# Patient Record
Sex: FEMALE | Race: WHITE | NOT HISPANIC OR LATINO | Employment: FULL TIME | URBAN - METROPOLITAN AREA
[De-identification: names, ages, dates, MRNs, and addresses within clinical notes are randomized per-mention and may not be internally consistent; named-entity substitution may affect disease eponyms.]

---

## 2017-09-11 ENCOUNTER — PRENATAL OFFICE VISIT (OUTPATIENT)
Dept: NURSING | Facility: CLINIC | Age: 22
End: 2017-09-11
Payer: MEDICAID

## 2017-09-11 VITALS
HEART RATE: 72 BPM | WEIGHT: 229 LBS | HEIGHT: 67 IN | DIASTOLIC BLOOD PRESSURE: 52 MMHG | BODY MASS INDEX: 35.94 KG/M2 | SYSTOLIC BLOOD PRESSURE: 110 MMHG

## 2017-09-11 DIAGNOSIS — Z34.81 ENCOUNTER FOR SUPERVISION OF OTHER NORMAL PREGNANCY IN FIRST TRIMESTER: Primary | ICD-10-CM

## 2017-09-11 PROBLEM — Z34.90 SUPERVISION OF NORMAL PREGNANCY: Status: ACTIVE | Noted: 2017-09-11

## 2017-09-11 LAB
ERYTHROCYTE [DISTWIDTH] IN BLOOD BY AUTOMATED COUNT: 14.6 % (ref 10–15)
HCT VFR BLD AUTO: 38.2 % (ref 35–47)
HGB BLD-MCNC: 12.6 G/DL (ref 11.7–15.7)
MCH RBC QN AUTO: 26.4 PG (ref 26.5–33)
MCHC RBC AUTO-ENTMCNC: 33 G/DL (ref 31.5–36.5)
MCV RBC AUTO: 80 FL (ref 78–100)
PLATELET # BLD AUTO: 307 10E9/L (ref 150–450)
RBC # BLD AUTO: 4.78 10E12/L (ref 3.8–5.2)
WBC # BLD AUTO: 9.9 10E9/L (ref 4–11)

## 2017-09-11 PROCEDURE — 86850 RBC ANTIBODY SCREEN: CPT | Performed by: OBSTETRICS & GYNECOLOGY

## 2017-09-11 PROCEDURE — 87086 URINE CULTURE/COLONY COUNT: CPT | Performed by: OBSTETRICS & GYNECOLOGY

## 2017-09-11 PROCEDURE — 99207 ZZC NO CHARGE NURSE ONLY: CPT

## 2017-09-11 PROCEDURE — 86762 RUBELLA ANTIBODY: CPT | Performed by: OBSTETRICS & GYNECOLOGY

## 2017-09-11 PROCEDURE — 36415 COLL VENOUS BLD VENIPUNCTURE: CPT | Performed by: OBSTETRICS & GYNECOLOGY

## 2017-09-11 PROCEDURE — 87340 HEPATITIS B SURFACE AG IA: CPT | Performed by: OBSTETRICS & GYNECOLOGY

## 2017-09-11 PROCEDURE — 86901 BLOOD TYPING SEROLOGIC RH(D): CPT | Performed by: OBSTETRICS & GYNECOLOGY

## 2017-09-11 PROCEDURE — 86780 TREPONEMA PALLIDUM: CPT | Performed by: OBSTETRICS & GYNECOLOGY

## 2017-09-11 PROCEDURE — 85027 COMPLETE CBC AUTOMATED: CPT | Performed by: OBSTETRICS & GYNECOLOGY

## 2017-09-11 PROCEDURE — 87389 HIV-1 AG W/HIV-1&-2 AB AG IA: CPT | Performed by: OBSTETRICS & GYNECOLOGY

## 2017-09-11 PROCEDURE — 86900 BLOOD TYPING SEROLOGIC ABO: CPT | Performed by: OBSTETRICS & GYNECOLOGY

## 2017-09-11 RX ORDER — PRENATAL VIT/IRON FUM/FOLIC AC 27MG-0.8MG
1 TABLET ORAL DAILY
Qty: 100 TABLET | Refills: 3
Start: 2017-09-11 | End: 2023-11-09

## 2017-09-11 NOTE — PROGRESS NOTES
"Chief Complaint   Patient presents with     Prenatal Care     New Prenatal Nurse Visit   11w2d    Initial /52 (BP Location: Right arm, Cuff Size: Adult Regular)  Pulse 72  Ht 5' 6.5\" (1.689 m)  Wt 229 lb (103.9 kg)  LMP 06/24/2017  BMI 36.41 kg/m2 Estimated body mass index is 36.41 kg/(m^2) as calculated from the following:    Height as of this encounter: 5' 6.5\" (1.689 m).    Weight as of this encounter: 229 lb (103.9 kg).  Medication Reconciliation: complete     Patient is accompanied by ,son and . Prenatal book and folder (containing standard educational hand-outs and brochures) given to patient. Information in folder reviewed. Questions answered.     Discussed and gave written information on options available for 1st and 2nd trimester screening, CVS, amniocentesis, AFP, and QUAD screen plus optimal time-frame for testing. Brochure given on optional screening available to determine Cystic Fibrosis carrier status. Pt instructed to check with insurance regarding coverage of these optional tests. Pt advised to call back if she desires testing or has any questions or concerns.  Declines First Trimester Screen.  First Trimester ultrasound scheduled.    Prenatal labs obtained. New prenatal visit scheduled on 9/15/17 with Dr. Monahan.  Megan Viera RN          "

## 2017-09-11 NOTE — MR AVS SNAPSHOT
After Visit Summary   9/11/2017    Marie Matos    MRN: [id number]           Patient Information     Date Of Birth          1995        Visit Information        Provider Department      9/11/2017 3:00 PM [facility]; AURY OB NURSE [facility]3]        Today's Diagnoses     Encounter for supervision of other normal pregnancy in first trimester    -  1       Follow-ups after your visit        Your next 10 appointments already scheduled     Sep 14, 2017  3:30 PM CDT   [facility] with OXUS[facility]   [facility]    [address] [id number] [phone]            Sep 15, 2017  2:30 PM CDT   New Prenatal with Celine Monahan MD   [facility]3] ([facility]3])    [address]  [address]  [facility] [place] [facility] [place] [id number] [phone]              Future tests that were ordered for you today     Open Future Orders        Priority Expected Expires Ordered    US OB < 14 weeks, single,  for dating ([id number]) Routine  12/10/2017 9/11/2017            Who to contact     If you have questions or need follow up information about today's clinic visit or your schedule please contact [facility] directly at [phone].  Normal or non-critical lab and imaging results will be communicated to you by MyChart, letter or phone within 4 business days after the clinic has received the results. If you do not hear from us within 7 days, please contact the clinic through MyChart or phone. If you have a critical or abnormal lab result, we will notify you by phone as soon as possible.  Submit refill requests through [employer] or call your pharmacy and they will forward the refill request to us. Please allow 3 business days for your refill to be completed.          Additional Information About Your Visit        [employer] Information     [employer] gives you secure access to your  "electronic health record. If you see a primary care provider, you can also send messages to your care team and make appointments. If you have questions, please call your primary care clinic.  If you do not have a primary care provider, please call [phone] and they will assist you.        Care EveryWhere ID     This is your Care EveryWhere ID. This could be used by other organizations to access your [facility] medical records  [id number]        Your Vitals Were     Pulse Height Last Period BMI (Body Mass Index)          72 5' 6.5\" (1.689 m) 06/24/2017 36.41 kg/m2         Blood Pressure from Last 3 Encounters:   09/11/17 110/52   11/16/15 106/64   11/02/15 108/70    Weight from Last 3 Encounters:   09/11/17 229 lb (103.9 kg)   11/16/15 208 lb (94.3 kg)   11/02/15 203 lb 14.4 oz (92.5 kg)              We Performed the Following     ABO/RH Type and Screen     Anti Treponema     CBC with Platelets     Hepatitis B surface antigen     HIV Antigen Antibody Combo     Rubella Antibody IgG Quantitative     Urine Culture Aerobic Bacterial          Today's Medication Changes          These changes are accurate as of: 9/11/17  4:25 PM.  If you have any questions, ask your nurse or doctor.               Start taking these medicines.        Dose/Directions    prenatal multivitamin plus iron 27-0.8 MG Tabs per tablet   Used for:  Encounter for supervision of other normal pregnancy in first trimester        Dose:  1 tablet   Take 1 tablet by mouth daily   Quantity:  100 tablet   Refills:  3            Where to get your medicines      Some of these will need a paper prescription and others can be bought over the counter.  Ask your nurse if you have questions.     You don't need a prescription for these medications     prenatal multivitamin plus iron 27-0.8 MG Tabs per tablet                Primary Care Provider    Physician No Ref-Primary       No address on file        Equal Access to Services     МАРИНА CANO AH: Valeria zaman " damir Ambrose, waromada lukayasad, sanjuanata karenata cason, bryce turnerchichi kat. So [facility] [phone].    ATENCIÓN: Si habla español, tiene a montes disposición servicios gratuitos de asistencia lingüística. Sharon al [phone].    We comply with applicable federal civil rights laws and [place] laws. We do not discriminate on the basis of race, color, national origin, age, disability sex, sexual orientation or gender identity.            Thank you!     Thank you for choosing [facility]  for your care. Our goal is always to provide you with excellent care. Hearing back from our patients is one way we can continue to improve our services. Please take a few minutes to complete the written survey that you may receive in the mail after your visit with us. Thank you!             Your Updated Medication List - Protect others around you: Learn how to safely use, store and throw away your medicines at www.disposemymeds.org.          This list is accurate as of: 9/11/17  4:25 PM.  Always use your most recent med list.                   Brand Name Dispense Instructions for use Diagnosis    prenatal multivitamin plus iron 27-0.8 MG Tabs per tablet     100 tablet    Take 1 tablet by mouth daily    Encounter for supervision of other normal pregnancy in first trimester

## 2017-09-12 LAB
ABO + RH BLD: NORMAL
ABO + RH BLD: NORMAL
BACTERIA SPEC CULT: NORMAL
BLD GP AB SCN SERPL QL: NORMAL
BLOOD BANK CMNT PATIENT-IMP: NORMAL
SPECIMEN EXP DATE BLD: NORMAL
SPECIMEN SOURCE: NORMAL

## 2017-09-13 LAB
HBV SURFACE AG SERPL QL IA: NONREACTIVE
HIV 1+2 AB+HIV1 P24 AG SERPL QL IA: NONREACTIVE
RUBV IGG SERPL IA-ACNC: 32 IU/ML
T PALLIDUM IGG+IGM SER QL: NEGATIVE

## 2017-09-15 ENCOUNTER — PRENATAL OFFICE VISIT (OUTPATIENT)
Dept: OBGYN | Facility: CLINIC | Age: 22
End: 2017-09-15
Payer: MEDICAID

## 2017-09-15 VITALS
HEART RATE: 96 BPM | WEIGHT: 229.9 LBS | DIASTOLIC BLOOD PRESSURE: 60 MMHG | BODY MASS INDEX: 36.55 KG/M2 | SYSTOLIC BLOOD PRESSURE: 114 MMHG

## 2017-09-15 DIAGNOSIS — Z34.81 ENCOUNTER FOR SUPERVISION OF OTHER NORMAL PREGNANCY IN FIRST TRIMESTER: Primary | ICD-10-CM

## 2017-09-15 DIAGNOSIS — Z23 NEED FOR PROPHYLACTIC VACCINATION AND INOCULATION AGAINST INFLUENZA: ICD-10-CM

## 2017-09-15 LAB
SPECIMEN SOURCE: NORMAL
WET PREP SPEC: NORMAL

## 2017-09-15 PROCEDURE — 99207 ZZC FIRST OB VISIT: CPT | Performed by: OBSTETRICS & GYNECOLOGY

## 2017-09-15 PROCEDURE — G0145 SCR C/V CYTO,THINLAYER,RESCR: HCPCS | Performed by: OBSTETRICS & GYNECOLOGY

## 2017-09-15 PROCEDURE — 87210 SMEAR WET MOUNT SALINE/INK: CPT | Performed by: OBSTETRICS & GYNECOLOGY

## 2017-09-15 PROCEDURE — 90686 IIV4 VACC NO PRSV 0.5 ML IM: CPT | Performed by: OBSTETRICS & GYNECOLOGY

## 2017-09-15 PROCEDURE — 87591 N.GONORRHOEAE DNA AMP PROB: CPT | Performed by: OBSTETRICS & GYNECOLOGY

## 2017-09-15 PROCEDURE — 90471 IMMUNIZATION ADMIN: CPT | Performed by: OBSTETRICS & GYNECOLOGY

## 2017-09-15 PROCEDURE — 87491 CHLMYD TRACH DNA AMP PROBE: CPT | Performed by: OBSTETRICS & GYNECOLOGY

## 2017-09-15 NOTE — PROGRESS NOTES
SUBJECTIVE: Marie Vidal DEVAN Matos is an 22 year old female   Obstetric History       T1      L1     SAB0   TAB0   Ectopic0   Multiple0   Live Births1     here for initial OB visit.    This patient is accompanied in the office by her  and her .  Patient reports nausea, vomiting, dizziness, fatigue, and breast tenderness.  She states that she is able to eat a full meal but is not able to keep the majority of her food down.  Patient states she is staying well hydrated.  Patient also reports discharge and vaginal itching.  Denies vaginal bleeding, dysuria.    Past Medical History of Father of Baby: No significant medical history    Employment:  Making soaps/bath bombs for a company    Dating: consistent with dates on first trimester scan     Past Medical History:   Diagnosis Date     Abnormal Pap smear      Miscarriage           Past Surgical History:   Procedure Laterality Date     DILATION AND CURETTAGE  2012    In [place] - Missed AB          Family History   Problem Relation Age of Onset     Family History Negative Mother      Family History Negative Father      DIABETES Paternal Grandfather 50     CANCER Paternal Grandmother          Social History     Social History     Marital status:      Spouse name: N/A     Number of children: 1     Years of education: N/A     Occupational History     assembly work      Social History Main Topics     Smoking status: Never Smoker     Smokeless tobacco: Never Used     Alcohol use No     Drug use: No     Sexual activity: Yes     Partners: Male     Other Topics Concern     Not on file     Social History Narrative       Review of Systems:   CONSTITUTIONAL: NEGATIVE for fever, chills, change in weight, +fatigue  INTEGUMENTARY/SKIN: NEGATIVE for worrisome rashes, moles or lesions  RESP: NEGATIVE for significant cough or SOB  BREAST: NEGATIVE for masses, tenderness or discharge, +breast tenderness   CV: NEGATIVE for chest pain, palpitations  or peripheral edema  GI: NEGATIVE for abdominal pain, heartburn, or change in bowel habits: +nausea, vomiting  : negative for, dysuria, vaginal discharge and bleeding  MUSCULOSKELETAL: NEGATIVE for significant arthralgias or myalgia  NEURO: NEGATIVE for weakness, dizziness or paresthesias, +dizziness   PSYCHIATRIC: NEGATIVE for changes in mood or affect      This document serves as a record of the services and decisions personally performed and made by Celine Monahan MD. It was created on her behalf by Alicia Hayden, a trained medical scribe. The creation of this document is based the provider's statements to the medical scribe.  Alicia Hayden September 15, 2017 3:04 PM         EXAM: /60 (BP Location: Right arm, Patient Position: Sitting, Cuff Size: Adult Large)  Pulse 96  Wt 104.3 kg (229 lb 14.4 oz)  LMP 06/24/2017  BMI 36.55 kg/m2   FHR obtained with TAUS  GENERAL APPEARANCE: healthy, alert and no distress  NECK: no adenopathy, no asymmetry, masses, or scars and thyroid normal to palpation  RESP: lungs clear to auscultation - no rales, rhonchi or wheezes  BREAST: normal without masses, tenderness or nipple discharge and no palpable axillary masses or adenopathy  CV: regular rates and rhythm, normal S1 S2, no S3 or S4 and no murmur, click or rub -  ABDOMEN:  soft, nontender, no HSM or masses and bowel sounds normal    Pelvix exam:  Perineum: Intact;   Vulva: Normal genitalia;  Vagina: Normal mucosa, no discharge,   Cervix: Parous, closed, mobile, no discharge;  Uterus: 11 weeks,  Retroflexed, normal shape and consistency;   Adnexa: No masses, nodularity, tenderness;  Rectum: Normal without lesion or mass;   Bony Pelvis: Adequate.       ASSESSMENT/ PLAN:  Follow up in 4 weeks.  Normal exercise.  Normal sexual activity.  Prenatal vitamins.  Anticipated weight gain: 11-20 lbs (0.5lbs/wk)  Patient counselled on prenatal testing options to include 1st trimester screening, quad screen, cystic fibrosis  screening, and age related testing as appropriate for aneuploiding including possibly chorionic villus sampling, amniocentesis, level II ultrasound.   Patient will notify us with desired testing or follow-up at next ob visit.   Early gestational diabetes screen.       The information in this document, created by the medical scribe for me, accurately reflects the services I personally performed and the decisions made by me. I have reviewed and approved this document for accuracy prior to leaving the patient care area.  9/15/2017 3:04 PM     Celine Monahan M.D.

## 2017-09-15 NOTE — NURSING NOTE
"Chief Complaint   Patient presents with     Prenatal Care     NPN MD visit     Flu Shot   11w6d  Here today with spouse / child and the   Pt c/o of vaginal discharge/itching on and off since birth of previous child : advised in past that this is normal    Initial /60 (BP Location: Right arm, Patient Position: Sitting, Cuff Size: Adult Large)  Pulse 96  Wt 229 lb 14.4 oz (104.3 kg)  LMP 06/24/2017  BMI 36.55 kg/m2 Estimated body mass index is 36.55 kg/(m^2) as calculated from the following:    Height as of 9/11/17: 5' 6.5\" (1.689 m).    Weight as of this encounter: 229 lb 14.4 oz (104.3 kg).  Medication Reconciliation: complete    "

## 2017-09-15 NOTE — PATIENT INSTRUCTIONS
Call the clinic ([place] [phone], [place] [phone]) right away with any of the following concerns:    1.   Severe abdominal pain or cramping, that does not go away with rest and hydration    2.   Vaginal bleeding.      Continue your prenatal vitamins daily.    Please call with any additional concerns that your have, and continue your prenatal visits every four weeks!

## 2017-09-15 NOTE — PROGRESS NOTES
Injectable Influenza Immunization Documentation    1.  Is the person to be vaccinated sick today? No    2. Does the person to be vaccinated have an allergy to a component   of the vaccine? No    3. Has the person to be vaccinated ever had a serious reaction   to influenza vaccine in the past? No    4. Has the person to be vaccinated ever had Guillain-Barré syndrome? No    Form completed by Radha Ribeiro CMA

## 2017-09-17 LAB
C TRACH DNA SPEC QL NAA+PROBE: NEGATIVE
N GONORRHOEA DNA SPEC QL NAA+PROBE: NEGATIVE
SPECIMEN SOURCE: NORMAL
SPECIMEN SOURCE: NORMAL

## 2017-09-19 LAB
COPATH REPORT: NORMAL
PAP: NORMAL

## 2017-10-13 ENCOUNTER — TELEPHONE (OUTPATIENT)
Dept: OBGYN | Facility: CLINIC | Age: 22
End: 2017-10-13

## 2017-10-13 ENCOUNTER — PRENATAL OFFICE VISIT (OUTPATIENT)
Dept: OBGYN | Facility: CLINIC | Age: 22
End: 2017-10-13
Payer: MEDICAID

## 2017-10-13 VITALS
DIASTOLIC BLOOD PRESSURE: 60 MMHG | SYSTOLIC BLOOD PRESSURE: 116 MMHG | BODY MASS INDEX: 36.25 KG/M2 | HEART RATE: 104 BPM | WEIGHT: 228 LBS

## 2017-10-13 DIAGNOSIS — Z34.82 ENCOUNTER FOR SUPERVISION OF OTHER NORMAL PREGNANCY IN SECOND TRIMESTER: Primary | ICD-10-CM

## 2017-10-13 PROCEDURE — 99207 ZZC PRENATAL VISIT: CPT | Performed by: OBSTETRICS & GYNECOLOGY

## 2017-10-13 PROCEDURE — T1013 SIGN LANG/ORAL INTERPRETER: HCPCS | Mod: U3 | Performed by: OBSTETRICS & GYNECOLOGY

## 2017-10-13 NOTE — NURSING NOTE
"Chief Complaint   Patient presents with     Prenatal Care     nausea and vomiting - alt to prenatal vitamin?     15w6d    Initial /60 (BP Location: Right arm, Patient Position: Chair, Cuff Size: Adult Large)  Pulse 104  Wt 228 lb (103.4 kg)  LMP 06/24/2017  BMI 36.25 kg/m2 Estimated body mass index is 36.25 kg/(m^2) as calculated from the following:    Height as of 9/11/17: 5' 6.5\" (1.689 m).    Weight as of this encounter: 228 lb (103.4 kg).  Medication Reconciliation: complete     Nurse assisted visit.  Elsa Milton MA.      "

## 2017-10-13 NOTE — PATIENT INSTRUCTIONS
Call the clinic ([facility] [phone], [place] [phone]) right away with any of the following concerns:    1.   Severe abdominal pain or cramping, that does not go away with rest and hydration    2.   Vaginal bleeding.      Continue your prenatal vitamins daily.    Please call with any additional concerns that your have, and continue your prenatal visits every four weeks!      For nausea and vomiting in pregnancy:     You may use pyridoxine (vitamin B6), 25 mg by mouth every 6-8 hours as needed with 1/2 of unisom 25 mg tablet taken once or twice daily.     These items may be purchased over the counter.

## 2017-10-13 NOTE — MR AVS SNAPSHOT
After Visit Summary   10/13/2017    Marie Matos    MRN: [id number]           Patient Information     Date Of Birth          1995        Visit Information        Provider Department      10/13/2017 4:00 PM Celine Monahan MD; [facility] [facility]        Today's Diagnoses     Encounter for supervision of other normal pregnancy in second trimester    -  1      Care Instructions    Call the clinic ([place] [phone], [place] [phone]) right away with any of the following concerns:    1.   Severe abdominal pain or cramping, that does not go away with rest and hydration    2.   Vaginal bleeding.      Continue your prenatal vitamins daily.    Please call with any additional concerns that your have, and continue your prenatal visits every four weeks!      For nausea and vomiting in pregnancy:     You may use pyridoxine (vitamin B6), 25 mg by mouth every 6-8 hours as needed with 1/2 of unisom 25 mg tablet taken once or twice daily.     These items may be purchased over the counter.                 Follow-ups after your visit        Your next 10 appointments already scheduled     Nov 17, 2017  4:00 PM CST   ESTABLISHED PRENATAL with Celine Monahan MD   [facility] ([facility]    [address]  [address]  [place] [address] [phone]              Future tests that were ordered for you today     Open Future Orders        Priority Expected Expires Ordered    Maternal quad screen Routine  10/13/2018 10/13/2017    US OB > 14 Weeks Complete Single Routine  10/13/2018 10/13/2017            Who to contact     If you have questions or need follow up information about today's clinic visit or your schedule please contact [facility] directly at [phone].  Normal or non-critical lab and imaging results will be communicated to you by MyChart, letter or phone within 4 business days after the clinic  has received the results. If you do not hear from us within 7 days, please contact the clinic through [employer] or phone. If you have a critical or abnormal lab result, we will notify you by phone as soon as possible.  Submit refill requests through [employer] or call your pharmacy and they will forward the refill request to us. Please allow 3 business days for your refill to be completed.          Additional Information About Your Visit        [employer] Information     [employer] gives you secure access to your electronic health record. If you see a primary care provider, you can also send messages to your care team and make appointments. If you have questions, please call your primary care clinic.  If you do not have a primary care provider, please call [phone] and they will assist you.        Care EveryWhere ID     This is your Care EveryWhere ID. This could be used by other organizations to access your [place] medical records  [id number]        Your Vitals Were     Pulse Last Period BMI (Body Mass Index)             104 06/24/2017 36.25 kg/m2          Blood Pressure from Last 3 Encounters:   10/13/17 116/60   09/15/17 114/60   09/11/17 110/52    Weight from Last 3 Encounters:   10/13/17 228 lb (103.4 kg)   09/15/17 229 lb 14.4 oz (104.3 kg)   09/11/17 229 lb (103.9 kg)               Primary Care Provider    Physician No Ref-Primary       NO REF-PRIMARY PHYSICIAN        Equal Access to Services     МАРИНА CANO AH: Hadii rafi ku hadasho Soomaali, waaxda luqadaha, qaybta kaalmada bryce cason. So [facility] [phone].    ATENCIÓN: Si habla español, tiene a montes disposición servicios gratuitos de asistencia lingüística. Llame al [phone].    We comply with applicable federal civil rights laws and [place] laws. We do not discriminate on the basis of race, color, national origin, age, disability, sex, sexual orientation, or gender identity.            Thank you!     Thank you for  choosing [facility]  for your care. Our goal is always to provide you with excellent care. Hearing back from our patients is one way we can continue to improve our services. Please take a few minutes to complete the written survey that you may receive in the mail after your visit with us. Thank you!             Your Updated Medication List - Protect others around you: Learn how to safely use, store and throw away your medicines at www.disposemymeds.org.          This list is accurate as of: 10/13/17 11:59 PM.  Always use your most recent med list.                   Brand Name Dispense Instructions for use Diagnosis    prenatal multivitamin plus iron 27-0.8 MG Tabs per tablet     100 tablet    Take 1 tablet by mouth daily    Encounter for supervision of other normal pregnancy in first trimester

## 2017-10-13 NOTE — PROGRESS NOTES
CC: Marie Matos is a 22 year old who presents for routine prenatal visit @ 15w6d       HPI:  This patient is accompanied in the office by her  and .  Doing well, denies bleeding, cramping.  Patient states her nausea and vomiting have not improved.     This document serves as a record of the services and decisions personally performed and made by Celine Monahan MD. It was created on her behalf by Alicia Hayden, a trained medical scribe. The creation of this document is based the provider's statements to the medical scribe.  Alicia Hayden 2017 4:26 PM       Exam:   See OB flowsheet    ASSESSMENT/PLAN  Marie Matos is a 22 year old   @ 15w6d     1)  Return to clinic in 4 weeks  2)  Recommended Vitamin B6 and Unisom for nausea.   3)  Blood pressure and weight gain reviewed with patient   4)  Patient desires NIPT testing     The information in this document, created by the medical scribe for me, accurately reflects the services I personally performed and the decisions made by me. I have reviewed and approved this document for accuracy prior to leaving the patient care area.  10/13/2017 4:26 PM       Celine Monahan M.D.

## 2017-10-16 NOTE — TELEPHONE ENCOUNTER
Through [facility]  Services [phone] # [id number]  :  I LMOVM for pt to CB for nurse only genetic testing in the [facility] or [facility] office  Radha Ribeiro CMA

## 2017-11-17 ENCOUNTER — PRENATAL OFFICE VISIT (OUTPATIENT)
Dept: OBGYN | Facility: CLINIC | Age: 22
End: 2017-11-17
Payer: MEDICAID

## 2017-11-17 VITALS
HEIGHT: 67 IN | HEART RATE: 66 BPM | DIASTOLIC BLOOD PRESSURE: 58 MMHG | BODY MASS INDEX: 35.83 KG/M2 | WEIGHT: 228.3 LBS | SYSTOLIC BLOOD PRESSURE: 108 MMHG

## 2017-11-17 DIAGNOSIS — Z34.82 ENCOUNTER FOR SUPERVISION OF OTHER NORMAL PREGNANCY IN SECOND TRIMESTER: Primary | ICD-10-CM

## 2017-11-17 PROCEDURE — T1013 SIGN LANG/ORAL INTERPRETER: HCPCS | Mod: U3 | Performed by: OBSTETRICS & GYNECOLOGY

## 2017-11-17 PROCEDURE — 99207 ZZC PRENATAL VISIT: CPT | Performed by: OBSTETRICS & GYNECOLOGY

## 2017-11-17 NOTE — NURSING NOTE
"Chief Complaint   Patient presents with     Prenatal Care     20+6       Initial /58  Pulse 66  Ht 5' 6.5\" (1.689 m)  Wt 228 lb 4.8 oz (103.6 kg)  LMP 2017  BMI 36.3 kg/m2 Estimated body mass index is 36.3 kg/(m^2) as calculated from the following:    Height as of this encounter: 5' 6.5\" (1.689 m).    Weight as of this encounter: 228 lb 4.8 oz (103.6 kg).  BP completed using cuff size: regular        The following HM Due: NONE      The following patient reported/Care Every where data was sent to:  P ABSTRACT QUALITY INITIATIVES [33044]  NA     patient has appointment for today    Tran VARMA               "

## 2017-11-17 NOTE — PROGRESS NOTES
CC: Here for routine prenatal visit @ 20w6d       HPI:  Doing well, denies bleeding, cramping.      Exam:   See OB flowsheet    ASSESSMENT/PLAN  Marie Vidal DEVAN Matos is a 22 year old   @ 20w6d    1)  Return to clinic 4 wks  2) ultrasound scheduled.

## 2017-11-20 ENCOUNTER — TELEPHONE (OUTPATIENT)
Dept: OBGYN | Facility: CLINIC | Age: 22
End: 2017-11-20

## 2017-11-20 ENCOUNTER — ALLIED HEALTH/NURSE VISIT (OUTPATIENT)
Dept: NURSING | Facility: CLINIC | Age: 22
End: 2017-11-20
Payer: MEDICAID

## 2017-11-20 ENCOUNTER — TRANSFERRED RECORDS (OUTPATIENT)
Dept: HEALTH INFORMATION MANAGEMENT | Facility: CLINIC | Age: 22
End: 2017-11-20

## 2017-11-20 VITALS — HEIGHT: 67 IN | BODY MASS INDEX: 36.52 KG/M2 | WEIGHT: 232.7 LBS

## 2017-11-20 DIAGNOSIS — Z34.02 ENCOUNTER FOR SUPERVISION OF NORMAL FIRST PREGNANCY IN SECOND TRIMESTER: Primary | ICD-10-CM

## 2017-11-20 PROCEDURE — 40000791 ZZHCL STATISTIC VERIFI PRENATAL TRISOMY 21,18,13: Mod: 90 | Performed by: OBSTETRICS & GYNECOLOGY

## 2017-11-20 PROCEDURE — 99207 ZZC NO CHARGE NURSE ONLY: CPT

## 2017-11-20 PROCEDURE — 99000 SPECIMEN HANDLING OFFICE-LAB: CPT | Performed by: OBSTETRICS & GYNECOLOGY

## 2017-11-20 PROCEDURE — T1013 SIGN LANG/ORAL INTERPRETER: HCPCS | Mod: U3

## 2017-11-20 PROCEDURE — 36415 COLL VENOUS BLD VENIPUNCTURE: CPT | Performed by: OBSTETRICS & GYNECOLOGY

## 2017-11-20 NOTE — MR AVS SNAPSHOT
After Visit Summary   11/20/2017    Marie Matos    MRN: [id number]           Patient Information     Date Of Birth          1995        Visit Information        Provider Department      11/20/2017 3:15 PM [employer]; [place] OB NURSE [facility]        Today's Diagnoses     Encounter for supervision of normal first pregnancy in second trimester    -  1       Follow-ups after your visit        Your next 10 appointments already scheduled     Nov 28, 2017 10:30 AM CST    OB > 14 WEEKS COMPLETE SINGLE with OXUS1   [facility] ([facility])    [address] [id number] [phone]           Please bring a list of your medicines (including vitamins, minerals and over-the-counter drugs). Also, tell your doctor about any allergies you may have. Wear comfortable clothes and leave your valuables at home.  If you re less than 20 weeks drink four 8-ounce glasses of fluid an hour before your exam. If you need to empty your bladder before your exam, try to release only a little urine. Then, drink another glass of fluid.  You may have up to two family members in the exam room. If you bring a small child, an adult must be there to care for him or her.  Please call the Imaging Department at your exam site with any questions.            Dec 08, 2017  3:30 PM CST   ESTABLISHED PRENATAL with Celine Monahan MD   [facility] ([facility])    [address] [place] [address]  [address]  [facility] [address] [phone]            Jan 05, 2018  1:30 PM CST   ESTABLISHED PRENATAL with Celine Monahan MD   [facility] ([facility]    [address] [place] [address]  [address]  [facility] [address]   [phone]              Who to contact     If you have questions or need follow up information about today's clinic visit or your schedule please contact  "[facility] directly at [phone].  Normal or non-critical lab and imaging results will be communicated to you by MyChart, letter or phone within 4 business days after the clinic has received the results. If you do not hear from us within 7 days, please contact the clinic through Digitwhizhart or phone. If you have a critical or abnormal lab result, we will notify you by phone as soon as possible.  Submit refill requests through Previstar or call your pharmacy and they will forward the refill request to us. Please allow 3 business days for your refill to be completed.          Additional Information About Your Visit        [employer] Information     [employer] gives you secure access to your electronic health record. If you see a primary care provider, you can also send messages to your care team and make appointments. If you have questions, please call your primary care clinic.  If you do not have a primary care provider, please call [phone] and they will assist you.        Care EveryWhere ID     This is your Care EveryWhere ID. This could be used by other organizations to access your [place] medical records  [id number]        Your Vitals Were     Height Last Period BMI (Body Mass Index)             5' 6.5\" (1.689 m) 06/24/2017 37 kg/m2          Blood Pressure from Last 3 Encounters:   11/17/17 108/58   10/13/17 116/60   09/15/17 114/60    Weight from Last 3 Encounters:   11/20/17 232 lb 11.2 oz (105.6 kg)   11/17/17 228 lb 4.8 oz (103.6 kg)   10/13/17 228 lb (103.4 kg)              We Performed the Following     Non Invasive Prenatal Test Cell Free DNA        Primary Care Provider    Physician No Ref-Primary       NO REF-PRIMARY PHYSICIAN        Equal Access to Services     МАРИНА CANO : Hadii rafi Ambrose, josef chávez, bryce oliva. So [facility] [phone].    ATENCIÓN: Si habla español, tiene a montes disposición servicios gratuitos de " asistencia lingüística. Sharon al [phone].    We comply with applicable federal civil rights laws and [place] laws. We do not discriminate on the basis of race, color, national origin, age, disability, sex, sexual orientation, or gender identity.            Thank you!     Thank you for choosing [facility]  for your care. Our goal is always to provide you with excellent care. Hearing back from our patients is one way we can continue to improve our services. Please take a few minutes to complete the written survey that you may receive in the mail after your visit with us. Thank you!             Your Updated Medication List - Protect others around you: Learn how to safely use, store and throw away your medicines at www.disposemymeds.org.          This list is accurate as of: 11/20/17  3:59 PM.  Always use your most recent med list.                   Brand Name Dispense Instructions for use Diagnosis    prenatal multivitamin plus iron 27-0.8 MG Tabs per tablet     100 tablet    Take 1 tablet by mouth daily    Encounter for supervision of other normal pregnancy in first trimester

## 2017-11-20 NOTE — NURSING NOTE
"Chief Complaint   Patient presents with     Allied Health Visit     Progenity Innatal testing       Initial Ht 5' 6.5\" (1.689 m)  Wt 232 lb 11.2 oz (105.6 kg)  LMP 06/24/2017  BMI 37 kg/m2 Estimated body mass index is 37 kg/(m^2) as calculated from the following:    Height as of this encounter: 5' 6.5\" (1.689 m).    Weight as of this encounter: 232 lb 11.2 oz (105.6 kg).  Medication Reconciliation: unable or not appropriate to perform    "

## 2017-11-28 ENCOUNTER — RADIANT APPOINTMENT (OUTPATIENT)
Dept: ULTRASOUND IMAGING | Facility: CLINIC | Age: 22
End: 2017-11-28
Attending: OBSTETRICS & GYNECOLOGY
Payer: MEDICAID

## 2017-11-28 DIAGNOSIS — Q27.0 TWO VESSEL CORD: Primary | ICD-10-CM

## 2017-11-28 DIAGNOSIS — Z34.82 ENCOUNTER FOR SUPERVISION OF OTHER NORMAL PREGNANCY IN SECOND TRIMESTER: ICD-10-CM

## 2017-11-28 PROCEDURE — T1013 SIGN LANG/ORAL INTERPRETER: HCPCS | Mod: U3

## 2017-11-28 PROCEDURE — 76805 OB US >/= 14 WKS SNGL FETUS: CPT | Performed by: OBSTETRICS & GYNECOLOGY

## 2017-11-29 ENCOUNTER — TELEPHONE (OUTPATIENT)
Dept: OBGYN | Facility: CLINIC | Age: 22
End: 2017-11-29

## 2017-11-29 NOTE — TELEPHONE ENCOUNTER
Results received from Progenity testing in [facility]..    Results were: sent to scanning and originals were placed on Dr. Monahan's Stanley Desk.    Message routed to provider to advise.

## 2017-12-12 ENCOUNTER — PRE VISIT (OUTPATIENT)
Dept: MATERNAL FETAL MEDICINE | Facility: CLINIC | Age: 22
End: 2017-12-12

## 2017-12-13 ENCOUNTER — OFFICE VISIT (OUTPATIENT)
Dept: MATERNAL FETAL MEDICINE | Facility: CLINIC | Age: 22
End: 2017-12-13
Attending: OBSTETRICS & GYNECOLOGY
Payer: MEDICAID

## 2017-12-13 ENCOUNTER — HOSPITAL ENCOUNTER (OUTPATIENT)
Dept: ULTRASOUND IMAGING | Facility: CLINIC | Age: 22
Discharge: HOME | End: 2017-12-13
Attending: OBSTETRICS & GYNECOLOGY | Admitting: OBSTETRICS & GYNECOLOGY
Payer: MEDICAID

## 2017-12-13 DIAGNOSIS — O09.899 SINGLE UMBILICAL ARTERY AFFECTING MANAGEMENT OF MOTHER, ANTEPARTUM, SINGLE GESTATION: Primary | ICD-10-CM

## 2017-12-13 DIAGNOSIS — O26.90 PREGNANCY RELATED CONDITION, UNSPECIFIED TRIMESTER: ICD-10-CM

## 2017-12-13 PROCEDURE — 76811 OB US DETAILED SNGL FETUS: CPT

## 2017-12-13 NOTE — MR AVS SNAPSHOT
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, antepartum, single gestation    -  1       Follow-ups after your visit        Your next 10 appointments already scheduled     Dec 29, 2017  2:00 PM CST   Ech Fetal Complete* with [id number]   [facility] Echo[facility]EKG ([facility])    [address]  [facility] [place] [address]               Jan 05, 2018  1:30 PM CST   ESTABLISHED PRENATAL with Celine Monahan MD   [facility] ([facility])    [address]  [address]  [facility] [address]   [phone]            Jan 16, 2018  1:30 PM CST   (Arrive by 1:15 PM)   [facility] US COMPRE SINGLE F/U with [id number]   [facility] Maternal Fetal Medicine Ultrasound - [facility] [place])    [address]  [facility] [address] [phone]           Wear comfortable clothes and leave your valuables at home.            Jan 16, 2018  2:00 PM CST   Radiology MD with  NONA WASHINGTON   [facility] Maternal Fetal Medicine - [facility] [place])    [address]  [facility] [address] [phone]           Please arrive at the time given for your first appointment. This visit is used internally to schedule the physician's time during your ultrasound.              Future tests that were ordered for you today     Open Future Orders        Priority Expected Expires Ordered    Echo Fetal Complete-Peds Cardiology Routine  12/13/2018 12/13/2017    MFM US Comprehensive Single F/U Routine  12/13/2018 12/13/2017            Who to contact     If you have questions or need follow up information about today's  clinic visit or your schedule please contact [facility]AL [facility]IC[facility] directly at [phone].  Normal or non-critical lab and imaging results will be communicated to you by MyChart, letter or phone within 4 business days after the clinic has received the results. If you do not hear from us within 7 days, please contact the clinic through Black Card Mediahart or phone. If you have a critical or abnormal lab result, we will notify you by phone as soon as possible.  Submit refill requests through [employer] or call your pharmacy and they will forward the refill request to us. Please allow 3 business days for your refill to be completed.          Additional Information About Your Visit        [employer] Information     [employer] gives you secure access to your electronic health record. If you see a primary care provider, you can also send messages to your care team and make appointments. If you have questions, please call your primary care clinic.  If you do not have a primary care provider, please call [phone] and they will assist you.        Care EveryWhere ID     This is your Care EveryWhere ID. This could be used by other organizations to access your [place] medical records  [id number]        Your Vitals Were     Last Period                   06/24/2017            Blood Pressure from Last 3 Encounters:   11/17/17 108/58   10/13/17 116/60   09/15/17 114/60    Weight from Last 3 Encounters:   11/20/17 105.6 kg (232 lb 11.2 oz)   11/17/17 103.6 kg (228 lb 4.8 oz)   10/13/17 103.4 kg (228 lb)               Primary Care Provider Fax #    Physician No Ref-Primary [phone]       No address on file        Equal Access to Services     [facility] RACHAEL : Hadii rafi reich Sochepe, waaxda luqadaha, qaybta kaalmada kamla, bryce stephens. So [facility] [phone].    ATENCIÓN: Si habla español, tiene a montes disposición servicios gratuitos de asistencia lingüística. Llame al [phone].    We  comply with applicable federal civil rights laws and [place] laws. We do not discriminate on the basis of race, color, national origin, age, disability, sex, sexual orientation, or gender identity.            Thank you!     Thank you for choosing [facility]  for your care. Our goal is always to provide you with excellent care. Hearing back from our patients is one way we can continue to improve our services. Please take a few minutes to complete the written survey that you may receive in the mail after your visit with us. Thank you!             Your Updated Medication List - Protect others around you: Learn how to safely use, store and throw away your medicines at www.disposemymeds.org.          This list is accurate as of: 12/13/17  2:02 PM.  Always use your most recent med list.                   Brand Name Dispense Instructions for use Diagnosis    prenatal multivitamin plus iron 27-0.8 MG Tabs per tablet     100 tablet    Take 1 tablet by mouth daily    Encounter for supervision of other normal pregnancy in first trimester

## 2017-12-13 NOTE — PROGRESS NOTES
"Please see \"Imaging\" tab under \"Chart Review\" for details of today's visit.    Afshan Murphy    "

## 2017-12-29 ENCOUNTER — HOSPITAL ENCOUNTER (OUTPATIENT)
Dept: CARDIOLOGY | Facility: CLINIC | Age: 22
Discharge: HOME | End: 2017-12-29
Attending: OBSTETRICS & GYNECOLOGY | Admitting: OBSTETRICS & GYNECOLOGY
Payer: MEDICAID

## 2017-12-29 DIAGNOSIS — O09.899 SINGLE UMBILICAL ARTERY AFFECTING MANAGEMENT OF MOTHER, ANTEPARTUM, SINGLE GESTATION: ICD-10-CM

## 2017-12-29 PROCEDURE — T1013 SIGN LANG/ORAL INTERPRETER: HCPCS | Mod: U3

## 2017-12-29 PROCEDURE — 76825 ECHO EXAM OF FETAL HEART: CPT

## 2018-01-05 ENCOUNTER — MYC MEDICAL ADVICE (OUTPATIENT)
Dept: OBGYN | Facility: CLINIC | Age: 23
End: 2018-01-05

## 2018-01-05 PROBLEM — Q27.0 TWO VESSEL CORD: Status: ACTIVE | Noted: 2018-01-05

## 2018-01-16 ENCOUNTER — HOSPITAL ENCOUNTER (OUTPATIENT)
Dept: ULTRASOUND IMAGING | Facility: CLINIC | Age: 23
Discharge: HOME | End: 2018-01-16
Attending: OBSTETRICS & GYNECOLOGY | Admitting: OBSTETRICS & GYNECOLOGY
Payer: MEDICAID

## 2018-01-16 ENCOUNTER — PRENATAL OFFICE VISIT (OUTPATIENT)
Dept: OBGYN | Facility: CLINIC | Age: 23
End: 2018-01-16
Payer: MEDICAID

## 2018-01-16 ENCOUNTER — OFFICE VISIT (OUTPATIENT)
Dept: MATERNAL FETAL MEDICINE | Facility: CLINIC | Age: 23
End: 2018-01-16
Attending: OBSTETRICS & GYNECOLOGY
Payer: MEDICAID

## 2018-01-16 VITALS — SYSTOLIC BLOOD PRESSURE: 94 MMHG | DIASTOLIC BLOOD PRESSURE: 62 MMHG | WEIGHT: 229.2 LBS | BODY MASS INDEX: 36.44 KG/M2

## 2018-01-16 DIAGNOSIS — O09.899 SINGLE UMBILICAL ARTERY AFFECTING MANAGEMENT OF MOTHER, ANTEPARTUM, SINGLE GESTATION: ICD-10-CM

## 2018-01-16 DIAGNOSIS — Q27.0 TWO VESSEL CORD: ICD-10-CM

## 2018-01-16 DIAGNOSIS — O09.899 SINGLE UMBILICAL ARTERY AFFECTING MANAGEMENT OF MOTHER, ANTEPARTUM, SINGLE GESTATION: Primary | ICD-10-CM

## 2018-01-16 DIAGNOSIS — O09.893 SUPERVISION OF OTHER HIGH RISK PREGNANCIES, THIRD TRIMESTER: Primary | ICD-10-CM

## 2018-01-16 PROCEDURE — 99207 ZZC COMPLICATED OB VISIT: CPT | Performed by: OBSTETRICS & GYNECOLOGY

## 2018-01-16 PROCEDURE — T1013 SIGN LANG/ORAL INTERPRETER: HCPCS | Mod: U3 | Performed by: OBSTETRICS & GYNECOLOGY

## 2018-01-16 PROCEDURE — 76816 OB US FOLLOW-UP PER FETUS: CPT

## 2018-01-16 NOTE — PROGRESS NOTES
"Please see \"Imaging\" tab under \"Chart Review\" for details of today's US.    Shaila Barnes, DO    "

## 2018-01-16 NOTE — MR AVS SNAPSHOT
After Visit Summary   1/16/2018    Marie Matos    MRN: [id number]           Patient Information     Date Of Birth          1995        Visit Information        Provider Department      1/16/2018 2:00 PM Shaila Barnes,  [facility] Maternal Fetal Medicine 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, antepartum, single gestation    -  1       Follow-ups after your visit        Your next 10 appointments already scheduled     Jan 16, 2018  3:00 PM CST   ESTABLISHED PRENATAL with Celine Monahan MD   [facility] ([facility])    [address]  [facility] [address]   [phone]            Feb 16, 2018  1:30 PM CST   (Arrive by 1:15 PM)   MFM US COMPRE SINGLE F/U with [id number]   [facility] Maternal Fetal Medicine Ultrasound - [facility] ([facility])    [address]  [facility] [address] [phone]           Wear comfortable clothes and leave your valuables at home.            Feb 16, 2018  2:00 PM CST   Radiology MD with  MFM MD   [facility] Maternal Fetal Medicine [facility] ([facility])    [address]  [facility] [address] [phone]           Please arrive at the time given for your first appointment. This visit is used internally to schedule the physician's time during your ultrasound.              Future tests that were ordered for you today     Open Future Orders        Priority Expected Expires Ordered    MFM US Comprehensive Single F/U Routine  1/16/2019 1/16/2018            Who to contact     If you have questions or need follow up information about today's clinic visit or your schedule please contact [facility]ERNAL FETAL MEDIC[facility] directly at [phone].  Normal or non-critical lab and imaging results will be communicated to you by MyChart, letter or phone within 4 business days after the  clinic has received the results. If you do not hear from us within 7 days, please contact the clinic through mGenerator or phone. If you have a critical or abnormal lab result, we will notify you by phone as soon as possible.  Submit refill requests through mGenerator or call your pharmacy and they will forward the refill request to us. Please allow 3 business days for your refill to be completed.          Additional Information About Your Visit        [employer] Information     mGenerator gives you secure access to your electronic health record. If you see a primary care provider, you can also send messages to your care team and make appointments. If you have questions, please call your primary care clinic.  If you do not have a primary care provider, please call [phone] and they will assist you.        Care EveryWhere ID     This is your Care EveryWhere ID. This could be used by other organizations to access your [place] medical records  [id number]        Your Vitals Were     Last Period                   06/24/2017            Blood Pressure from Last 3 Encounters:   11/17/17 108/58   10/13/17 116/60   09/15/17 114/60    Weight from Last 3 Encounters:   11/20/17 105.6 kg (232 lb 11.2 oz)   11/17/17 103.6 kg (228 lb 4.8 oz)   10/13/17 103.4 kg (228 lb)               Primary Care Provider Office Phone # Fax #    Celine Monahan [phone] [address] [place] [address]        Equal Access to Services     [facility] AH: Hadii rafi pratto Halie, waaxda luqadaha, qaybta kaalmada kamla, bryce stephens. So [facility] [phone].    ATENCIÓN: Si habla español, tiene a montes disposición servicios gratuitos de asistencia lingüística. Llame al [phone].    We comply with applicable federal civil rights laws and [place] laws. We do not discriminate on the basis of race, color, national origin, age, disability, sex, sexual orientation, or gender  identity.            Thank you!     Thank you for choosing [facility]  for your care. Our goal is always to provide you with excellent care. Hearing back from our patients is one way we can continue to improve our services. Please take a few minutes to complete the written survey that you may receive in the mail after your visit with us. Thank you!             Your Updated Medication List - Protect others around you: Learn how to safely use, store and throw away your medicines at www.disposemymeds.org.          This list is accurate as of: 1/16/18  2:16 PM.  Always use your most recent med list.                   Brand Name Dispense Instructions for use Diagnosis    prenatal multivitamin plus iron 27-0.8 MG Tabs per tablet     100 tablet    Take 1 tablet by mouth daily    Encounter for supervision of other normal pregnancy in first trimester

## 2018-01-16 NOTE — PROGRESS NOTES
CC: Here for routine prenatal visit @ 29w3d       HPI:  denies vaginal bleeding, regular contractions, loss of fluid; +fetal movement reported.      Exam:   See OB flowsheet    ASSESSMENT/PLAN  Marie Matos is a 22 year old   @ 29w3d.   2 vessel cord; nl fetal echo 17; growth scan done today at [facility], with EFW 70th percentile, normal NO.  nl NIPT XX    1)  Return to clinic 2 wks  2) GCT, hemoglobin to be done on Friday      This encounter was dictated using voice-recognition software; undetected errors may be present.    Celine Monahan M.D.

## 2018-01-16 NOTE — MR AVS SNAPSHOT
After Visit Summary   1/16/2018    Marie Matos    MRN: [id number]           Patient Information     Date Of Birth          1995        Visit Information        Provider Department      1/16/2018 3:00 PM Celine Monahan MD; [employer] [facility]        Today's Diagnoses     Supervision of other high risk pregnancies, third trimester    -  1    Two vessel cord           Follow-ups after your visit        Your next 10 appointments already scheduled     Feb 16, 2018  1:30 PM CST   (Arrive by 1:15 PM)   MFM US COMPRE SINGLE F/U with [id number]   [facility] Maternal Fetal Medicine Ultrasound - [facility] ([facility])    [address]  [facility] [address] [phone]           Wear comfortable clothes and leave your valuables at home.            Feb 16, 2018  2:00 PM CST   Radiology MD with  MFM MD   [facility] Maternal Fetal Medicine [facility])    [address]  [facility] [address] [phone]           Please arrive at the time given for your first appointment. This visit is used internally to schedule the physician's time during your ultrasound.              Future tests that were ordered for you today     Open Future Orders        Priority Expected Expires Ordered    MFM US Comprehensive Single F/U Routine  1/16/2019 1/16/2018            Who to contact     If you have questions or need follow up information about today's clinic visit or your schedule please contact [facility] directly at [phone].  Normal or non-critical lab and imaging results will be communicated to you by MyChart, letter or phone within 4 business days after the clinic has received the results. If you do not hear from us within 7 days, please contact the clinic through MyChart or phone. If you have a critical or abnormal lab result, we will notify you by phone as soon as  possible.  Submit refill requests through [employer] or call your pharmacy and they will forward the refill request to us. Please allow 3 business days for your refill to be completed.          Additional Information About Your Visit        Phorest[employer] Information     [employer] gives you secure access to your electronic health record. If you see a primary care provider, you can also send messages to your care team and make appointments. If you have questions, please call your primary care clinic.  If you do not have a primary care provider, please call [phone] and they will assist you.        Care EveryWhere ID     This is your Care EveryWhere ID. This could be used by other organizations to access your [place] medical records  [id number]        Your Vitals Were     Last Period BMI (Body Mass Index)                06/24/2017 36.44 kg/m2           Blood Pressure from Last 3 Encounters:   01/16/18 94/62   11/17/17 108/58   10/13/17 116/60    Weight from Last 3 Encounters:   01/16/18 229 lb 3.2 oz (104 kg)   11/20/17 232 lb 11.2 oz (105.6 kg)   11/17/17 228 lb 4.8 oz (103.6 kg)              Today, you had the following     No orders found for display         Today's Medication Changes          These changes are accurate as of: 1/16/18  3:18 PM.  If you have any questions, ask your nurse or doctor.               Start taking these medicines.        Dose/Directions    COMFORT FIT MATERNITY SUPP LG Misc   Used for:  Supervision of other high risk pregnancies, third trimester   Started by:  Celine Monahan MD        Dose:  1 Units   1 Units as needed   Quantity:  1 each   Refills:  0            Where to get your medicines      Some of these will need a paper prescription and others can be bought over the counter.  Ask your nurse if you have questions.     Bring a paper prescription for each of these medications     COMFORT FIT MATERNITY SUPP LG [facility]                Primary Care Provider Office Phone # Fax #     Celine Monahan [phone] [address] [place] [address]        Equal Access to Services     МАРИНА CANO : Hadii aad ku hadsarahrachid Lynnchepe, kassidyda emrahortenciaha, sanjuanata perlarenata saskiachris, bryce singleton laguccichichi kat. So [facility] [phone].    ATENCIÓN: Si habla español, tiene a montes disposición servicios gratuitos de asistencia lingüística. Llame al [phone].    We comply with applicable federal civil rights laws and [place] laws. We do not discriminate on the basis of race, color, national origin, age, disability, sex, sexual orientation, or gender identity.            Thank you!     Thank you for choosing [facility]  for your care. Our goal is always to provide you with excellent care. Hearing back from our patients is one way we can continue to improve our services. Please take a few minutes to complete the written survey that you may receive in the mail after your visit with us. Thank you!             Your Updated Medication List - Protect others around you: Learn how to safely use, store and throw away your medicines at www.disposemymeds.org.          This list is accurate as of: 1/16/18  3:18 PM.  Always use your most recent med list.                   Brand Name Dispense Instructions for use Diagnosis    COMFORT FIT MATERNITY SUPP LG Misc     1 each    1 Units as needed    Supervision of other high risk pregnancies, third trimester       prenatal multivitamin plus iron 27-0.8 MG Tabs per tablet     100 tablet    Take 1 tablet by mouth daily    Encounter for supervision of other normal pregnancy in first trimester

## 2018-01-19 ENCOUNTER — ALLIED HEALTH/NURSE VISIT (OUTPATIENT)
Dept: NURSING | Facility: CLINIC | Age: 23
End: 2018-01-19
Payer: MEDICAID

## 2018-01-19 VITALS — WEIGHT: 228 LBS | BODY MASS INDEX: 36.25 KG/M2

## 2018-01-19 DIAGNOSIS — O09.893 SUPERVISION OF OTHER HIGH RISK PREGNANCIES, THIRD TRIMESTER: Primary | ICD-10-CM

## 2018-01-19 LAB
ERYTHROCYTE [DISTWIDTH] IN BLOOD BY AUTOMATED COUNT: 14.6 % (ref 10–15)
HCT VFR BLD AUTO: 33.7 % (ref 35–47)
HGB BLD-MCNC: 11 G/DL (ref 11.7–15.7)
MCH RBC QN AUTO: 26.7 PG (ref 26.5–33)
MCHC RBC AUTO-ENTMCNC: 32.6 G/DL (ref 31.5–36.5)
MCV RBC AUTO: 82 FL (ref 78–100)
PLATELET # BLD AUTO: 277 10E9/L (ref 150–450)
RBC # BLD AUTO: 4.12 10E12/L (ref 3.8–5.2)
WBC # BLD AUTO: 8.7 10E9/L (ref 4–11)

## 2018-01-19 PROCEDURE — 99207 ZZC NO CHARGE NURSE ONLY: CPT

## 2018-01-19 PROCEDURE — 86780 TREPONEMA PALLIDUM: CPT | Performed by: OBSTETRICS & GYNECOLOGY

## 2018-01-19 PROCEDURE — 82950 GLUCOSE TEST: CPT | Performed by: OBSTETRICS & GYNECOLOGY

## 2018-01-19 PROCEDURE — T1013 SIGN LANG/ORAL INTERPRETER: HCPCS | Mod: U3

## 2018-01-19 PROCEDURE — 85027 COMPLETE CBC AUTOMATED: CPT | Performed by: OBSTETRICS & GYNECOLOGY

## 2018-01-19 PROCEDURE — 36415 COLL VENOUS BLD VENIPUNCTURE: CPT | Performed by: OBSTETRICS & GYNECOLOGY

## 2018-01-19 NOTE — NURSING NOTE
"Chief Complaint   Patient presents with     Allied Health Visit     Patient is here for 1 hour glucose testing        Initial LMP 06/24/2017 Estimated body mass index is 36.44 kg/(m^2) as calculated from the following:    Height as of 11/20/17: 5' 6.5\" (1.689 m).    Weight as of 1/16/18: 229 lb 3.2 oz (104 kg).      patient has a nurse only visit today for her 1 hour glucose testing.  29w6d  Gurpreet Blancas CMA                 "

## 2018-01-19 NOTE — MR AVS SNAPSHOT
After Visit Summary   1/19/2018    Marie Matos    MRN: [id number]           Patient Information     Date Of Birth          1995        Visit Information        Provider Department      1/19/2018 1:45 PM [employer]; [place] OB NURSE [facility]        Today's Diagnoses     Supervision of other high risk pregnancies, third trimester    -  1       Follow-ups after your visit        Your next 10 appointments already scheduled     Feb 02, 2018 11:30 AM CST   ESTABLISHED PRENATAL with Celine Monahan MD   [facility] ([facility])    [address] [place] [address]  [address]  [place] [address]   [phone]            Feb 14, 2018  3:45 PM CST   ESTABLISHED PRENATAL with Rosy Loomis MD   [facility] ([facility])    [address]  [facility] [address]   [phone]            Feb 16, 2018  1:30 PM CST   (Arrive by 1:15 PM)   MFM US COMPRE SINGLE F/U with [id number]   [facility] Maternal Fetal Medicine Ultrasound - [facility])    [address]  [facility] [address]   [phone]           Wear comfortable clothes and leave your valuables at home.            Feb 16, 2018  2:00 PM CST   Radiology MD with  NONA WASHINGTON   [facility] Maternal Fetal Medicine - [facility])    [address]  [facility] [address] [phone]           Please arrive at the time given for your first appointment. This visit is used internally to schedule the physician's time during your ultrasound.            Mar 02, 2018  2:45 PM CST   ESTABLISHED PRENATAL with Celine Monahan MD   [facility] ([facility])    [address] [place] [address]  [address]  [place] [address]   [phone]            Mar 09, 2018  2:45 PM CST   ESTABLISHED PRENATAL with Celine Monahan MD   [facility]  ([facility])    [address]  [address]  [place] [address]   [phone]            Mar 16, 2018  3:00 PM CDT   ESTABLISHED PRENATAL with Terry Crespo MD   [facility]    [address]  [facility] [address] [phone]            Mar 23, 2018  3:00 PM CDT   ESTABLISHED PRENATAL with Terry Crepso MD   [facility] ([facility])    [address]  [facility] [address] [phone]            Mar 30, 2018  3:00 PM CDT   ESTABLISHED PRENATAL with Terry Crespo MD   [facility] ([facility])    [address]  [facility] [address] [phone]              Who to contact     If you have questions or need follow up information about today's clinic visit or your schedule please contact [facility] directly at [phone].  Normal or non-critical lab and imaging results will be communicated to you by [employer], letter or phone within 4 business days after the clinic has received the results. If you do not hear from us within 7 days, please contact the clinic through [employer] or phone. If you have a critical or abnormal lab result, we will notify you by phone as soon as possible.  Submit refill requests through Sound[employer]ie or call your pharmacy and they will forward the refill request to us. Please allow 3 business days for your refill to be completed.          Additional Information About Your Visit        FARR TechnologiesharMODLOFT Information     [employer] gives you secure access to your electronic health record. If you see a primary care provider, you can also send messages to your care team and make appointments. If you have questions, please call your primary care clinic.  If you do not have a primary care provider, please call [phone] and they will assist you.        Care EveryWhere ID     This is your Care EveryWhere ID.  This could be used by other organizations to access your [place] medical records  [id number]        Your Vitals Were     Last Period BMI (Body Mass Index)                06/24/2017 36.25 kg/m2           Blood Pressure from Last 3 Encounters:   01/16/18 94/62   11/17/17 108/58   10/13/17 116/60    Weight from Last 3 Encounters:   01/19/18 228 lb (103.4 kg)   01/16/18 229 lb 3.2 oz (104 kg)   11/20/17 232 lb 11.2 oz (105.6 kg)              We Performed the Following     Anti Treponema     CBC with platelets     Glucose tolerance, gest screen, 1 hour        Primary Care Provider Office Phone # Fax #    Celine Monahan [phone] [address] [place] [address]        Equal Access to Services     [facility]: Hadii aad ku hadasho Soomaali, waaxda luqadaha, qaybta kaalmada adenorrisyakeyonna, bryce hathaway . So [facility] [phone].    ATENCIÓN: Si habla español, tiene a montes disposición servicios gratuitos de asistencia lingüística. Sharon al [phone].    We comply with applicable federal civil rights laws and [place] laws. We do not discriminate on the basis of race, color, national origin, age, disability, sex, sexual orientation, or gender identity.            Thank you!     Thank you for choosing [facility]  for your care. Our goal is always to provide you with excellent care. Hearing back from our patients is one way we can continue to improve our services. Please take a few minutes to complete the written survey that you may receive in the mail after your visit with us. Thank you!             Your Updated Medication List - Protect others around you: Learn how to safely use, store and throw away your medicines at www.disposemymeds.org.          This list is accurate as of: 1/19/18  2:24 PM.  Always use your most recent med list.                   Brand Name Dispense Instructions for use Diagnosis    COMFORT FIT MATERNITY SUPP LG Misc      1 each    1 Units as needed    Supervision of other high risk pregnancies, third trimester       prenatal multivitamin plus iron 27-0.8 MG Tabs per tablet     100 tablet    Take 1 tablet by mouth daily    Encounter for supervision of other normal pregnancy in first trimester

## 2018-01-20 LAB
GLUCOSE 1H P 50 G GLC PO SERPL-MCNC: 136 MG/DL (ref 60–129)
T PALLIDUM IGG+IGM SER QL: NEGATIVE

## 2018-01-29 DIAGNOSIS — O09.893 SUPERVISION OF OTHER HIGH RISK PREGNANCIES, THIRD TRIMESTER: ICD-10-CM

## 2018-01-29 PROCEDURE — 82952 GTT-ADDED SAMPLES: CPT | Performed by: OBSTETRICS & GYNECOLOGY

## 2018-01-29 PROCEDURE — T1013 SIGN LANG/ORAL INTERPRETER: HCPCS | Mod: U3 | Performed by: OBSTETRICS & GYNECOLOGY

## 2018-01-29 PROCEDURE — 82951 GLUCOSE TOLERANCE TEST (GTT): CPT | Performed by: OBSTETRICS & GYNECOLOGY

## 2018-01-29 PROCEDURE — 36415 COLL VENOUS BLD VENIPUNCTURE: CPT | Performed by: OBSTETRICS & GYNECOLOGY

## 2018-01-30 LAB
GLUCOSE 1H P 100 G GLC PO SERPL-MCNC: 149 MG/DL (ref 60–179)
GLUCOSE 2H P 100 G GLC PO SERPL-MCNC: 110 MG/DL (ref 60–154)
GLUCOSE 3H P 100 G GLC PO SERPL-MCNC: 117 MG/DL (ref 60–139)
GLUCOSE P FAST SERPL-MCNC: 89 MG/DL (ref 60–94)

## 2018-02-16 ENCOUNTER — OFFICE VISIT (OUTPATIENT)
Dept: MATERNAL FETAL MEDICINE | Facility: CLINIC | Age: 23
End: 2018-02-16
Attending: OBSTETRICS & GYNECOLOGY
Payer: MEDICAID

## 2018-02-16 ENCOUNTER — HOSPITAL ENCOUNTER (OUTPATIENT)
Dept: ULTRASOUND IMAGING | Facility: CLINIC | Age: 23
Discharge: HOME | End: 2018-02-16
Attending: OBSTETRICS & GYNECOLOGY | Admitting: OBSTETRICS & GYNECOLOGY
Payer: MEDICAID

## 2018-02-16 DIAGNOSIS — O09.899 SINGLE UMBILICAL ARTERY AFFECTING MANAGEMENT OF MOTHER, ANTEPARTUM, SINGLE GESTATION: ICD-10-CM

## 2018-02-16 DIAGNOSIS — O09.899 SINGLE UMBILICAL ARTERY AFFECTING MANAGEMENT OF MOTHER, ANTEPARTUM, SINGLE GESTATION: Primary | ICD-10-CM

## 2018-02-16 PROCEDURE — T1013 SIGN LANG/ORAL INTERPRETER: HCPCS | Mod: U3,ZF

## 2018-02-16 PROCEDURE — 76816 OB US FOLLOW-UP PER FETUS: CPT

## 2018-02-16 NOTE — MR AVS SNAPSHOT
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, antepartum, single gestation    -  1       Follow-ups after your visit        Your next 10 appointments already scheduled     Mar 02, 2018  2:45 PM CST   ESTABLISHED PRENATAL with Celine Monahan MD   [facility] ([facility])    [address] [place] [address]  [address]  [place] [address]   [phone]            Mar 09, 2018  2:45 PM CST   ESTABLISHED PRENATAL with Celine Monahan MD   [facility] ([facility])    [address]  [address]  [place] [address]   [phone]            Mar 16, 2018  3:00 PM CDT   ESTABLISHED PRENATAL with Terry Crespo MD   [facility])    [address]  [facility] [address] [phone]            Mar 23, 2018  3:00 PM CDT   ESTABLISHED PRENATAL with Terry Crespo MD   [facility] ([facility])    [address]  [facility] [address] [phone]            Mar 30, 2018  3:00 PM CDT   ESTABLISHED PRENATAL with Terry Crespo MD   [facility] ([facility])    [address]  [facility] [address] [phone]              Who to contact     If you have questions or need follow up information about today's clinic visit or your schedule please contact [facility] directly at [phone].  Normal or non-critical lab and imaging results will be communicated to you by MyChart, letter or phone within 4 business days after the clinic has  received the results. If you do not hear from us within 7 days, please contact the clinic through [employer] or phone. If you have a critical or abnormal lab result, we will notify you by phone as soon as possible.  Submit refill requests through [employer] or call your pharmacy and they will forward the refill request to us. Please allow 3 business days for your refill to be completed.          Additional Information About Your Visit        Material Wrld[employer]Care Information     [employer] gives you secure access to your electronic health record. If you see a primary care provider, you can also send messages to your care team and make appointments. If you have questions, please call your primary care clinic.  If you do not have a primary care provider, please call [phone] and they will assist you.        Care EveryWhere ID     This is your Care EveryWhere ID. This could be used by other organizations to access your [place] medical records  [id number]        Your Vitals Were     Last Period                   06/24/2017            Blood Pressure from Last 3 Encounters:   01/16/18 94/62   11/17/17 108/58   10/13/17 116/60    Weight from Last 3 Encounters:   01/19/18 103.4 kg (228 lb)   01/16/18 104 kg (229 lb 3.2 oz)   11/20/17 105.6 kg (232 lb 11.2 oz)              Today, you had the following     No orders found for display       Primary Care Provider Office Phone # Fax #    Celine Monahan [phone] [address] [place] [address]        Equal Access to Services     [facility]: Hadii aad ku hadasho Soomaali, waaxda luqadaha, qaybta kaalmada saskiaegyakeyonna, bryce hathaway . So [facility] [phone].    ATENCIÓN: Si habla español, tiene a montes disposición servicios gratuitos de asistencia lingüística. Llame al [phone].    We comply with applicable federal civil rights laws and [place] laws. We do not discriminate on the basis of race, color, national origin, age,  disability, sex, sexual orientation, or gender identity.            Thank you!     Thank you for choosing [facility]  for your care. Our goal is always to provide you with excellent care. Hearing back from our patients is one way we can continue to improve our services. Please take a few minutes to complete the written survey that you may receive in the mail after your visit with us. Thank you!             Your Updated Medication List - Protect others around you: Learn how to safely use, store and throw away your medicines at www.disposemymeds.org.          This list is accurate as of 2/16/18  2:38 PM.  Always use your most recent med list.                   Brand Name Dispense Instructions for use Diagnosis    COMFORT FIT MATERNITY SUPP LG Misc     1 each    1 Units as needed    Supervision of other high risk pregnancies, third trimester       prenatal multivitamin plus iron 27-0.8 MG Tabs per tablet     100 tablet    Take 1 tablet by mouth daily    Encounter for supervision of other normal pregnancy in first trimester

## 2018-02-16 NOTE — PROGRESS NOTES
Please see ultrasound report under imaging tab for details on ultrasound performed today.    Comfort Chaves MD  , OB/GYN  Maternal-Fetal Medicine  [email or web address]  [phone] (Academic office)  [phone] (Pager)

## 2018-02-22 ENCOUNTER — NURSE TRIAGE (OUTPATIENT)
Dept: NURSING | Facility: CLINIC | Age: 23
End: 2018-02-22

## 2018-02-22 NOTE — TELEPHONE ENCOUNTER
"  Reason for Disposition    [1] Few streaks of blood in vomit AND [2] occurred one time    Additional Information    Negative: Shock suspected (e.g., cold/pale/clammy skin, too weak to stand, low BP, rapid pulse)    Negative: Difficult to awaken or acting confused  (e.g., disoriented, slurred speech)    Negative: Unable to walk, or can only walk with assistance (e.g., requires support)    Negative: Fainted    Negative: [1] Vomited blood AND [2] large amount (example: \"a cup of blood\")    Negative: Rectal bleeding (i.e., bloody stool, blood in stool)    Negative: Sounds like a life-threatening emergency to the triager    Negative: Weak, dizzy or lightheaded    Negative: [1] Vomited blood AND [2] more than a few streaks of blood    (Exception: few streaks and only occurred once)    (Exception: swallowed blood from a nosebleed or cut in the mouth)    Negative: [1] Vomiting AND [2] contains black (\"coffee ground\") material    Negative: Tarry or jet black-colored stool    Negative: [1] Constant abdominal pain AND [2] present > 2 hours    Negative: Recent abdominal injury (within last 3 days)    Negative: Jaundice (yellow eyes) or known cirrhosis of the liver (or history of liver failure or ascites)    Negative: Taking Coumadin (warfarin) or other strong blood thinner, or known bleeding disorder (e.g., thrombocytopenia)    Negative: Coughing up blood  (i.e., from lungs)    Negative: Pale skin (pallor) of new onset or worsening    Negative: [1] Drinking very little AND [2] dehydration suspected (e.g., no urine > 12 hours, very dry mouth, very lightheaded)    Negative: Age > 60 years    Negative: High-risk adult (e.g., diabetes mellitus, brain tumor, V-P shunt, inguinal hernia, chemotherapy)    Negative: Alcohol abuse    Negative: Patient sounds very sick or weak to the triager    Negative: [1] Vomiting AND [2] abdomen looks much more swollen than usual    Negative: Taking any of the following medications: digoxin " (Lanoxin), lithium, theophylline, phenytoin (Dilantin)    Negative: Vomiting (without blood) persists > 48 hours    Negative: Vomiting a prescription medication    Protocols used: VOMITING BLOOD-ADULT-  Caller is pregnant and KAROL is 03/31/18. Caller states she is vomiting blood. Caller has had two vomiting episodes.Triager called out to answering service, left message for on call Dr. Rosales to call patient at [phone].

## 2018-03-02 ENCOUNTER — PRENATAL OFFICE VISIT (OUTPATIENT)
Dept: OBGYN | Facility: CLINIC | Age: 23
End: 2018-03-02
Payer: MEDICAID

## 2018-03-02 VITALS
WEIGHT: 228.6 LBS | HEART RATE: 88 BPM | DIASTOLIC BLOOD PRESSURE: 58 MMHG | BODY MASS INDEX: 36.34 KG/M2 | SYSTOLIC BLOOD PRESSURE: 106 MMHG

## 2018-03-02 DIAGNOSIS — Q27.0 TWO VESSEL CORD: ICD-10-CM

## 2018-03-02 DIAGNOSIS — O09.893 SUPERVISION OF OTHER HIGH RISK PREGNANCIES, THIRD TRIMESTER: Primary | ICD-10-CM

## 2018-03-02 PROCEDURE — 87653 STREP B DNA AMP PROBE: CPT | Performed by: OBSTETRICS & GYNECOLOGY

## 2018-03-02 PROCEDURE — 87186 SC STD MICRODIL/AGAR DIL: CPT | Performed by: OBSTETRICS & GYNECOLOGY

## 2018-03-02 PROCEDURE — 90715 TDAP VACCINE 7 YRS/> IM: CPT | Performed by: OBSTETRICS & GYNECOLOGY

## 2018-03-02 PROCEDURE — 99207 ZZC COMPLICATED OB VISIT: CPT | Performed by: OBSTETRICS & GYNECOLOGY

## 2018-03-02 PROCEDURE — 90471 IMMUNIZATION ADMIN: CPT | Performed by: OBSTETRICS & GYNECOLOGY

## 2018-03-02 PROCEDURE — T1013 SIGN LANG/ORAL INTERPRETER: HCPCS | Mod: U3 | Performed by: OBSTETRICS & GYNECOLOGY

## 2018-03-02 NOTE — NURSING NOTE
"Chief Complaint   Patient presents with     Prenatal Care   35w6d  Here today with spouse and the    No concerns  initial /58  Pulse 88  Wt 228 lb 9.6 oz (103.7 kg)  LMP 06/24/2017  BMI 36.34 kg/m2 Estimated body mass index is 36.34 kg/(m^2) as calculated from the following:    Height as of 11/20/17: 5' 6.5\" (1.689 m).    Weight as of this encounter: 228 lb 9.6 oz (103.7 kg).  BP completed using cuff size regular.  Radha Ribeiro CMA    "

## 2018-03-02 NOTE — PROGRESS NOTES
CC: Here for routine prenatal visit @ 35w6d.      HPI:  denies vaginal bleeding, regular contractions, loss of fluid; +fetal movement reported.      Exam:   See OB flowsheet    ASSESSMENT/PLAN  Marie Marcy Matos is a 22 year old   @ 35w6d.   2 vessel cord; nl fetal echo 17; growth scan done at [facility] at 33 wks , with EFW 80th percentile, AC>99%tile, normal NO.  nl NIPT XX.    We discussed the findings of the most recent growth scan today, concern for EFW, especially increased AC.   She is proven to 9lb 11 oz.   Discussed consideration of elective induction if appropriate at 39 wks.       1)  Return to clinic 2 wks  2) GBS today      This encounter was dictated using voice-recognition software; undetected errors may be present.    Celine Monahan M.D.

## 2018-03-02 NOTE — MR AVS SNAPSHOT
After Visit Summary   3/2/2018    Marie Matos    MRN: [id number]           Patient Information     Date Of Birth          1995        Visit Information        Provider Department      3/2/2018 2:45 PM Celine Monahan MD; [employer] [facility]        Today's Diagnoses     Supervision of other high risk pregnancies, third trimester    -  1    Two vessel cord           Follow-ups after your visit        Your next 10 appointments already scheduled     Mar 09, 2018  2:45 PM CST   ESTABLISHED PRENATAL with Celine Monahan MD   [facility] ([facility])    [address]  [address]  [facility] [address]   [phone]            Mar 16, 2018  3:00 PM CDT   ESTABLISHED PRENATAL with Terry Cresop MD   [facility] ([facility])    [address]  [facility] [address]   [phone]            Mar 23, 2018  3:00 PM CDT   ESTABLISHED PRENATAL with Terry Crespo MD   [facility] ([facility])    [address] [facility] [address] [phone]            Mar 30, 2018  3:00 PM CDT   ESTABLISHED PRENATAL with Terry Crespo MD   [facility] ([facility])    [address] [facility] [facility] [address] [phone]              Who to contact     If you have questions or need follow up information about today's clinic visit or your schedule please contact [facility] directly at [phone].  Normal or non-critical lab and imaging results will be communicated to you by MyChart, letter or phone within 4 business days after the clinic has received the results. If you do not hear from us within 7 days, please contact the clinic through MyChart or phone. If you have a critical or abnormal lab result, we will notify you by phone as soon as possible.  Submit refill requests  through [employer] or call your pharmacy and they will forward the refill request to us. Please allow 3 business days for your refill to be completed.          Additional Information About Your Visit        U[employer]comhart Information     [employer] gives you secure access to your electronic health record. If you see a primary care provider, you can also send messages to your care team and make appointments. If you have questions, please call your primary care clinic.  If you do not have a primary care provider, please call [phone] and they will assist you.        Care EveryWhere ID     This is your Care EveryWhere ID. This could be used by other organizations to access your [place] medical records  [id number]        Your Vitals Were     Pulse Last Period BMI (Body Mass Index)             88 06/24/2017 36.34 kg/m2          Blood Pressure from Last 3 Encounters:   03/02/18 106/58   01/16/18 94/62   11/17/17 108/58    Weight from Last 3 Encounters:   03/02/18 228 lb 9.6 oz (103.7 kg)   01/19/18 228 lb (103.4 kg)   01/16/18 229 lb 3.2 oz (104 kg)              We Performed the Following     Group B strep PCR     TDAP VACCINE (ADACEL)        Primary Care Provider Office Phone # Fax #    Celine Monahan [phone] [address] [place] [address]        Equal Access to Services     [facility]: Hadii aad ku hadasho Soomaali, waaxda luqadaha, qaybta kaalmada adeegyada, bryce stephens. So [facility] [phone].    ATENCIÓN: Si habla español, tiene a montes disposición servicios gratuitos de asistencia lingüística. Llame al [phone].    We comply with applicable federal civil rights laws and [place] laws. We do not discriminate on the basis of race, color, national origin, age, disability, sex, sexual orientation, or gender identity.            Thank you!     Thank you for choosing [facility]  for your care. Our goal is always to provide you with  excellent care. Hearing back from our patients is one way we can continue to improve our services. Please take a few minutes to complete the written survey that you may receive in the mail after your visit with us. Thank you!             Your Updated Medication List - Protect others around you: Learn how to safely use, store and throw away your medicines at www.disposemymeds.org.          This list is accurate as of 3/2/18  3:36 PM.  Always use your most recent med list.                   Brand Name Dispense Instructions for use Diagnosis    COMFORT FIT MATERNITY SUPP LG Misc     1 each    1 Units as needed    Supervision of other high risk pregnancies, third trimester       prenatal multivitamin plus iron 27-0.8 MG Tabs per tablet     100 tablet    Take 1 tablet by mouth daily    Encounter for supervision of other normal pregnancy in first trimester

## 2018-03-04 LAB
GP B STREP DNA SPEC QL NAA+PROBE: POSITIVE
SPECIMEN SOURCE: ABNORMAL

## 2018-03-06 ENCOUNTER — MYC MEDICAL ADVICE (OUTPATIENT)
Dept: OBGYN | Facility: CLINIC | Age: 23
End: 2018-03-06

## 2018-03-07 LAB
BACTERIA SPEC CULT: ABNORMAL
SPECIMEN SOURCE: ABNORMAL

## 2018-03-09 ENCOUNTER — PRENATAL OFFICE VISIT (OUTPATIENT)
Dept: OBGYN | Facility: CLINIC | Age: 23
End: 2018-03-09
Payer: MEDICAID

## 2018-03-09 VITALS
SYSTOLIC BLOOD PRESSURE: 110 MMHG | BODY MASS INDEX: 36.74 KG/M2 | HEART RATE: 100 BPM | WEIGHT: 231.1 LBS | DIASTOLIC BLOOD PRESSURE: 60 MMHG

## 2018-03-09 DIAGNOSIS — O09.893 SUPERVISION OF OTHER HIGH RISK PREGNANCIES, THIRD TRIMESTER: Primary | ICD-10-CM

## 2018-03-09 DIAGNOSIS — Q27.0 TWO VESSEL CORD: ICD-10-CM

## 2018-03-09 PROCEDURE — 99207 ZZC COMPLICATED OB VISIT: CPT | Performed by: OBSTETRICS & GYNECOLOGY

## 2018-03-09 PROCEDURE — T1013 SIGN LANG/ORAL INTERPRETER: HCPCS | Mod: U3 | Performed by: OBSTETRICS & GYNECOLOGY

## 2018-03-09 NOTE — NURSING NOTE
"Chief Complaint   Patient presents with     Prenatal Care   36w6d  Changes in baby activity   Here with family and the     initial /60  Pulse 100  Wt 231 lb 1.6 oz (104.8 kg)  LMP 06/24/2017  BMI 36.74 kg/m2 Estimated body mass index is 36.74 kg/(m^2) as calculated from the following:    Height as of 11/20/17: 5' 6.5\" (1.689 m).    Weight as of this encounter: 231 lb 1.6 oz (104.8 kg).  BP completed using cuff size large.  Radha Ribeiro CMA    "

## 2018-03-09 NOTE — MR AVS SNAPSHOT
After Visit Summary   3/9/2018    Marie Matos    MRN: [id number]           Patient Information     Date Of Birth          1995        Visit Information        Provider Department      3/9/2018 2:25 PM Celine Monahan MD; [employer] [facility]        Today's Diagnoses     Supervision of other high risk pregnancies, third trimester    -  1    Two vessel cord           Follow-ups after your visit        Your next 10 appointments already scheduled     Mar 16, 2018  3:00 PM CDT   [facility]    [address]  [facility] [address]   [phone]            Mar 23, 2018  3:00 PM CDT   ESTABL[facility]AT[facility]    [address]  [facility] [address]   [phone]            Mar 30, 2018  3:00 PM CDT   ESTABL[facility] PR[facility]ATAL [facility]    [address]  [facility] [address]   [phone]              Who to contact     If you have questions or need follow up information about today's clinic visit or your schedule please contact [facility] directly at [phone].  Normal or non-critical lab and imaging results will be communicated to you by MyChart, letter or phone within 4 business days after the clinic has received the results. If you do not hear from us within 7 days, please contact the clinic through MyChart or phone. If you have a critical or abnormal lab result, we will notify you by phone as soon as possible.  Submit refill requests through MeMeMe or call your pharmacy and they will forward the refill request to us. Please allow 3 business days for your refill to be completed.          Additional Information About Your Visit        MyChart Information      [employer] gives you secure access to your electronic health record. If you see a primary care provider, you can also send messages to your care team and make appointments. If you have questions, please call your primary care clinic.  If you do not have a primary care provider, please call [phone] and they will assist you.        Care EveryWhere ID     This is your Care EveryWhere ID. This could be used by other organizations to access your [place] medical records  [id number]        Your Vitals Were     Pulse Last Period BMI (Body Mass Index)             100 06/24/2017 36.74 kg/m2          Blood Pressure from Last 3 Encounters:   03/09/18 110/60   03/02/18 106/58   01/16/18 94/62    Weight from Last 3 Encounters:   03/09/18 231 lb 1.6 oz (104.8 kg)   03/02/18 228 lb 9.6 oz (103.7 kg)   01/19/18 228 lb (103.4 kg)              Today, you had the following     No orders found for display       Primary Care Provider Office Phone # Fax #    Celine Monahan [phone] [address] [place] [address]        Equal Access to Services     [facility] AH: Hadii aad ku hadasho Sokulwinderali, waaxda luqadaha, qaybta kaalmada adenorrisyada, bryce hathaway ah. So [facility] [phone].    ATENCIÓN: Si habla español, tiene a montes disposición servicios gratuitos de asistencia lingüística. Sharon al [phone].    We comply with applicable federal civil rights laws and [place] laws. We do not discriminate on the basis of race, color, national origin, age, disability, sex, sexual orientation, or gender identity.            Thank you!     Thank you for choosing [facility]  for your care. Our goal is always to provide you with excellent care. Hearing back from our patients is one way we can continue to improve our services. Please take a few minutes to complete the written survey that you may receive in the mail after your visit with us. Thank you!              Your Updated Medication List - Protect others around you: Learn how to safely use, store and throw away your medicines at www.disposemymeds.org.          This list is accurate as of 3/9/18  3:14 PM.  Always use your most recent med list.                   Brand Name Dispense Instructions for use Diagnosis    COMFORT FIT MATERNITY SUPP  Misc     1 each    1 Units as needed    Supervision of other high risk pregnancies, third trimester       prenatal multivitamin plus iron 27-0.8 MG Tabs per tablet     100 tablet    Take 1 tablet by mouth daily    Encounter for supervision of other normal pregnancy in first trimester

## 2018-03-09 NOTE — PROGRESS NOTES
CC: Here for routine prenatal visit @ 36w6d.      HPI:  denies vaginal bleeding, regular contractions, loss of fluid; +fetal movement reported.      Exam:   See OB flowsheet    ASSESSMENT/PLAN  Marie Marcy Matos is a 22 year old   @ 36w6d.   2 vessel cord; nl fetal echo 17; growth scan done at [facility] at 33 wks , with EFW 80th percentile, AC>99%tile, normal NO.  nl NIPT XX.    We discussed the findings of the most recent growth scan today, concern for EFW, especially increased AC.   She is proven to 9lb 11 oz.   Discussed consideration of elective induction if appropriate at 39 wks.     GBS +    1)  Return to clinic 1 wks        This encounter was dictated using voice-recognition software; undetected errors may be present.    Celine Monahan M.D.

## 2018-03-16 ENCOUNTER — PRENATAL OFFICE VISIT (OUTPATIENT)
Dept: OBGYN | Facility: CLINIC | Age: 23
End: 2018-03-16
Payer: MEDICAID

## 2018-03-16 VITALS
SYSTOLIC BLOOD PRESSURE: 106 MMHG | DIASTOLIC BLOOD PRESSURE: 62 MMHG | HEART RATE: 88 BPM | WEIGHT: 230 LBS | BODY MASS INDEX: 36.57 KG/M2

## 2018-03-16 DIAGNOSIS — O36.63X0 FETAL MACROSOMIA DURING PREGNANCY IN THIRD TRIMESTER, SINGLE OR UNSPECIFIED FETUS: ICD-10-CM

## 2018-03-16 DIAGNOSIS — H01.9 INFECTED EYE LID: ICD-10-CM

## 2018-03-16 DIAGNOSIS — O09.893 SUPERVISION OF OTHER HIGH RISK PREGNANCIES, THIRD TRIMESTER: Primary | ICD-10-CM

## 2018-03-16 PROCEDURE — 99207 ZZC COMPLICATED OB VISIT: CPT | Performed by: OBSTETRICS & GYNECOLOGY

## 2018-03-16 PROCEDURE — T1013 SIGN LANG/ORAL INTERPRETER: HCPCS | Mod: U3 | Performed by: OBSTETRICS & GYNECOLOGY

## 2018-03-16 NOTE — PROGRESS NOTES
IUP at 37 6/7 weeks     -history of fetal macrosomia/ultrasound at M; c/w evolving macrosomia     -ultrasound ordered for EFW  Notes infected L eye lid; referral to ophthamology

## 2018-03-16 NOTE — NURSING NOTE
"Chief Complaint   Patient presents with     Prenatal Care     baby active and moving - denies contractions - sore on the inside of left eyelid that started on Monday     37w6d    Initial /62 (BP Location: Left arm, Patient Position: Chair, Cuff Size: Adult Large)  Pulse 88  Wt 230 lb (104.3 kg)  LMP 06/24/2017  BMI 36.57 kg/m2 Estimated body mass index is 36.57 kg/(m^2) as calculated from the following:    Height as of 11/20/17: 5' 6.5\" (1.689 m).    Weight as of this encounter: 230 lb (104.3 kg).  Medication Reconciliation: complete     Nurse assisted visit.  Elsa Milton MA.      "

## 2018-03-16 NOTE — MR AVS SNAPSHOT
After Visit Summary   3/16/2018    Marie Matos    MRN: [id number]           Patient Information     Date Of Birth          1995        Visit Information        Provider Department      3/16/2018 3:00 PM Terry Crespo MD; [employer] [employer] [facility]        Today's Diagnoses     Supervision of other high risk pregnancies, third trimester    -  1    Fetal macrosomia during pregnancy in third trimester, single or unspecified fetus        Infected eye lid           Follow-ups after your visit        Additional Services     OPHTHALMOLOGY ADULT REFERRAL       Your provider has referred you to: [facility] [phone]   [email or web address]    Please be aware that coverage of these services is subject to the terms and limitations of your health insurance plan.  Call member services at your health plan with any benefit or coverage questions.      Please bring the following with you to your appointment:    (1) Any X-Rays, CTs or MRIs which have been performed.  Contact the facility where they were done to arrange for  prior to your scheduled appointment.    (2) List of current medications  (3) This referral request   (4) Any documents/labs given to you for this referral                  Your next 10 appointments already scheduled     Mar 20, 2018  2:45 PM CDT   (Arrive by 2:30 PM)   US OB SINGLE FOLLOW UP REPEAT with RIUS1   [facility] ([facility])    [address]  [address]  [facility] [address] [phone]           Please bring a list of your medicines (including vitamins, minerals and over-the-counter drugs). Also, tell your doctor about any allergies you may have. Wear comfortable clothes and leave your valuables at home.  If you re less than 20 weeks drink four 8-ounce glasses of fluid an hour before your exam. If you need to empty your bladder before  your exam, try to release only a little urine. Then, drink another glass of fluid.  You may have up to two family members in the exam room. If you bring a small child, an adult must be there to care for him or her.  Please call the Imaging Department at your exam site with any questions.            Mar 23, 2018  4:00 PM CDT   ESTABLISHED PRENATAL with Celine Monahan MD   [facility]    [address]  [address]  [place] [address] [phone]            Mar 30, 2018  4:00 PM CDT   ESTABLISHED PRENATAL with Celine [facility]ahan MD   [facility]    [address]  [address]  [place] [address] [phone]              Future tests that were ordered for you today     Open Future Orders        Priority Expected Expires Ordered    US OB Ltd One Or More Fetus FU/Repeat Routine  3/16/2019 3/16/2018            Who to contact     If you have questions or need follow up information about today's clinic visit or your schedule please contact [facility] directly at [phone].  Normal or non-critical lab and imaging results will be communicated to you by MyChart, letter or phone within 4 business days after the clinic has received the results. If you do not hear from us within 7 days, please contact the clinic through [employer] or phone. If you have a critical or abnormal lab result, we will notify you by phone as soon as possible.  Submit refill requests through [employer] or call your pharmacy and they will forward the refill request to us. Please allow 3 business days for your refill to be completed.          Additional Information About Your Visit        [employer] Information     [employer] gives you secure access to your electronic health record. If you see a primary care provider, you can also send messages to your care team and make appointments. If you have questions, please call your primary  [facility] clinic.  If you do not have a primary care provider, please call [phone] and they will assist you.        Care EveryWhere ID     This is your Care EveryWhere ID. This could be used by other organizations to access your [place] medical records  [id number]        Your Vitals Were     Pulse Last Period BMI (Body Mass Index)             88 06/24/2017 36.57 kg/m2          Blood Pressure from Last 3 Encounters:   03/16/18 106/62   03/09/18 110/60   03/02/18 106/58    Weight from Last 3 Encounters:   03/16/18 230 lb (104.3 kg)   03/09/18 231 lb 1.6 oz (104.8 kg)   03/02/18 228 lb 9.6 oz (103.7 kg)              We Performed the Following     OPHTHALMOLOGY ADULT REFERRAL        Primary Care Provider Office Phone # Fax #    Celine Monahan [phone] [address] [place] [address]        Equal Access to Services     [facility]: Hadii aad ku hadasho Soomaali, waaxda luqadaha, qaybta kaalmada adeegyada, waxay noein haydeangelon pancho hathaway . So [facility] [phone].    ATENCIÓN: Si habla español, tiene a montes disposición servicios gratuitos de asistencia lingüística. Llame al [phone].    We comply with applicable federal civil rights laws and [place] laws. We do not discriminate on the basis of race, color, national origin, age, disability, sex, sexual orientation, or gender identity.            Thank you!     Thank you for choosing [facility]  for your care. Our goal is always to provide you with excellent care. Hearing back from our patients is one way we can continue to improve our services. Please take a few minutes to complete the written survey that you may receive in the mail after your visit with us. Thank you!             Your Updated Medication List - Protect others around you: Learn how to safely use, store and throw away your medicines at www.disposemymeds.org.          This list is accurate as of 3/16/18  3:22 PM.  Always use your  most recent med list.                   Brand Name Dispense Instructions for use Diagnosis    COMFORT FIT MATERNITY SUPP LG Misc     1 each    1 Units as needed    Supervision of other high risk pregnancies, third trimester       prenatal multivitamin plus iron 27-0.8 MG Tabs per tablet     100 tablet    Take 1 tablet by mouth daily    Encounter for supervision of other normal pregnancy in first trimester

## 2018-03-20 ENCOUNTER — RADIANT APPOINTMENT (OUTPATIENT)
Dept: ULTRASOUND IMAGING | Facility: CLINIC | Age: 23
End: 2018-03-20
Attending: OBSTETRICS & GYNECOLOGY
Payer: MEDICAID

## 2018-03-20 DIAGNOSIS — O09.893 SUPERVISION OF OTHER HIGH RISK PREGNANCIES, THIRD TRIMESTER: ICD-10-CM

## 2018-03-20 DIAGNOSIS — O36.63X0 FETAL MACROSOMIA DURING PREGNANCY IN THIRD TRIMESTER, SINGLE OR UNSPECIFIED FETUS: ICD-10-CM

## 2018-03-20 PROCEDURE — 76816 OB US FOLLOW-UP PER FETUS: CPT | Performed by: OBSTETRICS & GYNECOLOGY

## 2018-03-20 PROCEDURE — T1013 SIGN LANG/ORAL INTERPRETER: HCPCS | Mod: U3 | Performed by: OBSTETRICS & GYNECOLOGY

## 2018-03-23 ENCOUNTER — TELEPHONE (OUTPATIENT)
Dept: OBGYN | Facility: CLINIC | Age: 23
End: 2018-03-23

## 2018-03-23 ENCOUNTER — PRENATAL OFFICE VISIT (OUTPATIENT)
Dept: OBGYN | Facility: CLINIC | Age: 23
End: 2018-03-23
Payer: MEDICAID

## 2018-03-23 VITALS — HEART RATE: 88 BPM | DIASTOLIC BLOOD PRESSURE: 60 MMHG | SYSTOLIC BLOOD PRESSURE: 110 MMHG

## 2018-03-23 DIAGNOSIS — O09.893 SUPERVISION OF OTHER HIGH RISK PREGNANCIES, THIRD TRIMESTER: Primary | ICD-10-CM

## 2018-03-23 DIAGNOSIS — Q27.0 TWO VESSEL CORD: ICD-10-CM

## 2018-03-23 PROCEDURE — 99207 ZZC COMPLICATED OB VISIT: CPT | Performed by: OBSTETRICS & GYNECOLOGY

## 2018-03-23 PROCEDURE — T1013 SIGN LANG/ORAL INTERPRETER: HCPCS | Mod: U3 | Performed by: OBSTETRICS & GYNECOLOGY

## 2018-03-23 PROCEDURE — 59025 FETAL NON-STRESS TEST: CPT | Performed by: OBSTETRICS & GYNECOLOGY

## 2018-03-23 NOTE — TELEPHONE ENCOUNTER
Induction smart phrase   Procedure: Induction  Date: Monday 3/26/18  Time: 7:30am  Hospital: [facility]  Orders faxed and the patient was advised to call [facility] [phone] labor and delivery department one hour prior to arrival.  Added to [place]  Radha Ribeiro CMA

## 2018-03-23 NOTE — PROGRESS NOTES
CC: Here for routine prenatal visit @ 38w6d.     present.       HPI:  denies vaginal bleeding, regular contractions, loss of fluid; +fetal movement reported.      Exam:   See OB flowsheet    NST today reactive, , moderate variability + accels, no decels; no uterine contractions.       ASSESSMENT/PLAN  Marie Matos is a 23 year old   @ 38w6d.   ultrasound 3/20 with EFW 9.1 lbs, NO 6.3; findings discussed with patient and spouse regarding findings, options for management, to include  section vs labor.    Relative risks discussed.   Patient proven to [id number]; patient states no difficulty with delivery, no history of dystocia.   We reviewed the findings of ultrasound, and discussed limitations of the accuracy of US EFW, that there is still possibility of shoulder dystocia, which may also carry risk of injury to the infant, including fracture of clavicle, humerus, or nerve injury.  Patient verbalizes understanding, desires labor vs primary  section.  Have offered Saturday for induction, patient prefers Monday, on which day it was scheduled.       This encounter was dictated using voice-recognition software; undetected errors may be present.    Celine Monahan M.D.

## 2018-03-27 ENCOUNTER — HOSPITAL ENCOUNTER (INPATIENT)
Facility: CLINIC | Age: 23
LOS: 2 days | Discharge: HOME HEALTH | End: 2018-03-29
Attending: OBSTETRICS & GYNECOLOGY | Admitting: OBSTETRICS & GYNECOLOGY
Payer: MEDICAID

## 2018-03-27 ENCOUNTER — MYC MEDICAL ADVICE (OUTPATIENT)
Dept: OBGYN | Facility: CLINIC | Age: 23
End: 2018-03-27

## 2018-03-27 LAB
ABO + RH BLD: NORMAL
ABO + RH BLD: NORMAL
SPECIMEN EXP DATE BLD: NORMAL

## 2018-03-27 PROCEDURE — 25000125 ZZHC RX 250: Performed by: OBSTETRICS & GYNECOLOGY

## 2018-03-27 PROCEDURE — 86900 BLOOD TYPING SEROLOGIC ABO: CPT | Performed by: OBSTETRICS & GYNECOLOGY

## 2018-03-27 PROCEDURE — 86901 BLOOD TYPING SEROLOGIC RH(D): CPT | Performed by: OBSTETRICS & GYNECOLOGY

## 2018-03-27 PROCEDURE — 12000031 ZZH R&B OB CRITICAL

## 2018-03-27 PROCEDURE — 86780 TREPONEMA PALLIDUM: CPT | Performed by: OBSTETRICS & GYNECOLOGY

## 2018-03-27 PROCEDURE — 25000132 ZZH RX MED GY IP 250 OP 250 PS 637: Performed by: OBSTETRICS & GYNECOLOGY

## 2018-03-27 PROCEDURE — 25000128 H RX IP 250 OP 636: Performed by: OBSTETRICS & GYNECOLOGY

## 2018-03-27 PROCEDURE — 10907ZC DRAINAGE OF AMNIOTIC FLUID, THERAPEUTIC FROM PRODUCTS OF CONCEPTION, VIA NATURAL OR ARTIFICIAL OPENING: ICD-10-PCS | Performed by: OBSTETRICS & GYNECOLOGY

## 2018-03-27 RX ORDER — SODIUM CHLORIDE, SODIUM LACTATE, POTASSIUM CHLORIDE, CALCIUM CHLORIDE 600; 310; 30; 20 MG/100ML; MG/100ML; MG/100ML; MG/100ML
INJECTION, SOLUTION INTRAVENOUS CONTINUOUS
Status: DISCONTINUED | OUTPATIENT
Start: 2018-03-27 | End: 2018-03-29 | Stop reason: HOSPADM

## 2018-03-27 RX ORDER — CARBOPROST TROMETHAMINE 250 UG/ML
250 INJECTION, SOLUTION INTRAMUSCULAR
Status: DISCONTINUED | OUTPATIENT
Start: 2018-03-27 | End: 2018-03-29 | Stop reason: HOSPADM

## 2018-03-27 RX ORDER — ONDANSETRON 2 MG/ML
4 INJECTION INTRAMUSCULAR; INTRAVENOUS EVERY 6 HOURS PRN
Status: DISCONTINUED | OUTPATIENT
Start: 2018-03-27 | End: 2018-03-29 | Stop reason: HOSPADM

## 2018-03-27 RX ORDER — FENTANYL CITRATE 50 UG/ML
50-100 INJECTION, SOLUTION INTRAMUSCULAR; INTRAVENOUS
Status: DISCONTINUED | OUTPATIENT
Start: 2018-03-27 | End: 2018-03-29 | Stop reason: HOSPADM

## 2018-03-27 RX ORDER — OXYTOCIN/0.9 % SODIUM CHLORIDE 30/500 ML
100-340 PLASTIC BAG, INJECTION (ML) INTRAVENOUS CONTINUOUS PRN
Status: DISCONTINUED | OUTPATIENT
Start: 2018-03-27 | End: 2018-03-29 | Stop reason: HOSPADM

## 2018-03-27 RX ORDER — LIDOCAINE 40 MG/G
CREAM TOPICAL
Status: DISCONTINUED | OUTPATIENT
Start: 2018-03-27 | End: 2018-03-29 | Stop reason: HOSPADM

## 2018-03-27 RX ORDER — ACETAMINOPHEN 325 MG/1
650 TABLET ORAL EVERY 4 HOURS PRN
Status: DISCONTINUED | OUTPATIENT
Start: 2018-03-27 | End: 2018-03-29 | Stop reason: HOSPADM

## 2018-03-27 RX ORDER — PENICILLIN G POTASSIUM 5000000 [IU]/1
5 INJECTION, POWDER, FOR SOLUTION INTRAMUSCULAR; INTRAVENOUS ONCE
Status: COMPLETED | OUTPATIENT
Start: 2018-03-27 | End: 2018-03-27

## 2018-03-27 RX ORDER — OXYTOCIN 10 [USP'U]/ML
10 INJECTION, SOLUTION INTRAMUSCULAR; INTRAVENOUS
Status: DISCONTINUED | OUTPATIENT
Start: 2018-03-27 | End: 2018-03-29 | Stop reason: HOSPADM

## 2018-03-27 RX ORDER — NALOXONE HYDROCHLORIDE 0.4 MG/ML
.1-.4 INJECTION, SOLUTION INTRAMUSCULAR; INTRAVENOUS; SUBCUTANEOUS
Status: DISCONTINUED | OUTPATIENT
Start: 2018-03-27 | End: 2018-03-29 | Stop reason: HOSPADM

## 2018-03-27 RX ORDER — OXYCODONE AND ACETAMINOPHEN 5; 325 MG/1; MG/1
1 TABLET ORAL
Status: DISCONTINUED | OUTPATIENT
Start: 2018-03-27 | End: 2018-03-29 | Stop reason: HOSPADM

## 2018-03-27 RX ORDER — CITRIC ACID/SODIUM CITRATE 334-500MG
30 SOLUTION, ORAL ORAL ONCE
Status: COMPLETED | OUTPATIENT
Start: 2018-03-27 | End: 2018-03-27

## 2018-03-27 RX ORDER — OXYTOCIN/0.9 % SODIUM CHLORIDE 30/500 ML
1-24 PLASTIC BAG, INJECTION (ML) INTRAVENOUS CONTINUOUS
Status: DISCONTINUED | OUTPATIENT
Start: 2018-03-27 | End: 2018-03-29 | Stop reason: HOSPADM

## 2018-03-27 RX ORDER — IBUPROFEN 800 MG/1
800 TABLET, FILM COATED ORAL
Status: COMPLETED | OUTPATIENT
Start: 2018-03-27 | End: 2018-03-28

## 2018-03-27 RX ORDER — CALCIUM CARBONATE 500 MG/1
1000 TABLET, CHEWABLE ORAL
Status: DISCONTINUED | OUTPATIENT
Start: 2018-03-27 | End: 2018-03-29 | Stop reason: HOSPADM

## 2018-03-27 RX ORDER — METHYLERGONOVINE MALEATE 0.2 MG/ML
200 INJECTION INTRAVENOUS
Status: DISCONTINUED | OUTPATIENT
Start: 2018-03-27 | End: 2018-03-29 | Stop reason: HOSPADM

## 2018-03-27 RX ADMIN — SODIUM CHLORIDE 2.5 MILLION UNITS: 9 INJECTION, SOLUTION INTRAVENOUS at 18:38

## 2018-03-27 RX ADMIN — CALCIUM CARBONATE (ANTACID) CHEW TAB 500 MG 1000 MG: 500 CHEW TAB at 20:51

## 2018-03-27 RX ADMIN — SODIUM CITRATE AND CITRIC ACID MONOHYDRATE 30 ML: 500; 334 SOLUTION ORAL at 21:17

## 2018-03-27 RX ADMIN — SODIUM CHLORIDE, POTASSIUM CHLORIDE, SODIUM LACTATE AND CALCIUM CHLORIDE: 600; 310; 30; 20 INJECTION, SOLUTION INTRAVENOUS at 14:00

## 2018-03-27 RX ADMIN — OXYTOCIN-SODIUM CHLORIDE 0.9% IV SOLN 30 UNIT/500ML 2 MILLI-UNITS/MIN: 30-0.9/5 SOLUTION at 19:06

## 2018-03-27 RX ADMIN — SODIUM CHLORIDE 2.5 MILLION UNITS: 9 INJECTION, SOLUTION INTRAVENOUS at 23:18

## 2018-03-27 RX ADMIN — PENICILLIN G POTASSIUM 5 MILLION UNITS: 5000000 POWDER, FOR SOLUTION INTRAMUSCULAR; INTRAPLEURAL; INTRATHECAL; INTRAVENOUS at 14:00

## 2018-03-27 NOTE — PROVIDER NOTIFICATION
03/27/18 1637   Provider Notification   Provider Name/Title Dr. Cortes   Method of Notification At Bedside   Request Evaluate in Person   Notification Reason Membrane Status;SVE   Md at bedside to discuss plan of care with pt and family. AROM at 1637 with clear fluid. SVE was 4/75/-2/  Orders received to start pitocin 2 hours after if no change.  Will continue to monitor.

## 2018-03-27 NOTE — H&P
[facility] Labor and Delivery History and Physical    Marie Matos MRN# [id number]   Age: 23 year old YOB: 1995     Date of Admission:  3/27/2018    Primary care provider: Celine Monahan           Chief Complaint:   Marie Matos is a 23 year old  female  Estimated Date of Delivery: Mar 31, 2018  who is 39w3d pregnant and being admitted for induction of labor, indication suspected macrosomia.          Pregnancy history:     OBSTETRIC HISTORY:    Obstetric History       T1      L1     SAB1   TAB0   Ectopic0   Multiple0   Live Births1       # Outcome Date GA Lbr Humphrey/2nd Weight Sex Delivery Anes PTL Lv   3 Current            2 Term 01/21/15 39w6d 07:35 / 00:27 4.394 kg (9 lb 11 oz) M Vag-Spont None  STEPHANIE      Apgar1:  9                Apgar5: 9   1 SAB 03/10/12     AB, MISSED             EDC: Estimated Date of Delivery: 3/31/18    Prenatal Labs:   Lab Results   Component Value Date    ABO O 2018    RH Pos 2018    AS Neg 2017    HEPBANG Nonreactive 2017    CHPCRT Negative 09/15/2017    GCPCRT Negative 09/15/2017    TREPAB Negative 2018    HGB 11.0 (L) 2018       GBS Status:   Lab Results   Component Value Date    GBS Positive (A) 2018       Active Problem List  Patient Active Problem List   Diagnosis     Supervision of normal pregnancy     Two vessel cord     Supervision of other high risk pregnancies, third trimester     Fetal macrosomia during pregnancy in third trimester     Indication for care in labor or delivery       Medication Prior to Admission  Prescriptions Prior to Admission   Medication Sig Dispense Refill Last Dose     Prenatal Vit-Fe Fumarate-FA (PRENATAL MULTIVITAMIN PLUS IRON) 27-0.8 MG TABS per tablet Take 1 tablet by mouth daily 100 tablet 3 3/26/2018 at Unknown time     Elastic Bandages & Supports (COMFORT FIT MATERNITY SUPP LG) MISC 1 Units as needed (Patient not taking: Reported on 3/2/2018) 1  each 0 Not Taking   .        Maternal Past Medical History:     Past Medical History:   Diagnosis Date     Abnormal Pap smear      Miscarriage 2012                       Family History:   I have reviewed this patient's family history            Social History:   I have reviewed this patient's social history         Review of Systems:   CONSTITUTIONAL: NEGATIVE for fever, chills, change in weight  ENT/MOUTH: NEGATIVE for ear, mouth and throat problems  RESP: NEGATIVE for significant cough or SOB  CV: NEGATIVE for chest pain, palpitations or peripheral edema          Physical Exam:   Vitals were reviewed  All vitals stable  Temp: 98.7  F (37.1  C) Temp src: Oral BP: 115/60 Pulse: 73   Resp: 18        Constitutional:   awake, alert, cooperative, no apparent distress, and appears stated age     Eyes:   Lids and lashes normal, pupils equal, round and reactive to light, extra ocular muscles intact, sclera clear, conjunctiva normal     ENT:   Normocephalic, without obvious abnormality, atraumatic, sinuses nontender on palpation, external ears without lesions, oral pharynx with moist mucous membranes, tonsils without erythema or exudates, gums normal and good dentition.     Neck:   Supple, symmetrical, trachea midline, no adenopathy, thyroid symmetric, not enlarged and no tenderness, skin normal     Back:   Symmetric, no curvature, spinous processes are non-tender on palpation, paraspinous muscles are non-tender on palpation, no costal vertebral tenderness     Lungs:   No increased work of breathing, good air exchange, clear to auscultation bilaterally, no crackles or wheezing     Cardiovascular:   Normal apical impulse, regular rate and rhythm, normal S1 and S2, no S3 or S4, and no murmur noted     Abdomen:   No scars, normal bowel sounds, gravid uterus ta infant vtx  EFW 3999 gm, no masses palpated, no hepatosplenomegally     Genitounirinary:   External Genitalia:  General appearance; normal, Hair distribution;  normal     Musculoskeletal:   There is no redness, warmth, or swelling of the joints.  Full range of motion noted.  Motor strength is 5 out of 5 all extremities bilaterally.  Tone is normal.      Cervix:   Membranes: AROM  clear amniotic fluid   Dilation: 4   Effacement: 70%   Station:-2   Consistency: average   Position: Mid  Presentation:Cephalic  Fetal Heart Rate Tracing: reactive and reassuring, Tier 1 (normal)  Tocometer: external monitor and inadequate                       Assessment:   Marie Matos is a 39w3d pregnant female admitted with induction of labor, indication suspected macrosomia and AROM.          Plan:   Admit - see IP orders  Observation  Labor induction with Pitocin and AROM    Anticipate vag del  Pelvis feels adequate for this efw to my exam    Kenny Cortes MD

## 2018-03-27 NOTE — IP AVS SNAPSHOT
[facility]    [address]    [facility] [address]    Phone:  [phone]    Fax:  [phone]                                       After Visit Summary   3/27/2018    Marie Matos    MRN: [id number]           After Visit Summary Signature Page     I have received my discharge instructions, and my questions have been answered. I have discussed any challenges I see with this plan with the nurse or doctor.    ..........................................................................................................................................  Patient/Patient Representative Signature      ..........................................................................................................................................  Patient Representative Print Name and Relationship to Patient    ..................................................               ................................................  Date                                            Time    ..........................................................................................................................................  Reviewed by Signature/Title    ...................................................              ..............................................  Date                                                            Time

## 2018-03-27 NOTE — PROVIDER NOTIFICATION
18 1330   Provider Notification   Provider Name/Title Dr. Cortes   Method of Notification In Department   Request Evaluate - Remote   Notification Reason Patient Arrived;SVE    here at 39w3d for induction of labor for Macrosomia. GBS positive baby has 2 vessel cord.  Efm applied x2 and explained. SVE /-2.  ORders received to let pt have a little lunch start PCN and then MD will break BOW about an hour after the PCN has been in.  Will continue to monitor.

## 2018-03-27 NOTE — IP AVS SNAPSHOT
MRN:[id number]                      After Visit Summary   3/27/2018    Marie Matos    MRN: [id number]           Thank you!     Thank you for choosing [facility] for your care. Our goal is always to provide you with excellent care. Hearing back from our patients is one way we can continue to improve our services. Please take a few minutes to complete the written survey that you may receive in the mail after you visit. If you would like to speak to someone directly about your visit please contact Patient Relations at [phone]. Thank you!          Patient Information     Date Of Birth          1995        Designated Caregiver       Most Recent Value    Caregiver    Will someone help with your care after discharge? no      About your hospital stay     You were admitted on:  March 27, 2018 You last received care in the:  [facility]    You were discharged on:  March 29, 2018       Who to Call     For medical emergencies, please call 911.  For non-urgent questions about your medical care, please call your primary care provider or clinic, [phone]          Attending Provider     Provider Specialty    Kenny Cortes MD OB/Gyn       Primary Care Provider Office Phone # Fax #    Celine Monahan [phone]      Your next 10 appointments already scheduled     Mar 30, 2018  4:00 PM CDT   ESTABLISHED [facility] with Celine Monahan MD   [facility]    [address] [place] [id number] [phone]              Further instructions from your care team       Follow up with your provider in 6 weeks for post partum examination.  Lactation Consultant [phone]  Home Care [phone]  [place]  Information [phone]  Vaginal Delivery Discharge Instructions: [place]  Actividad:     Pida a los miembros de montes leah y amigos que la ayuden cuando lo necesite.    No  ponga nada en montes vagina hasta que montes médico lo permita.    Tómese las próximas semanas con calma para que montes cuerpo tenga tiempo de recuperarse. En adriana momento puede hacer cualquier actividad que sienta que puede.    No conduzca si está tomando píldoras para el dolor recetadas por montes médico. Puede conducir si está tomando píldoras de venta julia para el dolor.    Llame a montes proveedor de atención médica si tiene alguno de estos síntomas:    Empapa edis toalla femenina con dylon en el correr de 1 hora o ve coágulos más grandes que edis pelota de golf.    Sangrado que dura más de 6 semanas.    Tiene edis secreción vaginal que huele mal.     Fiebre de 100.4  F (38  C) o más (temperatura tomada bajo montes lengua) con o sin escalofríos     Dolor, calambres o sensibilidad graves en la región inferior de montes vientre.    Aumento del dolor, hinchazón, enrojecimiento o líquido alrededor de janiya puntos.    Necesidad más frecuente o urgente de orinar (hacer pis), o ardor al hacerlo.    Enrojecimiento, hinchazón o dolor alrededor de edis vena en montes pierna.    Problemas para amamantar o un área enrojecida o dolorosa en montes pecho.    Dolor que aumenta o no se va de edis episiotomía o desgarro en el perineo.    Náuseas y vómitos    Dolor en el pecho y tos o dificultad para respirar.    Problemas para manejar la tristeza, ansiedad o depresión.     Si le preocupa hacerse daño o hacerle daño al bebé, llame al médico de inmediato.     Tiene preguntas o inquietudes después de regresar a casa.    Mantenga janiya lesa limpias:  Lávese siempre las lesa antes de tocar el área de montes perineo y los puntos.  [place] ayuda a reducir montes riesgo de infección.  Si janiya lesa no están sucias, puede usar un gel de alcohol para limpiarse las lesa. Mantenga janiya uñas cortas y limpias.      Vaginal Delivery Discharge Instructions  Activity:     Ask family and friends for help when you need it.    Do not place anything in your vagina until your doctor approves.    Take  it easy for the next few weeks to allow your body to recover. You may do any activities you feel up to at that point.    Do not drive while taking pain pills prescribed by your doctor. You may drive if taking over-the-counter pain pills.    Call your health care provider if you have any of these symptoms:    You soak a sanitary pad with blood within 1 hour, or you see blood clots larger than a golf ball.    Bleeding that lasts more than 6 weeks.    You have vaginal discharge that smells bad.     A fever of 100.4  F (38  C) or higher (temperature taken under your tongue), with or without chills     Severe, pain, cramping or tenderness in your lower belly area.    Increased pain, swelling, redness or fluid around your stitches.    A more frequent or urgent need to urinate (pee), or it burns when you pee.    Redness, swelling or pain around a vein in your leg.    Problems breastfeeding, or a red or painful area on your breast.    Pain that increases or does not go away from an episiotomy or perineal tear.    Nausea and vomiting.    Chest pain and cough or are gasping for air.    Problems coping with sadness, anxiety, or depression.     If you have any concerns about hurting yourself or the baby, call your doctor right away.     You have questions or concerns after you return home.    Keep your hands clean:  Always wash your hands before touching your perineal area and stitches.  This helps reduce your risk of infection.  If your hands aren t dirty, you may use an alcohol hand-rub to clean your hands. Keep your nails clean and short.        Pending Results     No orders found from 3/25/2018 to 3/28/2018.            Statement of Approval     Ordered          03/29/18 1235  I have reviewed and agree with all the recommendations and orders detailed in this document.  EFFECTIVE NOW     Approved and electronically signed by:  Allyssa Olson,              Admission Information     Date & Time Provider Department Dept.  "Phone    3/27/2018 Kenny Cortes MD [facility] [phone]      Your Vitals Were     Blood Pressure Pulse Temperature Respirations Height Weight    109/70 82 98.5  F (36.9  C) (Oral) 16 1.753 m (5' 9\") 108 kg (238 lb)    Last Period BMI (Body Mass Index)                2017 35.15 kg/m2          [facility] Information     [facility] gives you secure access to your electronic health record. If you see a primary care provider, you can also send messages to your care team and make appointments. If you have questions, please call your primary care clinic.  If you do not have a primary care provider, please call [phone] and they will assist you.        Care EveryWhere ID     This is your Care EveryWhere ID. This could be used by other organizations to access your [place] medical records  [id number]        Equal Access to Services     МАРИНА CANO : Valeria Ambrose, josef chávez, zay cason, bryce hathaway . So [facility] [phone].    ATENCIÓN: Si habla español, tiene a montes disposición servicios gratuitos de asistencia lingüística. Llame al [phone].    We comply with applicable federal civil rights laws and [place] laws. We do not discriminate on the basis of race, color, national origin, age, disability, sex, sexual orientation, or gender identity.               Review of your medicines      START taking        Dose / Directions    ibuprofen 800 MG tablet   Commonly known as:  ADVIL/MOTRIN   Used for:   (spontaneous vaginal delivery)        Dose:  800 mg   Take 1 tablet (800 mg) by mouth every 6 hours as needed for other (cramping)   Quantity:  90 tablet   Refills:  1         CONTINUE these medicines which have NOT CHANGED        Dose / Directions    COMFORT FIT MATERNITY SUPP LG Misc   Used for:  Supervision of other high risk pregnancies, third trimester        Dose:  1 Units   1 Units as needed   Quantity:  1 each   Refills:  0 "       prenatal multivitamin plus iron 27-0.8 MG Tabs per tablet   Used for:  Encounter for supervision of other normal pregnancy in first trimester        Dose:  1 tablet   Take 1 tablet by mouth daily   Quantity:  100 tablet   Refills:  3            Where to get your medicines      These medications were sent to [facility] [place], [place] - [address] [facility]  [address] [facility] [address]     Phone:  [phone]     ibuprofen 800 MG tablet                Protect others around you: Learn how to safely use, store and throw away your medicines at www.disposemymeds.org.             Medication List: This is a list of all your medications and when to take them. Check marks below indicate your daily home schedule. Keep this list as a reference.      Medications           Morning Afternoon Evening Bedtime As Needed    COMFORT FIT MATERNITY SUPP  Misc   1 Units as needed                                ibuprofen 800 MG tablet   Commonly known as:  ADVIL/MOTRIN   Take 1 tablet (800 mg) by mouth every 6 hours as needed for other (cramping)   Last time this was given:  800 mg on 3/28/2018 11:41 PM                                prenatal multivitamin plus iron 27-0.8 MG Tabs per tablet   Take 1 tablet by mouth daily

## 2018-03-28 LAB — T PALLIDUM IGG+IGM SER QL: NEGATIVE

## 2018-03-28 PROCEDURE — 72200001 ZZH LABOR CARE VAGINAL DELIVERY SINGLE

## 2018-03-28 PROCEDURE — 40000083 ZZH STATISTIC IP LACTATION SERVICES 1-15 MIN

## 2018-03-28 PROCEDURE — 25000132 ZZH RX MED GY IP 250 OP 250 PS 637: Performed by: OBSTETRICS & GYNECOLOGY

## 2018-03-28 PROCEDURE — 59400 OBSTETRICAL CARE: CPT | Performed by: OBSTETRICS & GYNECOLOGY

## 2018-03-28 PROCEDURE — 12000031 ZZH R&B OB CRITICAL

## 2018-03-28 RX ORDER — NALOXONE HYDROCHLORIDE 0.4 MG/ML
.1-.4 INJECTION, SOLUTION INTRAMUSCULAR; INTRAVENOUS; SUBCUTANEOUS
Status: DISCONTINUED | OUTPATIENT
Start: 2018-03-28 | End: 2018-03-29 | Stop reason: HOSPADM

## 2018-03-28 RX ORDER — OXYTOCIN/0.9 % SODIUM CHLORIDE 30/500 ML
100 PLASTIC BAG, INJECTION (ML) INTRAVENOUS CONTINUOUS
Status: DISCONTINUED | OUTPATIENT
Start: 2018-03-28 | End: 2018-03-29 | Stop reason: HOSPADM

## 2018-03-28 RX ORDER — BISACODYL 10 MG
10 SUPPOSITORY, RECTAL RECTAL DAILY PRN
Status: DISCONTINUED | OUTPATIENT
Start: 2018-03-30 | End: 2018-03-29 | Stop reason: HOSPADM

## 2018-03-28 RX ORDER — AMOXICILLIN 250 MG
1 CAPSULE ORAL 2 TIMES DAILY PRN
Status: DISCONTINUED | OUTPATIENT
Start: 2018-03-28 | End: 2018-03-29 | Stop reason: HOSPADM

## 2018-03-28 RX ORDER — ACETAMINOPHEN 325 MG/1
650 TABLET ORAL EVERY 4 HOURS PRN
Status: DISCONTINUED | OUTPATIENT
Start: 2018-03-28 | End: 2018-03-29 | Stop reason: HOSPADM

## 2018-03-28 RX ORDER — LANOLIN 100 %
OINTMENT (GRAM) TOPICAL
Status: DISCONTINUED | OUTPATIENT
Start: 2018-03-28 | End: 2018-03-29 | Stop reason: HOSPADM

## 2018-03-28 RX ORDER — HYDROCORTISONE 2.5 %
CREAM (GRAM) TOPICAL 3 TIMES DAILY PRN
Status: DISCONTINUED | OUTPATIENT
Start: 2018-03-28 | End: 2018-03-29 | Stop reason: HOSPADM

## 2018-03-28 RX ORDER — OXYTOCIN 10 [USP'U]/ML
10 INJECTION, SOLUTION INTRAMUSCULAR; INTRAVENOUS
Status: DISCONTINUED | OUTPATIENT
Start: 2018-03-28 | End: 2018-03-29 | Stop reason: HOSPADM

## 2018-03-28 RX ORDER — MISOPROSTOL 200 UG/1
400 TABLET ORAL
Status: DISCONTINUED | OUTPATIENT
Start: 2018-03-28 | End: 2018-03-29 | Stop reason: HOSPADM

## 2018-03-28 RX ORDER — AMOXICILLIN 250 MG
2 CAPSULE ORAL 2 TIMES DAILY PRN
Status: DISCONTINUED | OUTPATIENT
Start: 2018-03-28 | End: 2018-03-29 | Stop reason: HOSPADM

## 2018-03-28 RX ORDER — IBUPROFEN 800 MG/1
800 TABLET, FILM COATED ORAL EVERY 6 HOURS PRN
Status: DISCONTINUED | OUTPATIENT
Start: 2018-03-28 | End: 2018-03-29 | Stop reason: HOSPADM

## 2018-03-28 RX ORDER — OXYTOCIN/0.9 % SODIUM CHLORIDE 30/500 ML
340 PLASTIC BAG, INJECTION (ML) INTRAVENOUS CONTINUOUS PRN
Status: DISCONTINUED | OUTPATIENT
Start: 2018-03-28 | End: 2018-03-29 | Stop reason: HOSPADM

## 2018-03-28 RX ADMIN — IBUPROFEN 800 MG: 800 TABLET ORAL at 23:41

## 2018-03-28 RX ADMIN — IBUPROFEN 800 MG: 800 TABLET, FILM COATED ORAL at 00:50

## 2018-03-28 RX ADMIN — ACETAMINOPHEN 650 MG: 325 TABLET, FILM COATED ORAL at 04:11

## 2018-03-28 RX ADMIN — IBUPROFEN 800 MG: 800 TABLET ORAL at 08:22

## 2018-03-28 NOTE — PLAN OF CARE
Data: Vital signs within normal limits. Blood pressure within normal limits. Postpartum checks within normal limits - see flow record. Patient eating and drinking normally. Patient able to empty bladder independently and is up ambulating . No apparent signs of infection. Perineum healing well. Patient performing self cares and is able to care for infant. Patient using heat for additional comfort. Patient breast feeding infant.  Action: Patient medicated during the shift for pain. See MAR. Patient reassessed within 1 hour after each medication and pain was improved - patient stated she was comfortable. Some patient education done this shift. See flow record.  Response: Positive attachment behaviors observed with infant. Support person present.   Plan: Anticipate discharge.

## 2018-03-28 NOTE — PROVIDER NOTIFICATION
03/27/18 1886   Provider Notification   Provider Name/Title Dr. Cortes   Method of Notification Phone   Request Evaluate in Person    Dr. Cortes notified of most recent SVE. Patient natural and getting nauseous and shaky. MD will head to L&D department.

## 2018-03-28 NOTE — LACTATION NOTE
Lactation visit. Mom reports baby is latching and NW.  1st baby did not nurse so mom pumped for 7 months.  No other questions or concerns at this time. Encouraged mom to call us PRN.

## 2018-03-28 NOTE — L&D DELIVERY NOTE
OB Vaginal Delivery Note    Marie Matos MRN# [id number]   Age: 23 year old YOB: 1995       GA: 39w4d  GP:   Labor Complications: None   EBL:    mL  QBL:    Delivery Type: Vaginal, Spontaneous Delivery   ROM to Delivery Time: 7h 51m  [place] Weight:      1 Minute 5 Minute 10 Minute   Apgar Totals: 8    9                Delivery Details:  Marie Matos, a 23 year old  female delivered a viable infant with apgars of 8   and 9  . Patient was fully dilated and pushing after    hours    minutes in active labor. Delivery was via vaginal, spontaneous delivery  to a sterile field under none  anesthesia. Infant delivered in vertex     occiput  anterior  position. Anterior and posterior shoulders delivered without difficulty. The cord was clamped, cut twice and 2 vessels  were noted. Cord blood was obtained in routine fashion with the following disposition: lab .      Cord complications: nuchal   Placenta delivered at 3/28 12:31 AM . Placental disposition was Hospital disposal . Fundal massage performed and fundus found to be firm.     Episiotomy: none    Perineum, vagina, cervix were inspected, and the following lacerations were noted:   Perineal lacerations: none                     No lacedrations    Excellent hemostasis was noted. Needle count correct. Infant and patient in delivery room in good and stable condition.         Labor Event Times    Labor onset date:  3/27/18 Onset time:   9:36 PM   Dilation complete date:  3/28/18    Start pushing date/time:  3/28/2018 0041            Labor Events     labor?:  No    steroids:  None   Labor Type:  Induction, AROM, Augmentation      Antibiotics received during labor?:  Yes   Reason for Antibiotics:  GBS   Antibiotics received for GBS:  Penicillin      Rupture identifier:  Rupture 1   Rupture date/time: 3/27/18 1637   Rupture type:  Artificial Rupture of Membranes   Fluid color:  Clear   Fluid odor:  Normal      Induction:   Oxytocin, AROM   Induction date/time:     Cervical ripening date/time:     Indications for induction:  Fetal Abnormality      1:1 continuous labor support provided by?:  RN Labor partogram used?:  no         Delivery/Placenta Date and Time    Delivery Date:  3/28/18 Delivery Time:  12:28 AM   Placenta Date/Time:  3/28/2018 12:31 AM   Oxytocin given at the time of delivery:  after delivery of placenta      Vaginal Counts    Initial count performed by 2 team members:   Two Team Members   Dr. Sebastian Delacruz RNC          Needles Suture [employer]   Initial counts 2  5    Added to count       Final counts 2  5       Placed during labor Accounted for at the end of labor   Yes Yes   NA NA   NA NA      Final count performed by 2 team members:   Two Team Members   Dr. Sebastian ACHARYA RN         Final count correct?:  Yes         Apgars    Living status:  Living    1 Minute 5 Minute 10 Minute 15 Minute 20 Minute   Skin color: 0  1       Heart rate: 2  2       Reflex irritability: 2  2       Muscle tone: 2  2       Respiratory effort: 2  2       Total: 8  9          Apgars assigned by:  REENA ACHARYA RN      Cord    Vessels:  2 Vessels Complications:  Nuchal   Cord Blood Disposition:  Lab Gases Sent?:  No         [place] Resuscitation    Methods:  None         Skin to Skin and Feeding Plan    Skin to skin initiation date/time: 3/28/18    Skin to skin with:  Mother   Skin to skin end date/time:     How do you plan to feed your baby:  Breastfeeding      Labor Events and Shoulder Dystocia    Fetal Tracing Prior to Delivery:  Category 1   Shoulder dystocia present?:  Neg            Delivery (Maternal) (Provider to Complete) (625060)    Episiotomy:  None   Perineal lacerations:  None    Vaginal laceration?:  No    Cervical laceration?:  No          Mother's Information  Mother: Marie Matos ROBERTO #[id number]    Start of Mother's Information     IO Blood Loss  18 2136 - 18 0045    Mom's I/O Activity             End of Mother's Information  Mother: Marie Matos #[id number]            Delivery - Provider to Complete (728142)    Delivering clinician:  KENNY CORTES   Attempted Delivery Types (Choose all that apply):  Spontaneous Vaginal Delivery   Delivery Type (Choose the 1 that will go to the Birth History):  Vaginal, Spontaneous Delivery                           Placenta    Delayed Cord Clamping:  Done   Date/Time:  3/28/2018 12:31 AM   Removal:  Manual Removal   Disposition:  Hospital disposal      Anesthesia    Method:  None         Presentation and Position    Presentation:  Vertex    Occiput Anterior                    Kenny Cortes MD

## 2018-03-28 NOTE — PROGRESS NOTES
Pt 9 cm  100% effaced  vtx +1 station  t's Cat 1  Pelvimetry adequate for this EFW to my exam, proven to almost 4400 gm.  Anticipate[ate vag del  Will have extra staff for del and NNP due to risk of shoulder dystocia  Kenny Cortes M.D.

## 2018-03-28 NOTE — PLAN OF CARE
Problem: Patient Care Overview  Goal: Plan of Care/Patient Progress Review  Outcome: Improving  Pt up and anu. Voiding with no difficulty. Bonding well with baby. Breastfeeding on demand, declined lactation, no assistance needed. IV d/c. Ibuprofen effective for pain management. Senna declined. Continue to monitor.

## 2018-03-28 NOTE — PROVIDER NOTIFICATION
03/27/18 1836   Comments   Comments Report given to Chelita Valiente who is now assuming pt care.

## 2018-03-28 NOTE — PROVIDER NOTIFICATION
03/27/18 2055   Provider Notification   Provider Name/Title Dr. Cortes   Method of Notification Phone   Request Evaluate - Remote   Notification Reason Status Update   Pt is complaining of heartburn, orders received to give pt Bicitra.

## 2018-03-28 NOTE — PLAN OF CARE
Problem: Patient Care Overview  Goal: Plan of Care/Patient Progress Review  Outcome: Improving      Denies any discomfort and declines help with breastfeeding and help with ordering meal. Pt able to answer questions  So no  needed at this time.  Voiding without difficulty.

## 2018-03-29 VITALS
WEIGHT: 238 LBS | SYSTOLIC BLOOD PRESSURE: 125 MMHG | HEART RATE: 78 BPM | TEMPERATURE: 98.1 F | RESPIRATION RATE: 16 BRPM | DIASTOLIC BLOOD PRESSURE: 67 MMHG | HEIGHT: 69 IN | BODY MASS INDEX: 35.25 KG/M2

## 2018-03-29 PROCEDURE — 40000083 ZZH STATISTIC IP LACTATION SERVICES 1-15 MIN

## 2018-03-29 RX ORDER — IBUPROFEN 800 MG/1
800 TABLET, FILM COATED ORAL EVERY 6 HOURS PRN
Qty: 90 TABLET | Refills: 1 | Status: SHIPPED | OUTPATIENT
Start: 2018-03-29 | End: 2018-05-16

## 2018-03-29 NOTE — PLAN OF CARE
Problem: Patient Care Overview  Goal: Plan of Care/Patient Progress Review  Outcome: Improving  UAL. VSS.  at bedside this morning. Plan of care discussed with patient and questions answered. States she is voiding without difficulty and had a BM this morning. Pt requesting discharge later today. Meeting expected outcomes.     Discharge instructions completed. Patient states she understands all discharge instructions and all her questions have been answered. Verbalizes when she needs to return to the clinic for follow up for herself and baby. She is caring for herself and her baby independently.  phone used before discharge to answer any questions and review take home medications.  # [id number].  Discharged to home with baby at 1441.

## 2018-03-29 NOTE — PLAN OF CARE
Problem: Patient Care Overview  Goal: Plan of Care/Patient Progress Review  Patient stable. Up ad anu. Pain managed well with Ibuprofen. Breastfeeding  well, one small blister noted on right nipple. Using lanolin and expressed colostrum. Bonding well with . FOB is present and supportive at bedside.

## 2018-03-29 NOTE — DISCHARGE INSTRUCTIONS
Follow up with your provider in 6 weeks for post partum examination.  Lactation Consultant [phone]  Home Care [phone]  [place]  Information [phone]  Vaginal Delivery Discharge Instructions: [place]  Actividad:     Pida a los miembros de montes leah y amigos que la ayuden cuando lo necesite.    No ponga nada en montes vagina hasta que montes médico lo permita.    Tómese las próximas semanas con calma para que montes cuerpo tenga tiempo de recuperarse. En adriana momento puede hacer cualquier actividad que sienta que puede.    No conduzca si está tomando píldoras para el dolor recetadas por montes médico. Puede conducir si está tomando píldoras de venta julia para el dolor.    Llame a montes proveedor de atención médica si tiene alguno de estos síntomas:    Empapa edis toalla femenina con dylon en el correr de 1 hora o ve coágulos más grandes que edis pelota de golf.    Sangrado que dura más de 6 semanas.    Tiene edis secreción vaginal que huele mal.     Fiebre de 100.4  F (38  C) o más (temperatura tomada bajo montes lengua) con o sin escalofríos     Dolor, calambres o sensibilidad graves en la región inferior de montes vientre.    Aumento del dolor, hinchazón, enrojecimiento o líquido alrededor de janiya puntos.    Necesidad más frecuente o urgente de orinar (hacer pis), o ardor al hacerlo.    Enrojecimiento, hinchazón o dolor alrededor de edis vena en montes pierna.    Problemas para amamantar o un área enrojecida o dolorosa en montes pecho.    Dolor que aumenta o no se va de edis episiotomía o desgarro en el perineo.    Náuseas y vómitos    Dolor en el pecho y tos o dificultad para respirar.    Problemas para manejar la tristeza, ansiedad o depresión.     Si le preocupa hacerse daño o hacerle daño al bebé, llame al médico de inmediato.     Tiene preguntas o inquietudes después de regresar a casa.    Mantenga janiya lesa limpias:  Lávese siempre las lesa antes de tocar el área de montes perineo y los puntos.  [place] ayuda a reducir montes riesgo  de infección.  Si janiya lesa no están sucias, puede usar un gel de alcohol para limpiarse las lesa. Mantenga janiya uñas cortas y limpias.      Vaginal Delivery Discharge Instructions  Activity:     Ask family and friends for help when you need it.    Do not place anything in your vagina until your doctor approves.    Take it easy for the next few weeks to allow your body to recover. You may do any activities you feel up to at that point.    Do not drive while taking pain pills prescribed by your doctor. You may drive if taking over-the-counter pain pills.    Call your health care provider if you have any of these symptoms:    You soak a sanitary pad with blood within 1 hour, or you see blood clots larger than a golf ball.    Bleeding that lasts more than 6 weeks.    You have vaginal discharge that smells bad.     A fever of 100.4  F (38  C) or higher (temperature taken under your tongue), with or without chills     Severe, pain, cramping or tenderness in your lower belly area.    Increased pain, swelling, redness or fluid around your stitches.    A more frequent or urgent need to urinate (pee), or it burns when you pee.    Redness, swelling or pain around a vein in your leg.    Problems breastfeeding, or a red or painful area on your breast.    Pain that increases or does not go away from an episiotomy or perineal tear.    Nausea and vomiting.    Chest pain and cough or are gasping for air.    Problems coping with sadness, anxiety, or depression.     If you have any concerns about hurting yourself or the baby, call your doctor right away.     You have questions or concerns after you return home.    Keep your hands clean:  Always wash your hands before touching your perineal area and stitches.  This helps reduce your risk of infection.  If your hands aren t dirty, you may use an alcohol hand-rub to clean your hands. Keep your nails clean and short.

## 2018-03-29 NOTE — LACTATION NOTE
Lactation follow up.  Milk is in today and this baby continues to NW. Encouraged mom to call us PRN.

## 2018-03-29 NOTE — DISCHARGE SUMMARY
23 year old  y/o  s/p  ppd # 2  hemoglobin is   Hemoglobin   Date Value Ref Range Status   2018 11.0 (L) 11.7 - 15.7 g/dL Final   ]    d/c home   On colace, breast pump and ibuprofen ferrous sulfate oxycodone   Follow-up in 6 weeks for post partum examination    [facility]   Post-Partum Progress Note          Assessment and Plan:    Assessment:   Post-partum day #1  Normal spontaneous vaginal delivery  L&D complications: None      Doing well.  Pain well-controlled.      Plan:   Ambulation encouraged  Discharge later today           Interval History:   Doing well.  Pain is well-controlled.  No fevers.  No history of foul-smelling vaginal discharge.  Good appetite.  Denies chest pain, shortness of breath, nausea or vomiting.  Vaginal bleeding is similar to a heavy menstrual flow.  Ambulatory.  Breastfeeding well.          Significant Problems:    None          Review of Systems:    CONSTITUTIONAL: NEGATIVE for fever, chills, change in weight  INTEGUMENTARY/SKIN: NEGATIVE for worrisome rashes, moles or lesions  EYES: NEGATIVE for vision changes or irritation  ENT/MOUTH: NEGATIVE for ear, mouth and throat problems  RESP: NEGATIVE for significant cough or SOB  BREAST: NEGATIVE for masses, tenderness or discharge  CV: NEGATIVE for chest pain, palpitations or peripheral edema  GI: NEGATIVE for nausea, abdominal pain, heartburn, or change in bowel habits  : NEGATIVE for frequency, dysuria, or hematuria  MUSCULOSKELETAL: NEGATIVE for significant arthralgias or myalgia  NEURO: NEGATIVE for weakness, dizziness or paresthesias  ENDOCRINE: NEGATIVE for temperature intolerance, skin/hair changes  HEME: NEGATIVE for bleeding problems  PSYCHIATRIC: NEGATIVE for changes in mood or affect          Medications:   All medications related to the patient's surgery have been reviewed  -          Physical Exam:     All vitals stable  Patient Vitals for the past 8 hrs:   BP Temp Temp src Pulse Resp   18  0946 109/70 98.5  F (36.9  C) Oral 82 16     Uterine fundus is firm, non-tender and at the level of the umbilicus          Data:     All laboratory data related to this surgery reviewed  Hemoglobin   Date Value Ref Range Status   01/19/2018 11.0 (L) 11.7 - 15.7 g/dL Final   09/11/2017 12.6 11.7 - 15.7 g/dL Final   01/22/2015 8.7 (L) 11.7 - 15.7 g/dL Final   11/04/2014 11.4 (L) 11.7 - 15.7 g/dL Final   10/10/2014 12.1 11.7 - 15.7 g/dL Final     -    Allyssa Olson, DO      Dr. Allyssa Olson, DO    OB/GYN   [facility]

## 2018-03-29 NOTE — PLAN OF CARE
Problem: Patient Care Overview  Goal: Plan of Care/Patient Progress Review  Outcome: Improving  Stable postpartum patient.  Denies pain.  Bonding well with  and breastfeeding well.  Mother and SO present at bedside and assisting pt.

## 2018-04-03 ENCOUNTER — HOSPITAL ENCOUNTER (OUTPATIENT)
Dept: ULTRASOUND IMAGING | Facility: CLINIC | Age: 23
Discharge: HOME | End: 2018-04-03
Attending: OBSTETRICS & GYNECOLOGY | Admitting: OBSTETRICS & GYNECOLOGY
Payer: MEDICAID

## 2018-04-03 PROCEDURE — T1013 SIGN LANG/ORAL INTERPRETER: HCPCS | Mod: U3

## 2018-04-03 PROCEDURE — 76830 TRANSVAGINAL US NON-OB: CPT

## 2018-04-04 ENCOUNTER — OFFICE VISIT (OUTPATIENT)
Dept: OBGYN | Facility: CLINIC | Age: 23
End: 2018-04-04
Payer: MEDICAID

## 2018-04-04 ENCOUNTER — ANCILLARY ORDERS (OUTPATIENT)
Dept: OBGYN | Facility: CLINIC | Age: 23
End: 2018-04-04
Payer: MEDICAID

## 2018-04-04 ENCOUNTER — TELEPHONE (OUTPATIENT)
Dept: OBGYN | Facility: CLINIC | Age: 23
End: 2018-04-04

## 2018-04-04 VITALS
BODY MASS INDEX: 30.66 KG/M2 | DIASTOLIC BLOOD PRESSURE: 68 MMHG | WEIGHT: 207 LBS | HEIGHT: 69 IN | SYSTOLIC BLOOD PRESSURE: 108 MMHG

## 2018-04-04 PROCEDURE — T1013 SIGN LANG/ORAL INTERPRETER: HCPCS | Mod: U3 | Performed by: OBSTETRICS & GYNECOLOGY

## 2018-04-04 PROCEDURE — 99214 OFFICE O/P EST MOD 30 MIN: CPT | Performed by: OBSTETRICS & GYNECOLOGY

## 2018-04-04 NOTE — TELEPHONE ENCOUNTER
Surgeon: Dr Celine Monahan  Assist:  No  Location: [facility]  Date/time preference:  4/5/18 at noon or end of day    Surgery:  Dilation and curettage with ultrasound guidance  Length of Surgery:  30 min  Diagnosis:  Retained placenta  Anesthesia type:  GENERAL    Special instructions / equipment:  Please request ultrasound.  Am admit or same day: SAME DAY  Bowel prep: No  Pre op: SURGEON  Office visit with surgeon prior to surgery: No  Schedule Post Op: 1 week

## 2018-04-04 NOTE — TELEPHONE ENCOUNTER
Type of surgery: dilation and curettage with ultrasound guidance  Location of surgery: [facility]  Date and time of surgery: 4/6/18 @ 12:00 pm  Surgeon: Dr. Monahan  Pre-Op Appt Date: 4/3/18  Post-Op Appt Date: 4/13/18   Packet sent out: Yes  Pre-cert/Authorization completed:  No  Date:

## 2018-04-04 NOTE — NURSING NOTE
"Chief Complaint   Patient presents with     RECHECK     retained products, del 8 days ago       Initial /68 (BP Location: Left arm, Patient Position: Chair, Cuff Size: Adult Regular)  Ht 5' 9\" (1.753 m)  Wt 207 lb (93.9 kg)  LMP 2017  BMI 30.57 kg/m2 Estimated body mass index is 30.57 kg/(m^2) as calculated from the following:    Height as of this encounter: 5' 9\" (1.753 m).    Weight as of this encounter: 207 lb (93.9 kg).  BP completed using cuff size: regular        The following HM Due: NONE      Emely Callejas CMA         "

## 2018-04-04 NOTE — PROGRESS NOTES
[place] [address]  [facility] [address]  [phone]  Dept: [phone]    PRE-OP EVALUATION:  Today's date: 2018    Marie Matos (: 1995) presents for pre-operative evaluation assessment as requested by Dr. Monahan.  She requires evaluation and anesthesia risk assessment prior to undergoing surgery/procedure for treatment of suspected retained placenta .      HPI:     HPI related to upcoming procedure: 23 year old female   Obstetric History       T2      L2     SAB1   TAB0   Ectopic0   Multiple0   Live Births2     status post uncomplicated vaginal delivery 3/28/18.    She called into clinic yesterday with report of passed tissue with picture submitted.   Photo suggestive of possible retained placenta.   Patient denies significant bleeding, cramping, fever, or abnormal vaginal discharge.          See problem list for active medical problems.  Problems all longstanding and stable, except as noted/documented.  See ROS for pertinent symptoms related to these conditions.                                                                                                                                                          .    MEDICAL HISTORY:     Patient Active Problem List    Diagnosis Date Noted     Postpartum state 2018     Priority: Medium     Indication for care in labor or delivery 2018     Priority: Medium     Fetal macrosomia during pregnancy in third trimester 2018     Priority: Medium     Supervision of other high risk pregnancies, third trimester 2018     Priority: Medium     Two vessel cord 2018     Priority: Medium     Supervision of normal pregnancy 2017     Priority: Medium      Past Medical History:   Diagnosis Date     Abnormal Pap smear      Miscarriage      Past Surgical History:   Procedure Laterality Date     DILATION AND CURETTAGE      In [place] - Missed AB     Current Outpatient  "Prescriptions   Medication Sig Dispense Refill     ibuprofen (ADVIL/MOTRIN) 800 MG tablet Take 1 tablet (800 mg) by mouth every 6 hours as needed for other (cramping) 90 tablet 1     Prenatal Vit-Fe Fumarate-FA (PRENATAL MULTIVITAMIN PLUS IRON) 27-0.8 MG TABS per tablet Take 1 tablet by mouth daily 100 tablet 3     OTC products: None, except as noted above    No Known Allergies   Latex Allergy: NO    Social History   Substance Use Topics     Smoking status: Never Smoker     Smokeless tobacco: Never Used     Alcohol use No     History   Drug Use No       REVIEW OF SYSTEMS:   CONSTITUTIONAL: NEGATIVE for fever, chills, change in weight  RESP: NEGATIVE for significant cough or SOB  CV: NEGATIVE for chest pain, palpitations or peripheral edema  GI: NEGATIVE for nausea, abdominal pain, heartburn, or change in bowel habits  : NEGATIVE for frequency, dysuria, or hematuria  NEURO: NEGATIVE for weakness, dizziness or paresthesias  HEME: NEGATIVE for bleeding problems  PSYCHIATRIC: NEGATIVE for changes in mood or affect    EXAM:   /68 (BP Location: Left arm, Patient Position: Chair, Cuff Size: Adult Regular)  Ht 5' 9\" (1.753 m)  Wt 207 lb (93.9 kg)  LMP 06/24/2017  BMI 30.57 kg/m2    GENERAL APPEARANCE: healthy, but mild emotional distress/crying     NECK: no adenopathy, no asymmetry, masses, or scars and thyroid normal to palpation     RESP: lungs clear to auscultation - no rales, rhonchi or wheezes     CV: regular rates and rhythm, normal S1 S2, no S3 or S4 and no murmur, click or rub     ABDOMEN:  soft, nontender, no HSM or masses and bowel sounds normal     MS: extremities normal- no gross deformities noted, no evidence of inflammation in joints, FROM in all extremities.     PSYCH: mentation appears normal. and affect normal/bright    PELVIC:   Vulva: normal external female genitalia,   Urethra meatus: normal, non-tender  Vagina: normal vaginal mucosa, rugated, no lesions, normal physiologic discharge.  "   Cervix: multiparous cervix, no lesions.   Minimal blood in vault; no tissue visible.  Uterus: Normal contour, 12-14 wks size, position; non-tender  Adnexa: No adnexal masses palpated, non-tender  Perineum: normal, no lesions, intact  Anus: normal, no lesions, hemorrhoids    DIAGNOSTICS:   hemoglobin    Recent Labs   Lab Test  01/19/18   1517  09/11/17   1636   10/10/14   0950   HGB  11.0*  12.6   < >  12.1   PLT  277  307   < >  250   NA   --    --    --   136   POTASSIUM   --    --    --   3.6   CR   --    --    --   0.55    < > = values in this interval not displayed.        IMPRESSION:   Reason for surgery/procedure: Possible retained placenta; passage of suspected placental tissue at home yesterday.   Ultrasound yesterday with thickened uterine cavity tissue, approximately 7.9 cm thick.    I discussed the findings yesterday PM, and in clinic today with patient and .    Ultrasound cannot confirm placental tissue vs possible clot.   Ultrasound done today in clinic, with intrauterine tissue (clot vs placenta) measuring smaller at 2.0 x 4.1 cm.    Patient without significant bleeding, abnormal vaginal discharge, fever, or uterine tenderness.    We discussed option for surgical management with suction D&C vs close expectant management.    Patient and  very upset regarding suspected retained placenta, states they had a family member who passed away due to retained tissue.    They would like to proceed with suction dilation and curettage; the procedure, risk were discussed in detail; I recommend using ultrasound guidance in the OR due to postpartum uterus, to minimal risk of uterine perforation.    All questions were answered.    The proposed surgical procedure is considered LOW risk.    REVISED CARDIAC RISK INDEX  The patient has the following serious cardiovascular risks for perioperative complications such as (MI, PE, VFib and 3  AV Block):  No serious cardiac risks  INTERPRETATION: 0 risks: Class I  (very low risk - 0.4% complication rate)    The patient has the following additional risks for perioperative complications:  No identified additional risks    No diagnosis found.    RECOMMENDATIONS:         --Patient is to take all scheduled medications on the day of surgery EXCEPT for modifications listed below.    APPROVAL GIVEN to proceed with proposed procedure, without further diagnostic evaluation       Signed Electronically by: Celine Monahan MD    Copy of this evaluation report is provided to requesting physician.    [facility] Preop Guidelines

## 2018-04-04 NOTE — MR AVS SNAPSHOT
After Visit Summary   4/4/2018    Marie Matos    MRN: [id number]           Patient Information     Date Of Birth          1995        Visit Information        Provider Department      4/4/2018 10:45 AM Celine Monahan MD; MULTILINGUAL WORD [facility]        Today's Diagnoses     Retained portions of placenta    -  1       Follow-ups after your visit        Your next 10 appointments already scheduled     Apr 06, 2018   Procedure with Celine Monahan MD   [facility] PeriOp Services (--)    [address] [facility] [address]   [phone]            Apr 06, 2018 12:00 PM CDT   (Arrive by 11:45 AM)   US INTRAOPERATIVE with RHUS1   [facility] Ultrasound ([facility])    [address] [facility] [address]   [phone]           Please bring a list of your medicines (including vitamins, minerals and over-the-counter drugs). Also, tell your doctor about any allergies you may have. Wear comfortable clothes and leave your valuables at home.  You do not need to do anything special to prepare for your exam.  Please call the Imaging Department at your exam site with any questions.            Apr 13, 2018  9:45 AM CDT   SHORT with Celine Monahan MD   [facility] ([facility])    [facility]  [address] [place] [id number] [phone]              Future tests that were ordered for you today     Open Future Orders        Priority Expected Expires Ordered    US Intraoperative Routine  4/4/2019 4/4/2018    US Pelvic Complete w Transvaginal STAT  4/3/2019 4/3/2018            Who to contact     If you have questions or need follow up information about today's clinic visit or your schedule please [facility] at [phone].  Normal or non-critical lab and imaging results will be communicated to you by MyChart, letter or phone within 4 business days after  "the clinic has received the results. If you do not hear from us within 7 days, please contact the clinic through [employer] or phone. If you have a critical or abnormal lab result, we will notify you by phone as soon as possible.  Submit refill requests through [employer] or call your pharmacy and they will forward the refill request to us. Please allow 3 business days for your refill to be completed.          Additional Information About Your Visit        [employer] Information     [employer] gives you secure access to your electronic health record. If you see a primary care provider, you can also send messages to your care team and make appointments. If you have questions, please call your primary care clinic.  If you do not have a primary care provider, please call [phone] and they will assist you.        Care EveryWhere ID     This is your Care EveryWhere ID. This could be used by other organizations to access your [place] medical records  [id number]        Your Vitals Were     Height Last Period BMI (Body Mass Index)             5' 9\" (1.753 m) 06/24/2017 30.57 kg/m2          Blood Pressure from Last 3 Encounters:   04/04/18 108/68   03/29/18 125/67   03/23/18 110/60    Weight from Last 3 Encounters:   04/04/18 207 lb (93.9 kg)   03/27/18 238 lb (108 kg)   03/16/18 230 lb (104.3 kg)              Today, you had the following     No orders found for display         Today's Medication Changes          These changes are accurate as of 4/4/18  7:32 PM.  If you have any questions, ask your nurse or doctor.               Stop taking these medicines if you haven't already. Please contact your care team if you have questions.     COMFORT FIT MATERNITY SUPP LG Misc   Stopped by:  Celine Monahan MD                    Primary Care Provider Office Phone # Fax #    Celine Monahan [phone] [address] [place] [id number]        Equal Access to Services     МАРИНА CANO AH: Hadii " rafi Ambrose, josef chávez, qamariluta karenata saskiachris, bryce chapmandaisymathew hathaway kat. So [facility] [phone].    ATENCIÓN: Si habla español, tiene a montes disposición servicios gratuitos de asistencia lingüística. Llame al [phone].    We comply with applicable federal civil rights laws and [place] laws. We do not discriminate on the basis of race, color, national origin, age, disability, sex, sexual orientation, or gender identity.            Thank you!     Thank you for choosing [facility]  for your care. Our goal is always to provide you with excellent care. Hearing back from our patients is one way we can continue to improve our services. Please take a few minutes to complete the written survey that you may receive in the mail after your visit with us. Thank you!             Your Updated Medication List - Protect others around you: Learn how to safely use, store and throw away your medicines at www.disposemymeds.org.          This list is accurate as of 18  7:32 PM.  Always use your most recent med list.                   Brand Name Dispense Instructions for use Diagnosis    ibuprofen 800 MG tablet    ADVIL/MOTRIN    90 tablet    Take 1 tablet (800 mg) by mouth every 6 hours as needed for other (cramping)     (spontaneous vaginal delivery)       prenatal multivitamin plus iron 27-0.8 MG Tabs per tablet     100 tablet    Take 1 tablet by mouth daily    Encounter for supervision of other normal pregnancy in first trimester

## 2018-04-05 NOTE — H&P (VIEW-ONLY)
[place] [address]  [facility] [address]  [phone]  Dept: [phone]    PRE-OP EVALUATION:  Today's date: 2018    Marie Matos (: 1995) presents for pre-operative evaluation assessment as requested by Dr. Monahan.  She requires evaluation and anesthesia risk assessment prior to undergoing surgery/procedure for treatment of suspected retained placenta .      HPI:     HPI related to upcoming procedure: 23 year old female   Obstetric History       T2      L2     SAB1   TAB0   Ectopic0   Multiple0   Live Births2     status post uncomplicated vaginal delivery 3/28/18.    She called into clinic yesterday with report of passed tissue with picture submitted.   Photo suggestive of possible retained placenta.   Patient denies significant bleeding, cramping, fever, or abnormal vaginal discharge.          See problem list for active medical problems.  Problems all longstanding and stable, except as noted/documented.  See ROS for pertinent symptoms related to these conditions.                                                                                                                                                          .    MEDICAL HISTORY:     Patient Active Problem List    Diagnosis Date Noted     Postpartum state 2018     Priority: Medium     Indication for care in labor or delivery 2018     Priority: Medium     Fetal macrosomia during pregnancy in third trimester 2018     Priority: Medium     Supervision of other high risk pregnancies, third trimester 2018     Priority: Medium     Two vessel cord 2018     Priority: Medium     Supervision of normal pregnancy 2017     Priority: Medium      Past Medical History:   Diagnosis Date     Abnormal Pap smear      Miscarriage      Past Surgical History:   Procedure Laterality Date     DILATION AND CURETTAGE      In [place] - Missed AB     Current Outpatient  "Prescriptions   Medication Sig Dispense Refill     ibuprofen (ADVIL/MOTRIN) 800 MG tablet Take 1 tablet (800 mg) by mouth every 6 hours as needed for other (cramping) 90 tablet 1     Prenatal Vit-Fe Fumarate-FA (PRENATAL MULTIVITAMIN PLUS IRON) 27-0.8 MG TABS per tablet Take 1 tablet by mouth daily 100 tablet 3     OTC products: None, except as noted above    No Known Allergies   Latex Allergy: NO    Social History   Substance Use Topics     Smoking status: Never Smoker     Smokeless tobacco: Never Used     Alcohol use No     History   Drug Use No       REVIEW OF SYSTEMS:   CONSTITUTIONAL: NEGATIVE for fever, chills, change in weight  RESP: NEGATIVE for significant cough or SOB  CV: NEGATIVE for chest pain, palpitations or peripheral edema  GI: NEGATIVE for nausea, abdominal pain, heartburn, or change in bowel habits  : NEGATIVE for frequency, dysuria, or hematuria  NEURO: NEGATIVE for weakness, dizziness or paresthesias  HEME: NEGATIVE for bleeding problems  PSYCHIATRIC: NEGATIVE for changes in mood or affect    EXAM:   /68 (BP Location: Left arm, Patient Position: Chair, Cuff Size: Adult Regular)  Ht 5' 9\" (1.753 m)  Wt 207 lb (93.9 kg)  LMP 06/24/2017  BMI 30.57 kg/m2    GENERAL APPEARANCE: healthy, but mild emotional distress/crying     NECK: no adenopathy, no asymmetry, masses, or scars and thyroid normal to palpation     RESP: lungs clear to auscultation - no rales, rhonchi or wheezes     CV: regular rates and rhythm, normal S1 S2, no S3 or S4 and no murmur, click or rub     ABDOMEN:  soft, nontender, no HSM or masses and bowel sounds normal     MS: extremities normal- no gross deformities noted, no evidence of inflammation in joints, FROM in all extremities.     PSYCH: mentation appears normal. and affect normal/bright    PELVIC:   Vulva: normal external female genitalia,   Urethra meatus: normal, non-tender  Vagina: normal vaginal mucosa, rugated, no lesions, normal physiologic discharge.  "   Cervix: multiparous cervix, no lesions.   Minimal blood in vault; no tissue visible.  Uterus: Normal contour, 12-14 wks size, position; non-tender  Adnexa: No adnexal masses palpated, non-tender  Perineum: normal, no lesions, intact  Anus: normal, no lesions, hemorrhoids    DIAGNOSTICS:   hemoglobin    Recent Labs   Lab Test  01/19/18   1517  09/11/17   1636   10/10/14   0950   HGB  11.0*  12.6   < >  12.1   PLT  277  307   < >  250   NA   --    --    --   136   POTASSIUM   --    --    --   3.6   CR   --    --    --   0.55    < > = values in this interval not displayed.        IMPRESSION:   Reason for surgery/procedure: Possible retained placenta; passage of suspected placental tissue at home yesterday.   Ultrasound yesterday with thickened uterine cavity tissue, approximately 7.9 cm thick.    I discussed the findings yesterday PM, and in clinic today with patient and .    Ultrasound cannot confirm placental tissue vs possible clot.   Ultrasound done today in clinic, with intrauterine tissue (clot vs placenta) measuring smaller at 2.0 x 4.1 cm.    Patient without significant bleeding, abnormal vaginal discharge, fever, or uterine tenderness.    We discussed option for surgical management with suction D&C vs close expectant management.    Patient and  very upset regarding suspected retained placenta, states they had a family member who passed away due to retained tissue.    They would like to proceed with suction dilation and curettage; the procedure, risk were discussed in detail; I recommend using ultrasound guidance in the OR due to postpartum uterus, to minimal risk of uterine perforation.    All questions were answered.    The proposed surgical procedure is considered LOW risk.    REVISED CARDIAC RISK INDEX  The patient has the following serious cardiovascular risks for perioperative complications such as (MI, PE, VFib and 3  AV Block):  No serious cardiac risks  INTERPRETATION: 0 risks: Class I  (very low risk - 0.4% complication rate)    The patient has the following additional risks for perioperative complications:  No identified additional risks    No diagnosis found.    RECOMMENDATIONS:         --Patient is to take all scheduled medications on the day of surgery EXCEPT for modifications listed below.    APPROVAL GIVEN to proceed with proposed procedure, without further diagnostic evaluation       Signed Electronically by: Celine Monahan MD    Copy of this evaluation report is provided to requesting physician.    [place] Preop Guidelines

## 2018-04-06 ENCOUNTER — HOSPITAL ENCOUNTER (OUTPATIENT)
Facility: CLINIC | Age: 23
Discharge: HOME | End: 2018-04-06
Attending: OBSTETRICS & GYNECOLOGY | Admitting: OBSTETRICS & GYNECOLOGY
Payer: MEDICAID

## 2018-04-06 ENCOUNTER — HOSPITAL ENCOUNTER (OUTPATIENT)
Dept: ULTRASOUND IMAGING | Facility: CLINIC | Age: 23
End: 2018-04-06
Attending: OBSTETRICS & GYNECOLOGY | Admitting: OBSTETRICS & GYNECOLOGY
Payer: MEDICAID

## 2018-04-06 ENCOUNTER — ANESTHESIA (OUTPATIENT)
Dept: SURGERY | Facility: CLINIC | Age: 23
End: 2018-04-06
Payer: MEDICAID

## 2018-04-06 ENCOUNTER — ANESTHESIA EVENT (OUTPATIENT)
Dept: SURGERY | Facility: CLINIC | Age: 23
End: 2018-04-06
Payer: MEDICAID

## 2018-04-06 ENCOUNTER — INTERPRETER (OUTPATIENT)
Dept: INTERPRETER SERVICES | Facility: CLINIC | Age: 23
End: 2018-04-06
Payer: MEDICAID

## 2018-04-06 VITALS
WEIGHT: 205 LBS | TEMPERATURE: 97.1 F | SYSTOLIC BLOOD PRESSURE: 119 MMHG | BODY MASS INDEX: 30.36 KG/M2 | HEIGHT: 69 IN | OXYGEN SATURATION: 98 % | RESPIRATION RATE: 12 BRPM | DIASTOLIC BLOOD PRESSURE: 71 MMHG | HEART RATE: 86 BPM

## 2018-04-06 DIAGNOSIS — Z98.890 S/P D&C (STATUS POST DILATION AND CURETTAGE): Primary | ICD-10-CM

## 2018-04-06 PROCEDURE — 37000008 ZZH ANESTHESIA TECHNICAL FEE, 1ST 30 MIN: Performed by: OBSTETRICS & GYNECOLOGY

## 2018-04-06 PROCEDURE — 59160 D & C AFTER DELIVERY: CPT | Performed by: OBSTETRICS & GYNECOLOGY

## 2018-04-06 PROCEDURE — 76998 US GUIDE INTRAOP: CPT

## 2018-04-06 PROCEDURE — 71000027 ZZH RECOVERY PHASE 2 EACH 15 MINS: Performed by: OBSTETRICS & GYNECOLOGY

## 2018-04-06 PROCEDURE — 88305 TISSUE EXAM BY PATHOLOGIST: CPT | Performed by: OBSTETRICS & GYNECOLOGY

## 2018-04-06 PROCEDURE — 25000125 ZZHC RX 250: Performed by: OBSTETRICS & GYNECOLOGY

## 2018-04-06 PROCEDURE — 25000125 ZZHC RX 250: Performed by: ANESTHESIOLOGY

## 2018-04-06 PROCEDURE — 25000128 H RX IP 250 OP 636: Performed by: OBSTETRICS & GYNECOLOGY

## 2018-04-06 PROCEDURE — 76499 UNLISTED DX RADIOGRAPHIC PX: CPT

## 2018-04-06 PROCEDURE — 37000009 ZZH ANESTHESIA TECHNICAL FEE, EACH ADDTL 15 MIN: Performed by: OBSTETRICS & GYNECOLOGY

## 2018-04-06 PROCEDURE — 25000125 ZZHC RX 250: Performed by: NURSE ANESTHETIST, CERTIFIED REGISTERED

## 2018-04-06 PROCEDURE — T1013 SIGN LANG/ORAL INTERPRETER: HCPCS | Mod: U3

## 2018-04-06 PROCEDURE — 27210794 ZZH OR GENERAL SUPPLY STERILE: Performed by: OBSTETRICS & GYNECOLOGY

## 2018-04-06 PROCEDURE — 25000128 H RX IP 250 OP 636: Performed by: NURSE ANESTHETIST, CERTIFIED REGISTERED

## 2018-04-06 PROCEDURE — 25000132 ZZH RX MED GY IP 250 OP 250 PS 637: Performed by: OBSTETRICS & GYNECOLOGY

## 2018-04-06 PROCEDURE — 36000050 ZZH SURGERY LEVEL 2 1ST 30 MIN: Performed by: OBSTETRICS & GYNECOLOGY

## 2018-04-06 PROCEDURE — 40000306 ZZH STATISTIC PRE PROC ASSESS II: Performed by: OBSTETRICS & GYNECOLOGY

## 2018-04-06 PROCEDURE — 71000012 ZZH RECOVERY PHASE 1 LEVEL 1 FIRST HR: Performed by: OBSTETRICS & GYNECOLOGY

## 2018-04-06 PROCEDURE — 88305 TISSUE EXAM BY PATHOLOGIST: CPT | Mod: 26 | Performed by: OBSTETRICS & GYNECOLOGY

## 2018-04-06 PROCEDURE — 40000985 US INTRAOPERATIVE

## 2018-04-06 PROCEDURE — 25000128 H RX IP 250 OP 636: Performed by: ANESTHESIOLOGY

## 2018-04-06 PROCEDURE — 25000566 ZZH SEVOFLURANE, EA 15 MIN: Performed by: OBSTETRICS & GYNECOLOGY

## 2018-04-06 PROCEDURE — 36000052 ZZH SURGERY LEVEL 2 EA 15 ADDTL MIN: Performed by: OBSTETRICS & GYNECOLOGY

## 2018-04-06 RX ORDER — DEXAMETHASONE SODIUM PHOSPHATE 4 MG/ML
INJECTION, SOLUTION INTRA-ARTICULAR; INTRALESIONAL; INTRAMUSCULAR; INTRAVENOUS; SOFT TISSUE PRN
Status: DISCONTINUED | OUTPATIENT
Start: 2018-04-06 | End: 2018-04-06

## 2018-04-06 RX ORDER — ACETAMINOPHEN 325 MG/1
975 TABLET ORAL ONCE
Status: COMPLETED | OUTPATIENT
Start: 2018-04-06 | End: 2018-04-06

## 2018-04-06 RX ORDER — ONDANSETRON 2 MG/ML
4 INJECTION INTRAMUSCULAR; INTRAVENOUS EVERY 30 MIN PRN
Status: DISCONTINUED | OUTPATIENT
Start: 2018-04-06 | End: 2018-04-06 | Stop reason: HOSPADM

## 2018-04-06 RX ORDER — AMOXICILLIN 250 MG
1-2 CAPSULE ORAL 2 TIMES DAILY
Qty: 30 TABLET | Refills: 0 | Status: SHIPPED | OUTPATIENT
Start: 2018-04-06 | End: 2018-05-16

## 2018-04-06 RX ORDER — OXYCODONE HYDROCHLORIDE 5 MG/1
5 TABLET ORAL
Status: DISCONTINUED | OUTPATIENT
Start: 2018-04-06 | End: 2018-04-06 | Stop reason: HOSPADM

## 2018-04-06 RX ORDER — SODIUM CHLORIDE, SODIUM LACTATE, POTASSIUM CHLORIDE, CALCIUM CHLORIDE 600; 310; 30; 20 MG/100ML; MG/100ML; MG/100ML; MG/100ML
INJECTION, SOLUTION INTRAVENOUS CONTINUOUS
Status: DISCONTINUED | OUTPATIENT
Start: 2018-04-06 | End: 2018-04-06 | Stop reason: HOSPADM

## 2018-04-06 RX ORDER — HYDROMORPHONE HYDROCHLORIDE 1 MG/ML
.3-.5 INJECTION, SOLUTION INTRAMUSCULAR; INTRAVENOUS; SUBCUTANEOUS EVERY 10 MIN PRN
Status: DISCONTINUED | OUTPATIENT
Start: 2018-04-06 | End: 2018-04-06 | Stop reason: HOSPADM

## 2018-04-06 RX ORDER — GLYCOPYRROLATE 0.2 MG/ML
INJECTION, SOLUTION INTRAMUSCULAR; INTRAVENOUS PRN
Status: DISCONTINUED | OUTPATIENT
Start: 2018-04-06 | End: 2018-04-06

## 2018-04-06 RX ORDER — NALOXONE HYDROCHLORIDE 0.4 MG/ML
.1-.4 INJECTION, SOLUTION INTRAMUSCULAR; INTRAVENOUS; SUBCUTANEOUS
Status: DISCONTINUED | OUTPATIENT
Start: 2018-04-06 | End: 2018-04-06 | Stop reason: HOSPADM

## 2018-04-06 RX ORDER — MEPERIDINE HYDROCHLORIDE 50 MG/ML
12.5 INJECTION INTRAMUSCULAR; INTRAVENOUS; SUBCUTANEOUS
Status: DISCONTINUED | OUTPATIENT
Start: 2018-04-06 | End: 2018-04-06 | Stop reason: HOSPADM

## 2018-04-06 RX ORDER — FENTANYL CITRATE 50 UG/ML
INJECTION, SOLUTION INTRAMUSCULAR; INTRAVENOUS PRN
Status: DISCONTINUED | OUTPATIENT
Start: 2018-04-06 | End: 2018-04-06

## 2018-04-06 RX ORDER — PROPOFOL 10 MG/ML
INJECTION, EMULSION INTRAVENOUS PRN
Status: DISCONTINUED | OUTPATIENT
Start: 2018-04-06 | End: 2018-04-06

## 2018-04-06 RX ORDER — IBUPROFEN 600 MG/1
600 TABLET, FILM COATED ORAL
Status: DISCONTINUED | OUTPATIENT
Start: 2018-04-06 | End: 2018-04-06 | Stop reason: HOSPADM

## 2018-04-06 RX ORDER — FENTANYL CITRATE 50 UG/ML
25-50 INJECTION, SOLUTION INTRAMUSCULAR; INTRAVENOUS
Status: DISCONTINUED | OUTPATIENT
Start: 2018-04-06 | End: 2018-04-06 | Stop reason: HOSPADM

## 2018-04-06 RX ORDER — METHYLERGONOVINE MALEATE 0.2 MG/ML
INJECTION INTRAVENOUS PRN
Status: DISCONTINUED | OUTPATIENT
Start: 2018-04-06 | End: 2018-04-06

## 2018-04-06 RX ORDER — CEFAZOLIN SODIUM 1 G/3ML
1 INJECTION, POWDER, FOR SOLUTION INTRAMUSCULAR; INTRAVENOUS SEE ADMIN INSTRUCTIONS
Status: DISCONTINUED | OUTPATIENT
Start: 2018-04-06 | End: 2018-04-06 | Stop reason: HOSPADM

## 2018-04-06 RX ORDER — OXYCODONE HYDROCHLORIDE 5 MG/1
5-10 TABLET ORAL
Qty: 10 TABLET | Refills: 0 | Status: SHIPPED | OUTPATIENT
Start: 2018-04-06 | End: 2018-04-13

## 2018-04-06 RX ORDER — IBUPROFEN 600 MG/1
600 TABLET, FILM COATED ORAL EVERY 6 HOURS PRN
Qty: 30 TABLET | Refills: 0 | Status: SHIPPED | OUTPATIENT
Start: 2018-04-06 | End: 2018-05-16

## 2018-04-06 RX ORDER — CEFAZOLIN SODIUM 2 G/100ML
2 INJECTION, SOLUTION INTRAVENOUS
Status: COMPLETED | OUTPATIENT
Start: 2018-04-06 | End: 2018-04-06

## 2018-04-06 RX ORDER — ONDANSETRON 4 MG/1
4 TABLET, ORALLY DISINTEGRATING ORAL EVERY 30 MIN PRN
Status: DISCONTINUED | OUTPATIENT
Start: 2018-04-06 | End: 2018-04-06 | Stop reason: HOSPADM

## 2018-04-06 RX ORDER — LIDOCAINE 40 MG/G
CREAM TOPICAL
Status: DISCONTINUED | OUTPATIENT
Start: 2018-04-06 | End: 2018-04-06 | Stop reason: HOSPADM

## 2018-04-06 RX ORDER — ONDANSETRON 2 MG/ML
INJECTION INTRAMUSCULAR; INTRAVENOUS PRN
Status: DISCONTINUED | OUTPATIENT
Start: 2018-04-06 | End: 2018-04-06

## 2018-04-06 RX ADMIN — CEFAZOLIN SODIUM 2 G: 2 INJECTION, SOLUTION INTRAVENOUS at 12:30

## 2018-04-06 RX ADMIN — ACETAMINOPHEN 975 MG: 325 TABLET, FILM COATED ORAL at 11:59

## 2018-04-06 RX ADMIN — FENTANYL CITRATE 100 MCG: 50 INJECTION, SOLUTION INTRAMUSCULAR; INTRAVENOUS at 12:27

## 2018-04-06 RX ADMIN — GLYCOPYRROLATE 0.2 MG: 0.2 INJECTION, SOLUTION INTRAMUSCULAR; INTRAVENOUS at 12:27

## 2018-04-06 RX ADMIN — ONDANSETRON 4 MG: 2 INJECTION INTRAMUSCULAR; INTRAVENOUS at 12:51

## 2018-04-06 RX ADMIN — SODIUM CHLORIDE, POTASSIUM CHLORIDE, SODIUM LACTATE AND CALCIUM CHLORIDE: 600; 310; 30; 20 INJECTION, SOLUTION INTRAVENOUS at 12:53

## 2018-04-06 RX ADMIN — SODIUM CHLORIDE, POTASSIUM CHLORIDE, SODIUM LACTATE AND CALCIUM CHLORIDE: 600; 310; 30; 20 INJECTION, SOLUTION INTRAVENOUS at 11:30

## 2018-04-06 RX ADMIN — PROPOFOL 150 MG: 10 INJECTION, EMULSION INTRAVENOUS at 12:27

## 2018-04-06 RX ADMIN — METHYLERGONOVINE MALEATE 200 MCG: 0.2 INJECTION INTRAMUSCULAR; INTRAVENOUS at 12:45

## 2018-04-06 RX ADMIN — MIDAZOLAM 2 MG: 1 INJECTION INTRAMUSCULAR; INTRAVENOUS at 12:22

## 2018-04-06 RX ADMIN — DEXAMETHASONE SODIUM PHOSPHATE 4 MG: 4 INJECTION, SOLUTION INTRA-ARTICULAR; INTRALESIONAL; INTRAMUSCULAR; INTRAVENOUS; SOFT TISSUE at 12:32

## 2018-04-06 RX ADMIN — LIDOCAINE HYDROCHLORIDE 30 MG: 10 INJECTION, SOLUTION EPIDURAL; INFILTRATION; INTRACAUDAL; PERINEURAL at 12:27

## 2018-04-06 NOTE — ANESTHESIA PREPROCEDURE EVALUATION
Anesthesia Evaluation     .             ROS/MED HX    ENT/Pulmonary:  - neg pulmonary ROS     Neurologic:  - neg neurologic ROS     Cardiovascular:  - neg cardiovascular ROS       METS/Exercise Tolerance:     Hematologic:  - neg hematologic  ROS       Musculoskeletal:  - neg musculoskeletal ROS       GI/Hepatic:  - neg GI/hepatic ROS       Renal/Genitourinary:  - ROS Renal section negative       Endo:  - neg endo ROS       Psychiatric:  - neg psychiatric ROS       Infectious Disease:  - neg infectious disease ROS       Malignancy:      - no malignancy   Other:                     Physical Exam      Airway   Mallampati: II  TM distance: >3 FB  Neck ROM: full    Dental     Cardiovascular   Rhythm and rate: regular and normal  (-) no murmur    Pulmonary    breath sounds clear to auscultation    Other findings: Lab Test        01/19/18     09/11/17     01/22/15     11/04/14                       1517          1636          0645          1610          WBC          8.7          9.9           --          9.6           HGB          11.0*        12.6         8.7*         11.4*         MCV          82           80            --          86            PLT          277          307           --          254            Lab Test        10/10/14                       0950          NA           136           POTASSIUM    3.6           CHLORIDE     104           CO2          27            BUN          5*            CR           0.55          ANIONGAP     5*            GABRIELA          8.7           GLC          105*                        Anesthesia Plan      History & Physical Review  History and physical reviewed and following examination; no interval change.    ASA Status:  1 .    NPO Status:  > 8 hours    Plan for General and LMA with Propofol induction. Maintenance will be Balanced.    PONV prophylaxis:  Ondansetron (or other 5HT-3) and Dexamethasone or Solumedrol       Postoperative Care  Postoperative pain management:  IV  analgesics and Oral pain medications.      Consents  Anesthetic plan, risks, benefits and alternatives discussed with:  Patient..                          .

## 2018-04-06 NOTE — OP NOTE
INDICATION FOR PROCEDURE: Marie Matos is a 23 year old female   Obstetric History       T2      L2     SAB1   TAB0   Ectopic0   Multiple0   Live Births2     status post vaginal delivery, with passage of suspected placental tissue at home following delivery.    ultrasound evaluation following this event was suspicious for retained placental tissue, and she was recommended for further management by suction d&c.    The procedure and risks were outlined in detail, informed consent was obtained and she was therefore taken to the operating room.     PREOPERATIVE DIAGNOSIS: suspected retained placenta  POSTOPERATIVE DIAGNOSIS: uspected retained placenta    SURGEON: Celine Monahan MD   PROCEDURE PERFORMED: suction dilation and curettage with ultrasound guidance  ANESTHESIA: General endotracheal anesthesia.   ESTIMATED BLOOD LOSS: 100 cc   URINE OUTPUT: 300 cc   IV FLUIDS RECEIVED: 1000 cc of crystalloid.   FINDINGS:   10-12 wks size anteverted uterus on examination under anesthesia by bimanual exam.    Small amount of uterine curettings.  SPECIMENS REMOVED:  Suspected retain placenta  COMPLICATIONS: None.   DESCRIPTION OF PROCEDURE: Patient was taken to the operating room where general endotracheal anesthesia was established without difficulty.    She was prepped and draped in the normal sterile fashion in the low lithotomy position. A pause for cause was performed with proper identification of the patient, procedure, and allergies.   Ultrasound guidance was employed. [place] speculum was placed in the patient's vagina.  A single-toothed tenaculum was placed on the anterior lip of the cervix at the 12 o'clock position. The cervix was dilated using the Hanks dilators until a 12 mm curved suction curette could be easily inserted.     The suction device was then activated and the curette was rotated in the uterus until no further products of conception were visibly noted.   A gentle sharp curettage was then  performed at this time.   All specimens were removed from the vagina. Single-tooth tenaculum was removed from the cervix.   Silver nitrate was applied to the tenaculum sites for hemostasis.   Excellent hemostasis was noted. All instruments were removed from the patient's vagina. Sponge, lap and instrument counts were correct x2. The patient tolerated the procedure well. She was taken to the recovery area in stable condition.       RUBY MERCER MD

## 2018-04-06 NOTE — IP AVS SNAPSHOT
[facility] PreOP/PostOP    [address]    [facility] [address]    Phone:  [phone]    Fax:  [phone]                                       After Visit Summary   4/6/2018    Marie Matos    MRN: [id number]           After Visit Summary Signature Page     I have received my discharge instructions, and my questions have been answered. I have discussed any challenges I see with this plan with the nurse or doctor.    ..........................................................................................................................................  Patient/Patient Representative Signature      ..........................................................................................................................................  Patient Representative Print Name and Relationship to Patient    ..................................................               ................................................  Date                                            Time    ..........................................................................................................................................  Reviewed by Signature/Title    ...................................................              ..............................................  Date                                                            Time

## 2018-04-06 NOTE — ANESTHESIA CARE TRANSFER NOTE
Patient: Marie Matos    Procedure(s):  Dilation and Curettage Suction with Ultrasound Guidance   - Wound Class: II-Clean Contaminated    Diagnosis: Retained placenta  Diagnosis Additional Information: No value filed.    Anesthesia Type:   General, LMA     Note:  Airway :Face Mask  Patient transferred to:PACU  Comments: Report and sign off to RN in PACU.  Good resps, skin pink, monitors on, VSS,  O2 via Face Tent.Handoff Report: Identifed the Patient, Identified the Reponsible Provider, Reviewed the pertinent medical history, Discussed the surgical course, Reviewed Intra-OP anesthesia mangement and issues during anesthesia, Set expectations for post-procedure period and Allowed opportunity for questions and acknowledgement of understanding      Vitals: (Last set prior to Anesthesia Care Transfer)    CRNA VITALS  4/6/2018 1226 - 4/6/2018 1303      4/6/2018             Pulse: 78    SpO2: 100 %    Resp Rate (observed): 14    EKG: NSR                Electronically Signed By: SADIE Cisneros CRNA  April 6, 2018  1:03 PM

## 2018-04-06 NOTE — ANESTHESIA POSTPROCEDURE EVALUATION
Patient: Marie Matos    Procedure(s):  Dilation and Curettage Suction with Ultrasound Guidance   - Wound Class: II-Clean Contaminated    Diagnosis:Retained placenta  Diagnosis Additional Information: PREOPERATIVE DIAGNOSIS: suspected retained placenta  POSTOPERATIVE DIAGNOSIS: uspected retained placenta     SURGEON: Celine Monahan MD   PROCEDURE PERFORMED: suction dilation and curettage with ultrasound guidance    Anesthesia Type:  General, LMA    Note:  Anesthesia Post Evaluation    Patient location during evaluation: Phase 2  Patient participation: Able to fully participate in evaluation  Level of consciousness: awake  Pain management: adequate  Airway patency: patent  Cardiovascular status: acceptable  Respiratory status: acceptable  Hydration status: euvolemic  PONV: controlled     Anesthetic complications: None          Last vitals:  Vitals:    04/06/18 1310 04/06/18 1315 04/06/18 1330   BP: 123/69     Pulse:      Resp: 10 15 13   Temp:      SpO2: 100% 100% 99%         Electronically Signed By: Alfredo Perez MD  April 6, 2018  1:59 PM

## 2018-04-06 NOTE — DISCHARGE INSTRUCTIONS
DILATION AND CURETTAGE AND DILATION AND EVACUATION DISCHARGE INSTRUCTIONS    PLEASE RETURN TO THE CLINIC IN:  ____1 WEEK  ____2 WEEKS  ____4 WEEKS  ____6 WEEKS  MAKE THIS APPOINTMENT AFTER YOU GET HOME IF IT HAS NOT ALREADY BEEN SCHEDULED.    DO NOT DRIVE A CAR, DRINK ALCOHOL OR USE MACHINERY FOR THE NEXT 24 HOURS.  YOU SHOULD WAIT UNTIL YOU HAVE RECOVERED BEFORE MAKING ANY IMPORTANT DECISIONS.    PAIN AND DISCOMFORT  YOU MAY HAVE CRAMPS OR A LOW BACKACHE FOR 24 TO 48 HOURS.  TYLENOL (ACETAMINOPHEN) OR MOTRIN (IBUPROFEN) MAY HELP, OR YOUR DOCTOR MAY GIVE YOU PAIN MEDICINE.  CALL YOUR DOCTOR IF PAIN CANNOT BE CONTROLLED.  YOU MAY FEEL DROWSY AND WEAK FOR A DAY OR TWO.    VAGINAL DISCHARGE  YOU MAY HAVE SOME BLEEDING OR DISCHARGE FOR UP TO TWO WEEKS.  DO NOT DOUCHE, USE TAMPONS OR HAVE SEX (INTERCOURSE) IN THE FIRST WEEK.  CALL YOUR DOCTOR IF YOU SOAK MORE THAN ONE MAXI PAD (SANITARY NAPKIN) PER HOUR, OR IF YOU PASS LARGE BLOOD CLOTS.    OTHER SYMPTOMS  YOU MAY HAVE A LOW FEVER FOR THE FIRST TWO DAYS.  CALL YOUR DOCTOR IF YOUR FEVER GOES OVER 101 DEGREES FAHRENHEIT.    IF YOU HAVE NAUSEA (FEEL SICK TO YOUR STOMACH), STAY IN BED.  TRY DRINKING A SMALL AMOUNT 7-UP, TEA OR SOUP.    DIET AND ACTIVITY  EAT LIGHT MEALS AND DRINK PLENTY OF FLUIDS FOR THE FIRST 24 HOURS (OR LONGER, IF YOU HAVE NAUSEA).    YOU MAY BATHE, SHOWER AND CLIMB STAIRS.  MOST WOMEN CAN RETURN TO WORK AFTER 24 HOURS.  YOU MAY GO BACK TO YOUR OTHER ACTIVITIES AFTER YOUR PAIN GOES AWAY.      GENERAL ANESTHESIA OR SEDATION ADULT DISCHARGE INSTRUCTIONS   SPECIAL PRECAUTIONS FOR 24 HOURS AFTER SURGERY    IT IS NOT UNUSUAL TO FEEL LIGHT-HEADED OR FAINT, UP TO 24 HOURS AFTER SURGERY OR WHILE TAKING PAIN MEDICATION.  IF YOU HAVE THESE SYMPTOMS; SIT FOR A FEW MINUTES BEFORE STANDING AND HAVE SOMEONE ASSIST YOU WHEN YOU GET UP TO WALK OR USE THE BATHROOM.    YOU SHOULD REST AND RELAX FOR THE NEXT 24 HOURS AND YOU MUST MAKE ARRANGEMENTS TO HAVE SOMEONE STAY WITH  YOU FOR AT LEAST 24 HOURS AFTER YOUR DISCHARGE.  AVOID HAZARDOUS AND STRENUOUS ACTIVITIES.  DO NOT MAKE IMPORTANT DECISIONS FOR 24 HOURS.    DO NOT DRIVE ANY VEHICLE OR OPERATE MECHANICAL EQUIPMENT FOR 24 HOURS FOLLOWING THE END OF YOUR SURGERY.  EVEN THOUGH YOU MAY FEEL NORMAL, YOUR REACTIONS MAY BE AFFECTED BY THE MEDICATION YOU HAVE RECEIVED.    DO NOT DRINK ALCOHOLIC BEVERAGES FOR 24 HOURS FOLLOWING YOUR SURGERY.    DRINK CLEAR LIQUIDS (APPLE JUICE, GINGER ALE, 7-UP, BROTH, ETC.).  PROGRESS TO YOUR REGULAR DIET AS YOU FEEL ABLE.    YOU MAY HAVE A DRY MOUTH, A SORE THROAT, MUSCLES ACHES OR TROUBLE SLEEPING.  THESE SHOULD GO AWAY AFTER 24 HOURS.    CALL YOUR DOCTOR FOR ANY OF THE FOLLOWING:  SIGNS OF INFECTION (FEVER, GROWING TENDERNESS AT THE SURGERY SITE, A LARGE AMOUNT OF DRAINAGE OR BLEEDING, SEVERE PAIN, FOUL-SMELLING DRAINAGE, REDNESS OR SWELLING.    IT HAS BEEN OVER 8 TO 10 HOURS SINCE SURGERY AND YOU ARE STILL NOT ABLE TO URINATE (PASS WATER).   Maximum acetaminophen (Tylenol) dose from all sources should not exceed 4 grams (4000 mg) per day. You have had 1000 mg today at 1200

## 2018-04-06 NOTE — IP AVS SNAPSHOT
MRN:[id number]                      After Visit Summary   4/6/2018    Marie Matos    MRN: [id number]           Thank you!     Thank you for choosing [facility] for your care. Our goal is always to provide you with excellent care. Hearing back from our patients is one way we can continue to improve our services. Please take a few minutes to complete the written survey that you may receive in the mail after you visit. If you would like to speak to someone directly about your visit please contact Patient Relations at [phone]. Thank you!          Patient Information     Date Of Birth          1995        About your hospital stay     You were admitted on:  April 6, 2018 You last received care in the:  [facility] PreOP/PostOP    You were discharged on:  April 6, 2018       Who to Call     For medical emergencies, please call 911.  For non-urgent questions about your medical care, please call your primary care provider or clinic, [phone]  For questions related to your surgery, please call your surgery clinic        Attending Provider     Provider Celine White MD OB/Gyn       Primary Care Provider Office Phone # Fax #    Celine Monahan [phone]      After Care Instructions     Discharge Instructions       Pelvic Rest. No tampons, douching or intercourse for  2  Weeks.  Patient to arrange follow up appointment in 1-2  Weeks.     Notify physician if severe pain, fever >/=100.4, heavy bleeding (>1pad/hour), foul vaginal discharge.                  Your next 10 appointments already scheduled     Apr 13, 2018  9:45 AM CDT   SHORT with Celine Monahan MD   [facility] ([facility])    [address]  [facility] [address]   [phone]              Further instructions from your care team       DILATION AND CURETTAGE AND DILATION AND EVACUATION DISCHARGE INSTRUCTIONS    PLEASE  RETURN TO THE CLINIC IN:  ____1 WEEK  ____2 WEEKS  ____4 WEEKS  ____6 WEEKS  MAKE THIS APPOINTMENT AFTER YOU GET HOME IF IT HAS NOT ALREADY BEEN SCHEDULED.    DO NOT DRIVE A CAR, DRINK ALCOHOL OR USE MACHINERY FOR THE NEXT 24 HOURS.  YOU SHOULD WAIT UNTIL YOU HAVE RECOVERED BEFORE MAKING ANY IMPORTANT DECISIONS.    PAIN AND DISCOMFORT  YOU MAY HAVE CRAMPS OR A LOW BACKACHE FOR 24 TO 48 HOURS.  TYLENOL (ACETAMINOPHEN) OR MOTRIN (IBUPROFEN) MAY HELP, OR YOUR DOCTOR MAY GIVE YOU PAIN MEDICINE.  CALL YOUR DOCTOR IF PAIN CANNOT BE CONTROLLED.  YOU MAY FEEL DROWSY AND WEAK FOR A DAY OR TWO.    VAGINAL DISCHARGE  YOU MAY HAVE SOME BLEEDING OR DISCHARGE FOR UP TO TWO WEEKS.  DO NOT DOUCHE, USE TAMPONS OR HAVE SEX (INTERCOURSE) IN THE FIRST WEEK.  CALL YOUR DOCTOR IF YOU SOAK MORE THAN ONE MAXI PAD (SANITARY NAPKIN) PER HOUR, OR IF YOU PASS LARGE BLOOD CLOTS.    OTHER SYMPTOMS  YOU MAY HAVE A LOW FEVER FOR THE FIRST TWO DAYS.  CALL YOUR DOCTOR IF YOUR FEVER GOES OVER 101 DEGREES FAHRENHEIT.    IF YOU HAVE NAUSEA (FEEL SICK TO YOUR STOMACH), STAY IN BED.  TRY DRINKING A SMALL AMOUNT 7-UP, TEA OR SOUP.    DIET AND ACTIVITY  EAT LIGHT MEALS AND DRINK PLENTY OF FLUIDS FOR THE FIRST 24 HOURS (OR LONGER, IF YOU HAVE NAUSEA).    YOU MAY BATHE, SHOWER AND CLIMB STAIRS.  MOST WOMEN CAN RETURN TO WORK AFTER 24 HOURS.  YOU MAY GO BACK TO YOUR OTHER ACTIVITIES AFTER YOUR PAIN GOES AWAY.      GENERAL ANESTHESIA OR SEDATION ADULT DISCHARGE INSTRUCTIONS   SPECIAL PRECAUTIONS FOR 24 HOURS AFTER SURGERY    IT IS NOT UNUSUAL TO FEEL LIGHT-HEADED OR FAINT, UP TO 24 HOURS AFTER SURGERY OR WHILE TAKING PAIN MEDICATION.  IF YOU HAVE THESE SYMPTOMS; SIT FOR A FEW MINUTES BEFORE STANDING AND HAVE SOMEONE ASSIST YOU WHEN YOU GET UP TO WALK OR USE THE BATHROOM.    YOU SHOULD REST AND RELAX FOR THE NEXT 24 HOURS AND YOU MUST MAKE ARRANGEMENTS TO HAVE SOMEONE STAY WITH YOU FOR AT LEAST 24 HOURS AFTER YOUR DISCHARGE.  AVOID HAZARDOUS AND STRENUOUS  "ACTIVITIES.  DO NOT MAKE IMPORTANT DECISIONS FOR 24 HOURS.    DO NOT DRIVE ANY VEHICLE OR OPERATE MECHANICAL EQUIPMENT FOR 24 HOURS FOLLOWING THE END OF YOUR SURGERY.  EVEN THOUGH YOU MAY FEEL NORMAL, YOUR REACTIONS MAY BE AFFECTED BY THE MEDICATION YOU HAVE RECEIVED.    DO NOT DRINK ALCOHOLIC BEVERAGES FOR 24 HOURS FOLLOWING YOUR SURGERY.    DRINK CLEAR LIQUIDS (APPLE JUICE, GINGER ALE, 7-UP, BROTH, ETC.).  PROGRESS TO YOUR REGULAR DIET AS YOU FEEL ABLE.    YOU MAY HAVE A DRY MOUTH, A SORE THROAT, MUSCLES ACHES OR TROUBLE SLEEPING.  THESE SHOULD GO AWAY AFTER 24 HOURS.    CALL YOUR DOCTOR FOR ANY OF THE FOLLOWING:  SIGNS OF INFECTION (FEVER, GROWING TENDERNESS AT THE SURGERY SITE, A LARGE AMOUNT OF DRAINAGE OR BLEEDING, SEVERE PAIN, FOUL-SMELLING DRAINAGE, REDNESS OR SWELLING.    IT HAS BEEN OVER 8 TO 10 HOURS SINCE SURGERY AND YOU ARE STILL NOT ABLE TO URINATE (PASS WATER).   Maximum acetaminophen (Tylenol) dose from all sources should not exceed 4 grams (4000 mg) per day. You have had 1000 mg today at 1200            Pending Results     Date and Time Order Name Status Description    4/6/2018 1308 Placenta path order and indications In process     4/6/2018 1245 Surgical pathology exam In process             Admission Information     Date & Time Provider Department Dept. Phone    4/6/2018 Celine Monahan MD [facility] PreOP/PostOP [phone]      Your Vitals Were     Blood Pressure Pulse Temperature Respirations Height Weight    120/67 (BP Location: Right arm) 86 97  F (36.1  C) (Temporal) 12 1.753 m (5' 9\") 93 kg (205 lb)    Last Period Pulse Oximetry BMI (Body Mass Index)             06/24/2017 96% 30.27 kg/m2         [email or web address] Information     [employer] gives you secure access to your electronic health record. If you see a primary care provider, you can also send messages to your care team and make appointments. If you have questions, please call your primary care clinic.  If you do not have a primary " care provider, please call [phone] and they will assist you.        Care EveryWhere ID     This is your Care EveryWhere ID. This could be used by other organizations to access your [facility] medical records  [id number]        Equal Access to Services     МАРИНА CANO : Valeria zaman damir Sochepe, waromada luqadaha, qaybta kaalmada kamla, bryce noein hayaachichi krugernorris singleton mag stephens. So [facility] [place] [phone].    ATENCIÓN: Si habla español, tiene a montes disposición servicios gratuitos de asistencia lingüística. Llame al [phone].    We comply with applicable federal civil rights laws and [place] laws. We do not discriminate on the basis of race, color, national origin, age, disability, sex, sexual orientation, or gender identity.               Review of your medicines      START taking        Dose / Directions    oxyCODONE IR 5 MG tablet   Commonly known as:  ROXICODONE   Used for:  S/P D&C (status post dilation and curettage)        Dose:  5-10 mg   Take 1-2 tablets (5-10 mg) by mouth every 3 hours as needed for pain or other (Moderate to Severe)   Quantity:  10 tablet   Refills:  0       senna-docusate 8.6-50 MG per tablet   Commonly known as:  SENOKOT-S;PERICOLACE   Used for:  S/P D&C (status post dilation and curettage)        Dose:  1-2 tablet   Take 1-2 tablets by mouth 2 times daily Take while on oral narcotics to prevent or treat constipation.   Quantity:  30 tablet   Refills:  0         CONTINUE these medicines which may have CHANGED, or have new prescriptions. If we are uncertain of the size of tablets/capsules you have at home, strength may be listed as something that might have changed.        Dose / Directions    * ibuprofen 800 MG tablet   Commonly known as:  ADVIL/MOTRIN   This may have changed:  Another medication with the same name was added. Make sure you understand how and when to take each.   Used for:   (spontaneous vaginal delivery)        Dose:  800 mg   Take 1 tablet (800 mg) by  mouth every 6 hours as needed for other (cramping)   Quantity:  90 tablet   Refills:  1       * ibuprofen 600 MG tablet   Commonly known as:  ADVIL/MOTRIN   This may have changed:  You were already taking a medication with the same name, and this prescription was added. Make sure you understand how and when to take each.   Used for:  S/P D&C (status post dilation and curettage)        Dose:  600 mg   Take 1 tablet (600 mg) by mouth every 6 hours as needed for pain (mild)   Quantity:  30 tablet   Refills:  0       * Notice:  This list has 2 medication(s) that are the same as other medications prescribed for you. Read the directions carefully, and ask your doctor or other care provider to review them with you.      CONTINUE these medicines which have NOT CHANGED        Dose / Directions    prenatal multivitamin plus iron 27-0.8 MG Tabs per tablet   Used for:  Encounter for supervision of other normal pregnancy in first trimester        Dose:  1 tablet   Take 1 tablet by mouth daily   Quantity:  100 tablet   Refills:  3            Where to get your medicines      [place] [facility] [place] [address] [address] [address]     Phone:  [phone]     ibuprofen 600 MG tablet    senna-docusate 8.6-50 MG per tablet         Some of these will need a paper prescription and others can be bought over the counter. Ask your nurse if you have questions.     Bring a paper prescription for each of these medications     oxyCODONE IR 5 MG tablet                Protect others around you: Learn how to safely use, store and throw away your medicines at www.disposemymeds.org.        Information about OPIOIDS     PRESCRIPTION OPIOIDS: WHAT YOU NEED TO KNOW    Prescription opioids can be used to help relieve moderate to severe pain and are often prescribed following a surgery or injury, or for certain health conditions. These medications can be an  important part of treatment but also come with serious risks. It is important to work with your health care provider to make sure you are getting the safest, most effective care.    WHAT ARE THE RISKS AND SIDE EFFECTS OF OPIOID USE?  Prescription opioids carry serious risks of addiction and overdose, especially with prolonged use. An opioid overdose, often marked by slowed breathing can cause sudden death. The use of prescription opioids can have a number of side effects as well, even when taken as directed:      Tolerance - meaning you might need to take more of a medication for the same pain relief    Physical dependence - meaning you have symptoms of withdrawal when a medication is stopped    Increased sensitivity to pain    Constipation    Nausea, vomiting, and dry mouth    Sleepiness and dizziness    Confusion    Depression    Low levels of testosterone that can result in lower sex drive, energy, and strength    Itching and sweating    RISKS ARE GREATER WITH:    History of drug misuse, substance use disorder, or overdose    Mental health conditions (such as depression or anxiety)    Sleep apnea    Older age (65 years or older)    Pregnancy    Avoid alcohol while taking prescription opioids.   Also, unless specifically advised by your health care provider, medications to avoid include:    Benzodiazepines (such as Xanax or Valium)    Muscle relaxants (such as Soma or Flexeril)    Hypnotics (such as Ambien or Lunesta)    Other prescription opioids    KNOW YOUR OPTIONS:  Talk to your health care provider about ways to manage your pain that do not involve prescription opioids. Some of these options may actually work better and have fewer risks and side effects:    Pain relievers such as acetaminophen, ibuprofen, and naproxen    Some medications that are also used for depression or seizures    Physical therapy and exercise    Cognitive behavioral therapy, a psychological, goal-directed approach, in which patients  learn how to modify physical, behavioral, and emotional triggers of pain and stress    IF YOU ARE PRESCRIBED OPIOIDS FOR PAIN:    Never take opioids in greater amounts or more often than prescribed    Follow up with your primary health care provider and work together to create a plan on how to manage your pain.    Talk about ways to help manage your pain that do not involve prescription opioids    Talk about all concerns and side effects    Help prevent misuse and abuse    Never sell or share prescription opioids    Never use another person's prescription opioids    Store prescription opioids in a secure place and out of reach of others (this may include visitors, children, friends, and family)    Visit www.cdc.gov/drugoverdose to learn about risks of opioid abuse and overdose    If you believe you may be struggling with addiction, tell your health care provider and ask for guidance or call [facility]'s National Helpline at 7-642-552-HELP    LEARN MORE / www.cdc.gov/drugoverdose/prescribing/guideline.html    Safely dispose of unused prescription opioids: Find your local drug take-back programs and more information about the importance of safe disposal at www.doseofreality.[place].gov             Medication List: This is a list of all your medications and when to take them. Check marks below indicate your daily home schedule. Keep this list as a reference.      Medications           Morning Afternoon Evening Bedtime As Needed    * ibuprofen 800 MG tablet   Commonly known as:  ADVIL/MOTRIN   Take 1 tablet (800 mg) by mouth every 6 hours as needed for other (cramping)                                * ibuprofen 600 MG tablet   Commonly known as:  ADVIL/MOTRIN   Take 1 tablet (600 mg) by mouth every 6 hours as needed for pain (mild)                                oxyCODONE IR 5 MG tablet   Commonly known as:  ROXICODONE   Take 1-2 tablets (5-10 mg) by mouth every 3 hours as needed for pain or other (Moderate to Severe)                                 prenatal multivitamin plus iron 27-0.8 MG Tabs per tablet   Take 1 tablet by mouth daily                                senna-docusate 8.6-50 MG per tablet   Commonly known as:  SENOKOT-S;PERICOLACE   Take 1-2 tablets by mouth 2 times daily Take while on oral narcotics to prevent or treat constipation.                                * Notice:  This list has 2 medication(s) that are the same as other medications prescribed for you. Read the directions carefully, and ask your doctor or other care provider to review them with you.

## 2018-04-09 LAB — COPATH REPORT: NORMAL

## 2018-04-13 ENCOUNTER — OFFICE VISIT (OUTPATIENT)
Dept: OBGYN | Facility: CLINIC | Age: 23
End: 2018-04-13
Payer: MEDICAID

## 2018-04-13 VITALS
WEIGHT: 203.8 LBS | DIASTOLIC BLOOD PRESSURE: 58 MMHG | TEMPERATURE: 98.3 F | SYSTOLIC BLOOD PRESSURE: 90 MMHG | HEART RATE: 60 BPM | BODY MASS INDEX: 30.1 KG/M2

## 2018-04-13 DIAGNOSIS — Z48.89 AFTERCARE FOLLOWING SURGERY: Primary | ICD-10-CM

## 2018-04-13 PROCEDURE — T1013 SIGN LANG/ORAL INTERPRETER: HCPCS | Mod: U3 | Performed by: OBSTETRICS & GYNECOLOGY

## 2018-04-13 PROCEDURE — 99024 POSTOP FOLLOW-UP VISIT: CPT | Performed by: OBSTETRICS & GYNECOLOGY

## 2018-04-13 NOTE — NURSING NOTE
"Chief Complaint   Patient presents with     Surgical Followup   Baby girl Netta born 3[birth year]    D&C for retained placenta 18  Doing well, here today with family and     initial BP 90/58  Pulse 60  Wt 203 lb 12.8 oz (92.4 kg)  LMP 2017  BMI 30.1 kg/m2 Estimated body mass index is 30.1 kg/(m^2) as calculated from the following:    Height as of 18: 5' 9\" (1.753 m).    Weight as of this encounter: 203 lb 12.8 oz (92.4 kg).  BP completed using cuff size regular.  Radha Ribeiro CMA    "

## 2018-04-13 NOTE — MR AVS SNAPSHOT
After Visit Summary   4/13/2018    Marie Matos    MRN: [id number]           Patient Information     Date Of Birth          1995        Visit Information        Provider Department      4/13/2018 9:45 AM Celine Monahan MD; [employer] [facility]         Follow-ups after your visit        Your next 10 appointments already scheduled     May 16, 2018  8:30 AM CDT   SHORT with Celine Monahan MD   [facility] ([facility])    [address]  [facility] [id number] [phone]              Who to contact     If you have questions or need follow up information about today's clinic visit or your schedule please contact [facility] directly at [phone].  Normal or non-critical lab and imaging results will be communicated to you by MyChart, letter or phone within 4 business days after the clinic has received the results. If you do not hear from us within 7 days, please contact the clinic through MyChart or phone. If you have a critical or abnormal lab result, we will notify you by phone as soon as possible.  Submit refill requests through MP[employer] Mobile or call your pharmacy and they will forward the refill request to us. Please allow 3 business days for your refill to be completed.          Additional Information About Your Visit        MyChart Information     [employer] gives you secure access to your electronic health record. If you see a primary care provider, you can also send messages to your care team and make appointments. If you have questions, please call your primary care clinic.  If you do not have a primary care provider, please call [phone] and they will assist you.        Care EveryWhere ID     This is your Care EveryWhere ID. This could be used by other organizations to access your [facility] medical records  [id number]        Your Vitals Were     Pulse Temperature Last Period BMI  (Body Mass Index)          60 98.3  F (36.8  C) (Oral) 2017 30.1 kg/m2         Blood Pressure from Last 3 Encounters:   18 90/58   18 119/71   18 108/68    Weight from Last 3 Encounters:   18 203 lb 12.8 oz (92.4 kg)   18 205 lb (93 kg)   18 207 lb (93.9 kg)              Today, you had the following     No orders found for display       Primary Care Provider Office Phone # Fax #    Celine Monahan [phone] [address] [place] [address]        Equal Access to Services     [facility]: Valeria Ambrose, wajoesph chávez, qaybolimpia kaalmakeyonna cason, bryce hathaway . So [facility] [phone].    ATENCIÓN: Si habla español, tiene a montes disposición servicios gratuitos de asistencia lingüística. Llame al [phone].    We comply with applicable federal civil rights laws and [place] laws. We do not discriminate on the basis of race, color, national origin, age, disability, sex, sexual orientation, or gender identity.            Thank you!     Thank you for choosing [facility]  for your care. Our goal is always to provide you with excellent care. Hearing back from our patients is one way we can continue to improve our services. Please take a few minutes to complete the written survey that you may receive in the mail after your visit with us. Thank you!             Your Updated Medication List - Protect others around you: Learn how to safely use, store and throw away your medicines at www.disposemymeds.org.          This list is accurate as of 18 10:20 AM.  Always use your most recent med list.                   Brand Name Dispense Instructions for use Diagnosis    * ibuprofen 800 MG tablet    ADVIL/MOTRIN    90 tablet    Take 1 tablet (800 mg) by mouth every 6 hours as needed for other (cramping)     (spontaneous vaginal delivery)       * ibuprofen 600 MG tablet     ADVIL/MOTRIN    30 tablet    Take 1 tablet (600 mg) by mouth every 6 hours as needed for pain (mild)    S/P D&C (status post dilation and curettage)       prenatal multivitamin plus iron 27-0.8 MG Tabs per tablet     100 tablet    Take 1 tablet by mouth daily    Encounter for supervision of other normal pregnancy in first trimester       senna-docusate 8.6-50 MG per tablet    SENOKOT-S;PERICOLACE    30 tablet    Take 1-2 tablets by mouth 2 times daily Take while on oral narcotics to prevent or treat constipation.    S/P D&C (status post dilation and curettage)       * Notice:  This list has 2 medication(s) that are the same as other medications prescribed for you. Read the directions carefully, and ask your doctor or other care provider to review them with you.

## 2018-04-13 NOTE — PROGRESS NOTES
Subjective: 23 year old female   Obstetric History       T2      L2     SAB1   TAB0   Ectopic0   Multiple0   Live Births2     status post suction d&c on 18 for retained placenta, here for post-op check.      Doing well, denies fever, abnormal vaginal discharge.     Good pain control.    States minimal vaginal bleeding.      Objective:  EXAM:    Constitutional: healthy, alert and no distress  Gastrointestinal: Abdomen soft, non-tender.           Assessment/Plan:  V67.00C Surgery Follow-Up Examination  (primary encounter diagnosis)  Comment:  Doing well, no complaints.   Pathology results reviewed; consistent with retained tissue.  Chronic endometritis also noted, likely consistent with diagnosis, do no suspect additional management is needed.     Plan:follow-up for 6 wks postpartum visit.

## 2018-05-16 ENCOUNTER — OFFICE VISIT (OUTPATIENT)
Dept: OBGYN | Facility: CLINIC | Age: 23
End: 2018-05-16
Payer: MEDICAID

## 2018-05-16 VITALS
WEIGHT: 207.6 LBS | HEART RATE: 76 BPM | DIASTOLIC BLOOD PRESSURE: 54 MMHG | BODY MASS INDEX: 30.66 KG/M2 | SYSTOLIC BLOOD PRESSURE: 96 MMHG

## 2018-05-16 LAB
SPECIMEN SOURCE: NORMAL
WET PREP SPEC: NORMAL

## 2018-05-16 PROCEDURE — T1013 SIGN LANG/ORAL INTERPRETER: HCPCS | Mod: U3 | Performed by: OBSTETRICS & GYNECOLOGY

## 2018-05-16 PROCEDURE — 87210 SMEAR WET MOUNT SALINE/INK: CPT | Performed by: OBSTETRICS & GYNECOLOGY

## 2018-05-16 PROCEDURE — 99207 ZZC POST PARTUM EXAM: CPT | Performed by: OBSTETRICS & GYNECOLOGY

## 2018-05-16 NOTE — PROGRESS NOTES
SUBJECTIVE: Marie is here for a 6-week postpartum checkup.    Delivery date was 3/28/2018. She had a  of a viable girl, weight 8 pounds 10.6 oz., with retained placenta complications.  Since delivery, she has been breast feeding.  She has no signs of infection, bleeding or other complications.  She is not pregnant.  We discussed contraceptions and she has chosen condoms.  She  has had intercourse since delivery and complains of NA discomfort. Patient screened for postpartum depression and complaints are NEGATIVE. Screening has also been completed for intimate partner violence.    Patient reports increased vaginal discharge x  4 wks; states +odor, pruritis.       EXAM:  Today's Depression Rating was   PHQ-9 SCORE 2018   Total Score 1       GENERAL healthy, alert and no distress  NECK: no adenopathy, no asymmetry, masses, or scars, thyroid normal to palpation and trachea midline and normal to palpation  GI: no masses palpable, no organomegaly, soft, non-tender, symmetric and umbilicus normal  GYN PELVIC: NEGATIVE, normal external genitalia, normal urethral meatus, normal vaginal mucosa, normal cervix, normal adnexa, no masses or tenderness, uterus normal size and shape and uterus antiverted    ASSESSMENT:   Normal postpartum exam after  and d&c for retained placental tissue; doing well.    Wet prep collected for r/o vaginitis.    PLAN:  Return as needed or at time of next expected pap, pelvic, or breast exam.

## 2018-05-16 NOTE — MR AVS SNAPSHOT
After Visit Summary   5/16/2018    Marie Matos    MRN: [id number]           Patient Information     Date Of Birth          1995        Visit Information        Provider Department      5/16/2018 8:30 AM Celine Monahan MD; [employer] [facility]        Today's Diagnoses     Routine postpartum follow-up    -  1       Follow-ups after your visit        Who to contact     If you have questions or need follow up information about today's clinic visit or your schedule please contact [facility] directly at [phone].  Normal or non-critical lab and imaging results will be communicated to you by Twillionhart, letter or phone within 4 business days after the clinic has received the results. If you do not hear from us within 7 days, please contact the clinic through Solaiemest or phone. If you have a critical or abnormal lab result, we will notify you by phone as soon as possible.  Submit refill requests through P[employer]ar or call your pharmacy and they will forward the refill request to us. Please allow 3 business days for your refill to be completed.          Additional Information About Your Visit        MyChart Information     [employer] gives you secure access to your electronic health record. If you see a primary care provider, you can also send messages to your care team and make appointments. If you have questions, please call your primary care clinic.  If you do not have a primary care provider, please call [phone] and they will assist you.        Care EveryWhere ID     This is your Care EveryWhere ID. This could be used by other organizations to access your [facility] medical records  [id number]        Your Vitals Were     Pulse Breastfeeding? BMI (Body Mass Index)             76 Yes 30.66 kg/m2          Blood Pressure from Last 3 Encounters:   05/16/18 96/54   04/13/18 90/58   04/06/18 119/71    Weight from Last 3 Encounters:   05/16/18 207  lb 9.6 oz (94.2 kg)   04/13/18 203 lb 12.8 oz (92.4 kg)   04/06/18 205 lb (93 kg)              We Performed the Following     Wet prep        Primary Care Provider Office Phone # Fax #    Celine Monahan [phone] [address] [place] [address]        Equal Access to Services     [facility]: Hadii aad ku hadasho Soomaali, waaxda luqadaha, qaybta kaalmada adeegyada, waxay idiin hayaan adeeg kharash la'aan ah. So [facility] [phone].    ATENCIÓN: Si habla español, tiene a montes disposición servicios gratuitos de asistencia lingüística. Llame al [phone].    We comply with applicable federal civil rights laws and [place] laws. We do not discriminate on the basis of race, color, national origin, age, disability, sex, sexual orientation, or gender identity.            Thank you!     Thank you for choosing [facility]  for your care. Our goal is always to provide you with excellent care. Hearing back from our patients is one way we can continue to improve our services. Please take a few minutes to complete the written survey that you may receive in the mail after your visit with us. Thank you!             Your Updated Medication List - Protect others around you: Learn how to safely use, store and throw away your medicines at www.disposemymeds.org.          This list is accurate as of 5/16/18  9:32 AM.  Always use your most recent med list.                   Brand Name Dispense Instructions for use Diagnosis    prenatal multivitamin plus iron 27-0.8 MG Tabs per tablet     100 tablet    Take 1 tablet by mouth daily    Encounter for supervision of other normal pregnancy in first trimester

## 2018-05-17 ASSESSMENT — PATIENT HEALTH QUESTIONNAIRE - PHQ9: SUM OF ALL RESPONSES TO PHQ QUESTIONS 1-9: 1

## 2020-03-02 ENCOUNTER — HEALTH MAINTENANCE LETTER (OUTPATIENT)
Age: 25
End: 2020-03-02

## 2020-11-03 ENCOUNTER — APPOINTMENT (OUTPATIENT)
Dept: INTERPRETER SERVICES | Facility: CLINIC | Age: 25
End: 2020-11-03
Payer: COMMERCIAL

## 2020-11-03 ENCOUNTER — OFFICE VISIT (OUTPATIENT)
Dept: INTERNAL MEDICINE | Facility: CLINIC | Age: 25
End: 2020-11-03
Payer: COMMERCIAL

## 2020-11-03 VITALS
SYSTOLIC BLOOD PRESSURE: 103 MMHG | HEIGHT: 68 IN | HEART RATE: 78 BPM | OXYGEN SATURATION: 100 % | WEIGHT: 228.8 LBS | TEMPERATURE: 98.5 F | DIASTOLIC BLOOD PRESSURE: 65 MMHG | BODY MASS INDEX: 34.68 KG/M2 | RESPIRATION RATE: 16 BRPM

## 2020-11-03 DIAGNOSIS — J30.2 SEASONAL ALLERGIC RHINITIS, UNSPECIFIED TRIGGER: ICD-10-CM

## 2020-11-03 DIAGNOSIS — K21.9 GASTROESOPHAGEAL REFLUX DISEASE WITHOUT ESOPHAGITIS: Primary | ICD-10-CM

## 2020-11-03 PROCEDURE — T1013 SIGN LANG/ORAL INTERPRETER: HCPCS | Performed by: NURSE PRACTITIONER

## 2020-11-03 PROCEDURE — 99203 OFFICE O/P NEW LOW 30 MIN: CPT | Performed by: NURSE PRACTITIONER

## 2020-11-03 ASSESSMENT — MIFFLIN-ST. JEOR: SCORE: 1831.33

## 2020-11-03 NOTE — PROGRESS NOTES
"Sumi Matos is a 25 year old female who presents to clinic today for the following health issues:    HPI         Concern - change in taste, stomach pain after eating  Onset: 2 months  Description: poor taste, reflux, smell is fine  Intensity: mild  Progression of Symptoms:  same  Accompanying Signs & Symptoms: afebrile, no URI sx, no Covid exposure  Previous history of similar problem: non  Precipitating factors:        Worsened by: none  Alleviating factors:        Improved by: none  Therapies tried and outcome:  none         Review of Systems   CONSTITUTIONAL: NEGATIVE for fever, chills, change in weight  ENT/MOUTH: NEGATIVE for ear, mouth and throat problems  RESP: NEGATIVE for significant cough or SOB  CV: NEGATIVE for chest pain, palpitations or peripheral edema  : normal menstrual cycles  PSYCHIATRIC: NEGATIVE for changes in mood or affect      Objective    /65 (BP Location: Right arm, Patient Position: Sitting, Cuff Size: Adult Large)   Pulse 78   Temp 98.5  F (36.9  C) (Oral)   Resp 16   Ht 1.727 m (5' 8\")   Wt 103.8 kg (228 lb 12.8 oz)   SpO2 100%   BMI 34.79 kg/m    Body mass index is 34.79 kg/m .  Physical Exam   GENERAL: alert, no distress and obese  EYES: Eyes grossly normal to inspection, PERRL and conjunctivae and sclerae normal  NECK: no adenopathy, no asymmetry, masses, or scars and thyroid normal to palpation  RESP: lungs clear to auscultation - no rales, rhonchi or wheezes  CV: regular rate and rhythm, normal S1 S2, no S3 or S4, no murmur, click or rub, no peripheral edema and peripheral pulses strong  PSYCH: mentation appears normal, affect normal/bright            Assessment & Plan     (K21.9) Gastroesophageal reflux disease without esophagitis  (primary encounter diagnosis)  Comment:   Plan:     (J30.2) Seasonal allergic rhinitis, unspecified trigger  Comment:   Plan:          BMI:   Estimated body mass index is 34.79 kg/m  as calculated from the following:    " "Height as of this encounter: 1.727 m (5' 8\").    Weight as of this encounter: 103.8 kg (228 lb 12.8 oz).            Patient Instructions   OTC prilosec  OTC flonase nasal spray  Kandy Chávez, RENUKA  [facility]    "

## 2020-11-03 NOTE — NURSING NOTE
"lower abdominal pain for 2 months, nauseous in the morning and sometimes after eating.  headaches for 2 months.  Vital signs:  Temp: 98.5  F (36.9  C) Temp src: Oral BP: 103/65 Pulse: 78   Resp: 16 SpO2: 100 %     Height: 172.7 cm (5' 8\") Weight: 103.8 kg (228 lb 12.8 oz)  Estimated body mass index is 34.79 kg/m  as calculated from the following:    Height as of this encounter: 1.727 m (5' 8\").    Weight as of this encounter: 103.8 kg (228 lb 12.8 oz).          "

## 2020-12-20 ENCOUNTER — HEALTH MAINTENANCE LETTER (OUTPATIENT)
Age: 25
End: 2020-12-20

## 2021-04-05 ENCOUNTER — NURSE TRIAGE (OUTPATIENT)
Dept: NURSING | Facility: CLINIC | Age: 26
End: 2021-04-05

## 2021-04-05 PROCEDURE — 99284 EMERGENCY DEPT VISIT MOD MDM: CPT | Mod: 25

## 2021-04-05 ASSESSMENT — MIFFLIN-ST. JEOR: SCORE: 1802.29

## 2021-04-06 ENCOUNTER — APPOINTMENT (OUTPATIENT)
Dept: ULTRASOUND IMAGING | Facility: CLINIC | Age: 26
End: 2021-04-06
Attending: EMERGENCY MEDICINE
Payer: COMMERCIAL

## 2021-04-06 ENCOUNTER — HOSPITAL ENCOUNTER (EMERGENCY)
Facility: CLINIC | Age: 26
Discharge: HOME | End: 2021-04-06
Attending: EMERGENCY MEDICINE | Admitting: EMERGENCY MEDICINE
Payer: COMMERCIAL

## 2021-04-06 VITALS
WEIGHT: 220 LBS | HEART RATE: 65 BPM | HEIGHT: 69 IN | SYSTOLIC BLOOD PRESSURE: 110 MMHG | DIASTOLIC BLOOD PRESSURE: 55 MMHG | TEMPERATURE: 97.6 F | OXYGEN SATURATION: 100 % | RESPIRATION RATE: 18 BRPM | BODY MASS INDEX: 32.58 KG/M2

## 2021-04-06 DIAGNOSIS — O20.0 THREATENED MISCARRIAGE IN EARLY PREGNANCY: ICD-10-CM

## 2021-04-06 LAB
ANION GAP SERPL CALCULATED.3IONS-SCNC: 5 MMOL/L (ref 3–14)
B-HCG SERPL-ACNC: ABNORMAL IU/L (ref 0–5)
BASOPHILS # BLD AUTO: 0 10E9/L (ref 0–0.2)
BASOPHILS NFR BLD AUTO: 0.3 %
BUN SERPL-MCNC: 6 MG/DL (ref 7–30)
CALCIUM SERPL-MCNC: 8.7 MG/DL (ref 8.5–10.1)
CHLORIDE SERPL-SCNC: 107 MMOL/L (ref 94–109)
CO2 SERPL-SCNC: 24 MMOL/L (ref 20–32)
CREAT SERPL-MCNC: 0.5 MG/DL (ref 0.52–1.04)
DIFFERENTIAL METHOD BLD: NORMAL
EOSINOPHIL # BLD AUTO: 0.2 10E9/L (ref 0–0.7)
EOSINOPHIL NFR BLD AUTO: 1.8 %
ERYTHROCYTE [DISTWIDTH] IN BLOOD BY AUTOMATED COUNT: 14.1 % (ref 10–15)
GFR SERPL CREATININE-BSD FRML MDRD: >90 ML/MIN/{1.73_M2}
GLUCOSE SERPL-MCNC: 97 MG/DL (ref 70–99)
HCT VFR BLD AUTO: 38.4 % (ref 35–47)
HGB BLD-MCNC: 12.7 G/DL (ref 11.7–15.7)
IMM GRANULOCYTES # BLD: 0 10E9/L (ref 0–0.4)
IMM GRANULOCYTES NFR BLD: 0.3 %
LYMPHOCYTES # BLD AUTO: 3.7 10E9/L (ref 0.8–5.3)
LYMPHOCYTES NFR BLD AUTO: 34.7 %
MCH RBC QN AUTO: 26.9 PG (ref 26.5–33)
MCHC RBC AUTO-ENTMCNC: 33.1 G/DL (ref 31.5–36.5)
MCV RBC AUTO: 81 FL (ref 78–100)
MONOCYTES # BLD AUTO: 0.7 10E9/L (ref 0–1.3)
MONOCYTES NFR BLD AUTO: 6.5 %
NEUTROPHILS # BLD AUTO: 6.1 10E9/L (ref 1.6–8.3)
NEUTROPHILS NFR BLD AUTO: 56.4 %
NRBC # BLD AUTO: 0 10*3/UL
NRBC BLD AUTO-RTO: 0 /100
PLATELET # BLD AUTO: 296 10E9/L (ref 150–450)
POTASSIUM SERPL-SCNC: 3.7 MMOL/L (ref 3.4–5.3)
RBC # BLD AUTO: 4.72 10E12/L (ref 3.8–5.2)
SODIUM SERPL-SCNC: 136 MMOL/L (ref 133–144)
WBC # BLD AUTO: 10.8 10E9/L (ref 4–11)

## 2021-04-06 PROCEDURE — 80048 BASIC METABOLIC PNL TOTAL CA: CPT | Performed by: EMERGENCY MEDICINE

## 2021-04-06 PROCEDURE — 76801 OB US < 14 WKS SINGLE FETUS: CPT

## 2021-04-06 PROCEDURE — 85025 COMPLETE CBC W/AUTO DIFF WBC: CPT | Performed by: EMERGENCY MEDICINE

## 2021-04-06 PROCEDURE — 84702 CHORIONIC GONADOTROPIN TEST: CPT | Performed by: EMERGENCY MEDICINE

## 2021-04-06 ASSESSMENT — ENCOUNTER SYMPTOMS
ABDOMINAL PAIN: 1
DYSURIA: 0
DIFFICULTY URINATING: 0
FREQUENCY: 0

## 2021-04-06 NOTE — ED TRIAGE NOTES
Here for concern of small amount of vaginal bleeding associated with intermittent abdominal cramping. Is currently 11 weeks pregnant. ABCs intact.

## 2021-04-06 NOTE — ED PROVIDER NOTES
"  History   Chief Complaint:  Vaginal Bleeding       The history is provided by the patient and the spouse.      They were offered professional , though politely declined    Marie Matos is a 26 year old female,  currently 11 weeks pregnant, who presents with vaginal bleeding. The patient has vaginal bleeding and spotting that started yesterday. She called a nurses line after she passed a clot. She also has had some abdominal cramping and lower back discomfort. She has not seen OB yet for this pregnancy, and has not yet had an US.  She does have a remote history of miscarriage when she previously lived in [place]. She is otherwise healthy. She denies urinary symptoms including difficulty urinating, dysuria, or frequency.  No other concerns are voiced at this time.    Review of Systems   Gastrointestinal: Positive for abdominal pain.   Genitourinary: Positive for vaginal bleeding. Negative for difficulty urinating, dysuria and frequency.   All other systems reviewed and are negative.    Allergies:  The patient has no known allergies.     Medications:  Prenatal vitamins    Past Medical History:    Abnormal Pap smear  Fetal macrsomnia during pregnancy   Miscarriage     Past Surgical History:    Dilation and curettage x2     Social History:  The patient presents with her .  Has two children     Physical Exam     Patient Vitals for the past 24 hrs:   BP Temp Temp src Pulse Resp SpO2 Height Weight   21 2257 121/78 97.6  F (36.4  C) Oral 82 16 98 % 1.753 m (5' 9\") 99.8 kg (220 lb)       Physical Exam  General:              Well-nourished              Speaking in full sentences  Eyes:              Conjunctiva without injection or scleral icterus  ENT:              Moist mucous membranes              Nares patent              Pinnae normal  Neck:              Full ROM              No stiffness appreciated  Resp:              Lungs CTAB              No crackles, wheezing or audible rubs     "          Good air movement  CV:                    Normal rate, regular rhythm              S1 and S2 present              No murmur, gallop or rub  GI:              BS present              Abdomen soft without distention              Non-tender to light and deep palpation              No guarding or rebound tenderness  Skin:              Warm, dry, well perfused              No rashes or open wounds on exposed skin  MSK:              Moves all extremities              No focal deformities or swelling  Neuro:              Alert              Answers questions appropriately              Moves all extremities equally              Gait stable  Psych:              Normal affect, normal mood    Emergency Department Course     Imaging:  US OB 1st trimester w Transvaginal  IMPRESSION:   1.  Single living intrauterine gestation at 11 weeks and 3 days, EDC 10/23/2021.  2.  Tiny subchorionic hematoma.  Reading per radiology.      Laboratory:  CBC: WBC 10.8, HGB 12.7,      BMP: Urea nitrogen: 6 (L), Creatinine: 0.50 (L) o/w WNL      HCG quantitative pregnancy: 41,505 (H)     Emergency Department Course:    Reviewed:  I reviewed nursing notes, vitals and past medical history    Assessments:  0222 I obtained history and examined the patient as noted above.   0356 I rechecked the patient and explained findings.     Disposition:  The patient was discharged to home.       Impression & Plan     Medical Decision Making:  Marie Matos is a very pleasant 26-year-old  at approximately 11 weeks EGA, presenting to the ED for evaluation of vaginal bleeding.  VS on presentation unremarkable.  DDX includes threatened miscarriage, spontaneous AB, ectopic pregnancy, among others.  Work-up included ultrasound, and laboratory studies.  Current presentation most consistent with threatened miscarriage.  Ultrasound confirms IUP at 11W3D EGA, with positive fetal heart rate.  Note is also made of a tiny subchorionic hematoma, which  was discussed with the patient and her .  Patient is Rh+ and thus does not require RhoGam.  The degree of bleeding is described as quite minimal, and hemoglobin is presently normal.  Quantitative hCG returned at 41,505. Results and clinical impression were discussed with the patient.  I feel at this time this is unlikely to represent ectopic pregnancy given the presence of IUP.  I do feel she is stable for discharge from the ED with close outpatient follow-up.  I have recommended follow-up with OB/GYN later this week for reassessment.  We discussed pelvic rest, as well as avoidance of strenuous activities.  They did verbalize understanding of these recommendations.  Return to ED with worsened pain, vaginal bleeding, or any other new or troubling symptoms.  Patient and her  feel comfortable with this outlined plan of care.  All of their questions were answered prior to discharge.      Covid-19  Marie Matos was evaluated during a global COVID-19 pandemic, which necessitated consideration that the patient might be at risk for infection with the SARS-CoV-2 virus that causes COVID-19.   Applicable protocols for evaluation were followed during the patient's care.     Diagnosis:    ICD-10-CM    1. Threatened miscarriage in early pregnancy  O20.0 HCG quantitative pregnancy   2. Subchorionic hemorrhage of placenta in first trimester, single or unspecified fetus  O41.8X10     O46.8X1      Scribe Disclosure:  I, Shila Wu, am serving as a scribe at 2:16 AM on 4/6/2021 to document services personally performed by Miguel Ambrose MD based on my observations and the provider's statements to me.         Miguel Ambrose MD  04/06/21 0458       Miguel Ambrose MD  04/06/21 0458

## 2021-04-06 NOTE — DISCHARGE INSTRUCTIONS
Please rest, avoid strenuous activities, and do not put anything in or out of the vagina (including sexual intercourse), until seen in follow-up by OB/GYN.    Return to ER with worsening bleeding, cramping, fainting, or any other concerns.

## 2021-04-06 NOTE — TELEPHONE ENCOUNTER
Triage Call:   Spouse and patient calling stating she started to have some spotting today and it is now more mild bleeding. Passing white/gray tissue. States she is 11 weeks pregnant. Patient is also having 5/10 cramping, dizziness. Per protocol guidelines patient was advised to be seen in the ED. Spouse stated understanding and will bring the patient into the ED now.     COVID 19 Nurse Triage Plan/Patient Instructions    Please be aware that novel coronavirus (COVID-19) may be circulating in the community. If you develop symptoms such as fever, cough, or SOB or if you have concerns about the presence of another infection including coronavirus (COVID-19), please contact your health care provider or visit [email or web address].     Disposition/Instructions    ED Visit recommended. Follow protocol based instructions.     Bring Your Own Device:  Please also bring your smart device(s) (smart phones, tablets, laptops) and their charging cables for your personal use and to communicate with your care team during your visit.    Thank you for taking steps to prevent the spread of this virus.  o Limit your contact with others.  o Wear a simple mask to cover your cough.  o Wash your hands well and often.    Resources    [facility]: About COVID-19: [email or web address]    CDC: What to Do If You're Sick: www.cdc.gov/coronavirus/2019-ncov/about/steps-when-sick.html    CDC: Ending Home Isolation: www.cdc.gov/coronavirus/2019-ncov/hcp/disposition-in-home-patients.html     CDC: Caring for Someone: www.cdc.gov/coronavirus/2019-ncov/if-you-are-sick/care-for-someone.html     [facility]: Interim Guidance for Hospital Discharge to Home: www.health.[facility].[place].us/diseases/coronavirus/hcp/hospdischarge.pdf    [facility] clinical trials (COVID-19 research studies): [email or web address]/umn-[email or web address]-trials     Below are the COVID-19 hotlines at the [place] Department of Health ([facility]). Interpreters are  available.   o For health questions: Call 905-416-5196 or 1-313.170.3278 (7 a.m. to 7 p.m.)  o For questions about schools and childcare: Call 125-743-6004 or 1-235.825.5134 (7 a.m. to 7 p.m.)     Rena Mosley RN Nursing Advisor 4/5/2021 9:49 PM     Reason for Disposition    Passed tissue (e.g., gray-white)    Additional Information    Negative: Shock suspected (e.g., cold/pale/clammy skin, too weak to stand, low BP, rapid pulse)    Negative: Difficult to awaken or acting confused (e.g., disoriented, slurred speech)    Negative: Passed out (i.e., lost consciousness, collapsed and was not responding)    Negative: Sounds like a life-threatening emergency to the triager    Negative: [1] Vaginal bleeding AND [2] pregnant > 20 weeks    Negative: Not pregnant or pregnancy status unknown    Negative: SEVERE abdominal pain    Negative: [1] SEVERE vaginal bleeding (i.e., soaking 2 pads / hour, large blood clots) AND [2] present 2 or more hours    Negative: SEVERE dizziness (e.g., unable to stand, requires support to walk, feels like passing out)    Negative: [1] MODERATE vaginal bleeding (i.e., soaking 1 pad / hour; clots) AND [2] present > 6 hours    Negative: [1] MODERATE vaginal bleeding (i.e., soaking 1 pad / hour; clots) AND [2] pregnant > 12 weeks    Protocols used: PREGNANCY - VAGINAL BLEEDING LESS THAN 20 WEEKS [facility]

## 2021-04-13 ENCOUNTER — OFFICE VISIT (OUTPATIENT)
Dept: OBGYN | Facility: CLINIC | Age: 26
End: 2021-04-13
Payer: COMMERCIAL

## 2021-04-13 VITALS
SYSTOLIC BLOOD PRESSURE: 110 MMHG | DIASTOLIC BLOOD PRESSURE: 60 MMHG | BODY MASS INDEX: 33.33 KG/M2 | HEIGHT: 69 IN | WEIGHT: 225 LBS

## 2021-04-13 DIAGNOSIS — O99.891 BACK PAIN AFFECTING PREGNANCY IN SECOND TRIMESTER: ICD-10-CM

## 2021-04-13 DIAGNOSIS — O20.9 BLEEDING IN EARLY PREGNANCY: Primary | ICD-10-CM

## 2021-04-13 DIAGNOSIS — M54.9 BACK PAIN AFFECTING PREGNANCY IN SECOND TRIMESTER: ICD-10-CM

## 2021-04-13 PROCEDURE — 99213 OFFICE O/P EST LOW 20 MIN: CPT | Performed by: ADVANCED PRACTICE MIDWIFE

## 2021-04-13 ASSESSMENT — MIFFLIN-ST. JEOR: SCORE: 1824.97

## 2021-04-13 NOTE — PROGRESS NOTES
SUBJECTIVE:                                                   Marie Matos is a 26 year old who presents to clinic today for the following health issue(s):  Patient presents with:  ER F/U: ER 4/6/21 for vaginal bleeding and belly pain and back pain--bleeding has stopped but back and belly pain continues--food is not appetizing and tastes bad      HPI:  Marie presents for ER follow up.   She was seen in the ER on 4/6/21 due to vaginal bleeding in early pregnancy.   She underwent TVUS which showed:  IMPRESSION:   1.  Single living intrauterine gestation at 11 weeks and 3 days, EDC 10/23/2021.  2.  Tiny subchorionic hematoma.    EDC 10/23/2021 by US. Currently 12w3d ega.   She denies any further bleeding. She reports she does have low back and pelvic pain still.   Chart review reveals O positive blood type     No LMP recorded. (Menstrual status: Postpartum).  Last PHQ-9 score on record =   PHQ-9 SCORE 5/16/2018   PHQ-9 Total Score 1     Last GAD7 score on record = No flowsheet data found.      Problem list and histories reviewed & adjusted, as indicated.  Additional history: as documented.    Patient Active Problem List   Diagnosis     Supervision of normal pregnancy     Two vessel cord     Supervision of other high risk pregnancies, third trimester     Fetal macrosomia during pregnancy in third trimester     Indication for care in labor or delivery     Postpartum state     Past Surgical History:   Procedure Laterality Date     DILATION AND CURETTAGE  2012    In [place] - Missed AB     DILATION AND CURETTAGE SUCTION WITH ULTRASOUND GUIDANCE N/A 4/6/2018    Procedure: DILATION AND CURETTAGE SUCTION WITH ULTRASOUND GUIDANCE;  Dilation and Curettage Suction with Ultrasound Guidance  ;  Surgeon: Celine Monahan MD;  Location:  OR      Social History     Tobacco Use     Smoking status: Never Smoker     Smokeless tobacco: Never Used   Substance Use Topics     Alcohol use: No      Problem (# of Occurrences)  "Relation (Name,Age of Onset)    Cancer (1) Paternal Grandmother    Diabetes (1) Paternal Grandfather (50)    Family History Negative (2) Mother, Father            Current Outpatient Medications   Medication Sig     Prenatal Vit-Fe Fumarate-FA (PRENATAL MULTIVITAMIN PLUS IRON) 27-0.8 MG TABS per tablet Take 1 tablet by mouth daily     No current facility-administered medications for this visit.      No Known Allergies    ROS:  CONSTITUTIONAL: NEGATIVE for fever, chills, change in weight  INTEGUMENTARU/SKIN: NEGATIVE for worrisome rashes, moles or lesions  EYES: NEGATIVE for vision changes or irritation  ENT: NEGATIVE for ear, mouth and throat problems  RESP: NEGATIVE for significant cough or SOB  BREAST: NEGATIVE for masses, tenderness or discharge  CV: NEGATIVE for chest pain, palpitations or peripheral edema  GI: NEGATIVE for nausea, abdominal pain, heartburn, or change in bowel habits  : NEGATIVE for unusual urinary or vaginal symptoms. Positive for pelvic pain.   MUSCULOSKELETAL: positive for back pain  NEURO: NEGATIVE for weakness, dizziness or paresthesias  PSYCHIATRIC: NEGATIVE for changes in mood or affect    OBJECTIVE:     /60   Ht 1.753 m (5' 9\")   Wt 102.1 kg (225 lb)   BMI 33.23 kg/m    Body mass index is 33.23 kg/m .    PHYSICAL EXAM:  Constitutional:  Appearance: Well nourished, well developed alert, in no acute distress  Skin: General Inspection:  No rashes present, no lesions present, no areas of discoloration.  Neurologic:  Mental Status:  Oriented X3.  Normal strength and tone, sensory exam grossly normal, mentation intact and speech normal.    Psychiatric:  Mentation appears normal and affect normal/bright.  Pelvic exam deferred   Abdomen: nontender, FHTs auscultated easily at 155    In-Clinic Test Results:  No results found for this or any previous visit (from the past 24 hour(s)).    ASSESSMENT/PLAN:                                                      1. (O20.9) Bleeding in early " pregnancy  (primary encounter diagnosis)  - US on 4/6 showed viable IUP and subchorionic bleed  - Vaginal bleeding resolved  - FHTs auscultated   - Bleeding/SAB precautions reviewed     2. (O99.891,  M54.9) Back pain affecting pregnancy in second trimester  - Encouraged belly band and stretching, provided with AVS handout regarding back pain in pregnancy     Counseled to schedule initial OB visit within the next week    SADIE Edwards, [place]    Total time spent was 15 minutes; this includes pre-work time, intra-service time, and post-work time including time spent on documentation which occurred on the date of service.

## 2021-04-13 NOTE — PATIENT INSTRUCTIONS
Patient Education     Dolor de espalda en el embarazo: posición de chas y giro del tronco  Antes de hacer estos ejercicios, consulte a montes proveedor de atención médica para cerciorarse de que christy seguros para usted. Pregúntele cuántas veces al día debe hacer cada ejercicio.      Posición de chas Giro del tronco   Posición de chas  Мария ejercicio hace que los músculos de janiya muslos, pelvis y caderas se vuelvan más flexibles.  1. Siéntese en el piso con las plantas de los pies juntas. La espalda debe estar derecha.  2. Inclínese cuidadosamente hacia adelante hasta que sienta un leve estiramiento en los músculos de las caderas y los muslos. Mantenga la espalda derecha. No empuje las piernas hacia abajo con las lesa.  3. Mantenga esta posición y cuente hasta 5; luego, relájese.  Giro del tronco  Мария ejercicio ayuda a que montes tronco (desde los hombros hasta las caderas) se vuelva más flexible.  1. Siéntese en el piso con las piernas cruzadas. La espalda debe estar derecha.  2. Ponga montes mano izquierda sobre montes rodilla derecha. Coloque la mano derecha en el piso para apoyarse y mantener el equilibrio.  3. Gire lentamente hacia la derecha. Para hacerlo, voltee la hung, los hombros y el pecho lo más que pueda hacia la derecha sin sentir dolor. Mantenga las caderas, las rodillas y los pies en montes lugar.  4. Mantenga esta posición y cuente hasta 5. Luego, repita el ejercicio, girando esta vez lentamente hacia la izquierda.    [id number] [employer] Todos los derechos reservados. Esta información no pretende sustituir la atención médica profesional. Sólo montes médico puede diagnosticar y tratar un problema de evangelina.

## 2021-04-13 NOTE — NURSING NOTE
"Chief Complaint   Patient presents with     ER F/U     ER 21 for vaginal bleeding and belly pain and back pain--bleeding has stopped but back and belly pain continues--food is not appetizing and tastes bad       Initial /60   Ht 1.753 m (5' 9\")   Wt 102.1 kg (225 lb)   BMI 33.23 kg/m   Estimated body mass index is 33.23 kg/m  as calculated from the following:    Height as of this encounter: 1.753 m (5' 9\").    Weight as of this encounter: 102.1 kg (225 lb).  BP completed using cuff size: regular    Questioned patient about current smoking habits.  Pt. has never smoked.          The following HM Due: NONE         "

## 2021-04-21 ENCOUNTER — PRENATAL OFFICE VISIT (OUTPATIENT)
Dept: NURSING | Facility: CLINIC | Age: 26
End: 2021-04-21
Payer: COMMERCIAL

## 2021-04-21 DIAGNOSIS — Z34.80 PRENATAL CARE, SUBSEQUENT PREGNANCY, UNSPECIFIED TRIMESTER: Primary | ICD-10-CM

## 2021-04-21 PROCEDURE — 99207 PR NO CHARGE NURSE ONLY: CPT

## 2021-04-21 PROCEDURE — T1013 SIGN LANG/ORAL INTERPRETER: HCPCS | Mod: U4 | Performed by: INTERNAL MEDICINE

## 2021-04-21 SDOH — ECONOMIC STABILITY: TRANSPORTATION INSECURITY
IN THE PAST 12 MONTHS, HAS THE LACK OF TRANSPORTATION KEPT YOU FROM MEDICAL APPOINTMENTS OR FROM GETTING MEDICATIONS?: NOT ASKED

## 2021-04-21 SDOH — ECONOMIC STABILITY: FOOD INSECURITY: WITHIN THE PAST 12 MONTHS, THE FOOD YOU BOUGHT JUST DIDN'T LAST AND YOU DIDN'T HAVE MONEY TO GET MORE.: NOT ASKED

## 2021-04-21 SDOH — ECONOMIC STABILITY: TRANSPORTATION INSECURITY
IN THE PAST 12 MONTHS, HAS LACK OF TRANSPORTATION KEPT YOU FROM MEETINGS, WORK, OR FROM GETTING THINGS NEEDED FOR DAILY LIVING?: NOT ASKED

## 2021-04-21 SDOH — ECONOMIC STABILITY: INCOME INSECURITY: HOW HARD IS IT FOR YOU TO PAY FOR THE VERY BASICS LIKE FOOD, HOUSING, MEDICAL CARE, AND HEATING?: NOT ASKED

## 2021-04-21 SDOH — ECONOMIC STABILITY: FOOD INSECURITY: WITHIN THE PAST 12 MONTHS, YOU WORRIED THAT YOUR FOOD WOULD RUN OUT BEFORE YOU GOT MONEY TO BUY MORE.: NOT ASKED

## 2021-04-23 ENCOUNTER — PRENATAL OFFICE VISIT (OUTPATIENT)
Dept: OBGYN | Facility: CLINIC | Age: 26
End: 2021-04-23
Payer: COMMERCIAL

## 2021-04-23 VITALS — DIASTOLIC BLOOD PRESSURE: 56 MMHG | SYSTOLIC BLOOD PRESSURE: 110 MMHG | BODY MASS INDEX: 32.78 KG/M2 | WEIGHT: 222 LBS

## 2021-04-23 DIAGNOSIS — Z91.89 AT RISK FOR DIABETES MELLITUS: ICD-10-CM

## 2021-04-23 DIAGNOSIS — Z13.79 OTHER GENETIC SCREENING: ICD-10-CM

## 2021-04-23 DIAGNOSIS — Z34.80 PRENATAL CARE, SUBSEQUENT PREGNANCY, UNSPECIFIED TRIMESTER: ICD-10-CM

## 2021-04-23 DIAGNOSIS — O09.299 H/O MACROSOMIA IN INFANT IN PRIOR PREGNANCY, CURRENTLY PREGNANT: ICD-10-CM

## 2021-04-23 DIAGNOSIS — O09.91 HIGH-RISK PREGNANCY, FIRST TRIMESTER: ICD-10-CM

## 2021-04-23 DIAGNOSIS — Z12.4 PAP SMEAR FOR CERVICAL CANCER SCREENING: Primary | ICD-10-CM

## 2021-04-23 LAB
ABO + RH BLD: NORMAL
ABO + RH BLD: NORMAL
BLD GP AB SCN SERPL QL: NORMAL
BLOOD BANK CMNT PATIENT-IMP: NORMAL
HBA1C MFR BLD: 5.7 % (ref 0–5.6)
SPECIMEN EXP DATE BLD: NORMAL

## 2021-04-23 PROCEDURE — 87340 HEPATITIS B SURFACE AG IA: CPT | Performed by: ADVANCED PRACTICE MIDWIFE

## 2021-04-23 PROCEDURE — 86901 BLOOD TYPING SEROLOGIC RH(D): CPT | Performed by: ADVANCED PRACTICE MIDWIFE

## 2021-04-23 PROCEDURE — G0145 SCR C/V CYTO,THINLAYER,RESCR: HCPCS | Performed by: ADVANCED PRACTICE MIDWIFE

## 2021-04-23 PROCEDURE — 86762 RUBELLA ANTIBODY: CPT | Performed by: ADVANCED PRACTICE MIDWIFE

## 2021-04-23 PROCEDURE — 99000 SPECIMEN HANDLING OFFICE-LAB: CPT | Performed by: ADVANCED PRACTICE MIDWIFE

## 2021-04-23 PROCEDURE — 86780 TREPONEMA PALLIDUM: CPT | Mod: 90 | Performed by: ADVANCED PRACTICE MIDWIFE

## 2021-04-23 PROCEDURE — 86850 RBC ANTIBODY SCREEN: CPT | Performed by: ADVANCED PRACTICE MIDWIFE

## 2021-04-23 PROCEDURE — 87086 URINE CULTURE/COLONY COUNT: CPT | Performed by: ADVANCED PRACTICE MIDWIFE

## 2021-04-23 PROCEDURE — 99N1100 PR STATISTIC VERIFI PRENATAL TRISOMY 21,18,13: Mod: 90 | Performed by: ADVANCED PRACTICE MIDWIFE

## 2021-04-23 PROCEDURE — 36415 COLL VENOUS BLD VENIPUNCTURE: CPT | Performed by: ADVANCED PRACTICE MIDWIFE

## 2021-04-23 PROCEDURE — 83036 HEMOGLOBIN GLYCOSYLATED A1C: CPT | Performed by: ADVANCED PRACTICE MIDWIFE

## 2021-04-23 PROCEDURE — 99207 PR FIRST OB VISIT: CPT | Performed by: ADVANCED PRACTICE MIDWIFE

## 2021-04-23 PROCEDURE — 87389 HIV-1 AG W/HIV-1&-2 AB AG IA: CPT | Performed by: ADVANCED PRACTICE MIDWIFE

## 2021-04-23 PROCEDURE — 86900 BLOOD TYPING SEROLOGIC ABO: CPT | Performed by: ADVANCED PRACTICE MIDWIFE

## 2021-04-23 NOTE — NURSING NOTE
"Chief Complaint   Patient presents with     Prenatal Care     NPN 13w 6d no concerns, c/o nasuea and vomiting daily       Initial /56 (BP Location: Left arm, Cuff Size: Adult Large)   Wt 100.7 kg (222 lb)   LMP 2021   BMI 32.78 kg/m   Estimated body mass index is 32.78 kg/m  as calculated from the following:    Height as of 21: 1.753 m (5' 9\").    Weight as of this encounter: 100.7 kg (222 lb).  BP completed using cuff size: large    Questioned patient about current smoking habits.  Pt. has never smoked.          The following HM Due: pap smear  Joie Zelaya CMA    "

## 2021-04-24 ENCOUNTER — HEALTH MAINTENANCE LETTER (OUTPATIENT)
Age: 26
End: 2021-04-24

## 2021-04-24 LAB
BACTERIA SPEC CULT: NORMAL
Lab: NORMAL
SPECIMEN SOURCE: NORMAL
T PALLIDUM AB SER QL: NONREACTIVE

## 2021-04-26 PROBLEM — Z34.90 SUPERVISION OF NORMAL PREGNANCY: Status: RESOLVED | Noted: 2017-09-11 | Resolved: 2021-04-26

## 2021-04-26 PROBLEM — Z91.89 AT RISK FOR DIABETES MELLITUS: Status: ACTIVE | Noted: 2021-04-26

## 2021-04-26 PROBLEM — Z87.59 HISTORY OF POOR PREGNANCY OUTCOME: Status: ACTIVE | Noted: 2018-03-16

## 2021-04-26 PROBLEM — O09.91 HIGH-RISK PREGNANCY, FIRST TRIMESTER: Status: ACTIVE | Noted: 2021-04-26

## 2021-04-26 PROBLEM — O09.299 H/O MACROSOMIA IN INFANT IN PRIOR PREGNANCY, CURRENTLY PREGNANT: Status: ACTIVE | Noted: 2018-03-16

## 2021-04-26 PROBLEM — Q27.0 TWO VESSEL CORD: Status: RESOLVED | Noted: 2018-01-05 | Resolved: 2021-04-26

## 2021-04-26 PROBLEM — O09.893 SUPERVISION OF OTHER HIGH RISK PREGNANCIES, THIRD TRIMESTER: Status: RESOLVED | Noted: 2018-01-16 | Resolved: 2021-04-26

## 2021-04-26 LAB
HBV SURFACE AG SERPL QL IA: NONREACTIVE
HIV 1+2 AB+HIV1 P24 AG SERPL QL IA: NONREACTIVE
RUBV IGG SERPL IA-ACNC: 23 IU/ML

## 2021-04-26 NOTE — PROGRESS NOTES
Marie Matos is a 26 year old   woman,  who is not a previous CNM patient. She presents for a new OB Visit. This was a planned pregnancy.     FOB is Jared who is actively involved in relationship and this pregnancy.  Visit conducted with professional  by iPad-- time constraints d/t interpretation.    She has not had bleeding since her LMP.    She denies abdominal pain since her LMP.  She has had nausea.  has had vomiting.  Any personal or family history of blood clots? No  History of sickle cell anemia or trait? No         Patient's last menstrual period was 2021..  Estimated Date of Delivery: Oct 23, 2021 Ultrasound consistent with LMP.        Current medications are:    Current Outpatient Medications:      Prenatal Vit-Fe Fumarate-FA (PRENATAL MULTIVITAMIN PLUS IRON) 27-0.8 MG TABS per tablet, Take 1 tablet by mouth daily, Disp: 100 tablet, Rfl: 3       INFECTION HISTORY  HIV: No  Hepatitis B: No  Hepatitis C: No  Tuberculosis: No  Genital Herpes self: no  Herpes partner:  no  Chlamydia:  no  Gonorrhea:  no  Syphilis:  No      OB HISTORY  OB History    Para Term  AB Living   4 2 2 0 1 2   SAB TAB Ectopic Multiple Live Births   1 0 0 0 2      # Outcome Date GA Lbr Humphrey/2nd Weight Sex Delivery Anes PTL Lv   4 Current            3 Term 18 39w4d 02:50 / 00:02 3.93 kg (8 lb 10.6 oz) F Vag-Spont None N STEPHANIE      Name: Netta      Apgar1: 8  Apgar5: 9   2 Term 01/21/15 39w6d 07:35 / 00:27 4.394 kg (9 lb 11 oz) M Vag-Spont None  STEPHANIE      Birth Comments: Breast fed and bottle fed with breast milk      Name: Renny      Apgar1: 9  Apgar5: 9   1 SAB /10/12     AB, MISSED          History of GDM: No,  PTL : No,  History of HTN in pregnancy: No,  Thrombocytopenia: No,  Shoulder dystocia: No,  Vacuum Extraction: No  PPH: No   3rd of 4th degree laceration: No.   Other complications: Yes - Macrosomia, 2VC    PERSONAL HISTORY  Hgb A1c screen:  BMI > 30: Yes,  "History of GDM: No, PCOS: No, High risk ethnicity: Yes    Social History     Socioeconomic History     Marital status:      Spouse name: Not on file     Number of children: 1     Years of education: Not on file     Highest education level: Not on file   Occupational History     Occupation: not working     Comment: 4/21/21   Social Needs     Financial resource strain: Not on file     Food insecurity     Worry: Not on file     Inability: Not on file     Transportation needs     Medical: Not on file     Non-medical: Not on file   Tobacco Use     Smoking status: Never Smoker     Smokeless tobacco: Never Used   Substance and Sexual Activity     Alcohol use: No     Drug use: No     Sexual activity: Yes     Partners: Male   Lifestyle     Physical activity     Days per week: Not on file     Minutes per session: Not on file     Stress: Not on file   Relationships     Social connections     Talks on phone: Not on file     Gets together: Not on file     Attends [employer] service: Not on file     Active member of club or organization: Not on file     Attends meetings of clubs or organizations: Not on file     Relationship status: Not on file     Intimate partner violence     Fear of current or ex partner: Not on file     Emotionally abused: Not on file     Physically abused: Not on file     Forced sexual activity: Not on file   Other Topics Concern     Not on file   Social History Narrative     Not on file         She  reports that she has never smoked. She has never used smokeless tobacco.    STD testing offered?  Declined  Last PHQ-9 score on record =   PHQ-9 SCORE 5/16/2018   PHQ-9 Total Score 1     EPDS 6, \"never\" to question 10    PAST MEDICAL/SURGICAL HISTORY  Past Medical History:   Diagnosis Date     Abnormal Pap smear      Miscarriage 2012     Past Surgical History:   Procedure Laterality Date     DILATION AND CURETTAGE  2012    In [place] - Missed AB     DILATION AND CURETTAGE SUCTION WITH ULTRASOUND GUIDANCE " N/A 4/6/2018    Procedure: DILATION AND CURETTAGE SUCTION WITH ULTRASOUND GUIDANCE;  Dilation and Curettage Suction with Ultrasound Guidance  ;  Surgeon: Celine Monahan MD;  Location: [facility] OR       FAMILY HISTORY  Family History   Problem Relation Age of Onset     Family History Negative Mother      Family History Negative Father      Diabetes Paternal Grandfather 50     Cancer Paternal Grandmother          ROS:  12 point review of systems otherwise negative    PHYSICAL EXAM  Vitals: /56 (BP Location: Left arm, Cuff Size: Adult Large)   Wt 100.7 kg (222 lb)   LMP 01/16/2021   BMI 32.78 kg/m    BMI= Body mass index is 32.78 kg/m .     GENERAL:  26 year old pleasant pregnant female, alert, cooperative and well groomed.  NECK:  Thyroid without enlargement and nodules.  Lymph nodes not palpable.   LUNGS:  Clear to auscultation.  BREAST:  No concerns, exam deferred  HEART:  RRR without murmur.  ABDOMEN: Soft without masses or tenderness.   GENITALIA: BUS without tenderness or inflammation.  Perineum without lesions.    VAGINA:  Pink, normal rugae and discharge.  CERVIX:  Closed to visual exam. Pap collected.  LOWER EXTREMITIES: No edema. No significant varicosities.    ASSESSMENT/PLAN:    IUP at 14w2d    ICD-10-CM    1. Pap smear for cervical cancer screening  Z12.4 Pap imaged thin layer screen reflex to HPV if ASCUS - recommend age 25 - 29   2. Prenatal care, subsequent pregnancy, unspecified trimester  Z34.80 Urine Culture Aerobic Bacterial     Treponema Abs w Reflex to RPR and Titer     Rubella Antibody IgG Quantitative     HIV Antigen Antibody Combo     Hepatitis B surface antigen     ABO/Rh type and screen     Pap imaged thin layer screen reflex to HPV if ASCUS - recommend age 25 - 29     Hemoglobin A1c     Non Invasive Prenatal Test Cell Free DNA     CANCELED: NEISSERIA GONORRHOEA PCR     CANCELED: CHLAMYDIA TRACHOMATIS PCR   3. Other genetic screening  Z13.79 Non Invasive Prenatal Test Cell  Free DNA        consult for US for AMA patients: NA  Genetic Testing reviewed and discussed, patient desires NIPT. Handout provided. Consent signed.    COUNSELING    Instructed on use of triage nurse line and contacting the on call CNM after hours in an emergency.     Symptoms of N&V and fatigue usually start to resolve around 12-16 weeks     Reviewed CNM philosophy, call schedule for labor and delivery, and FR for delivery    1st OB handout given outlining appointment spacing and CNM information    Reviewed exercise and nutrition    Recommend to gain 11-20 pounds with her pregnancy.    Discussed OTC medications. OB med list given    Encouraged patient to arrange  if needed    Encouraged patient to take PNV's/DHA    Travel precautions discussed, no air travel after 36 weeks and Zika Virus discussed    Will call patient with lab results when available    -Reviewed unisom & B6 for nausea & vomitting  -labs pending, will call with  when all labs returned    F/U to be addressed next visit:  -Review criteria for preeclampsia prevention  -Assess personal history (safety at home, diet / exercise, breastfeeding)    Will return to the clinic in 4 weeks for her next routine prenatal check.  Will call to be seen sooner if problems arise.    SADIE Gillespie, CNM

## 2021-04-27 ENCOUNTER — TELEPHONE (OUTPATIENT)
Dept: OBGYN | Facility: CLINIC | Age: 26
End: 2021-04-27

## 2021-04-27 ENCOUNTER — APPOINTMENT (OUTPATIENT)
Dept: INTERPRETER SERVICES | Facility: CLINIC | Age: 26
End: 2021-04-27
Payer: COMMERCIAL

## 2021-04-27 LAB — LAB SCANNED RESULT: NORMAL

## 2021-04-27 NOTE — TELEPHONE ENCOUNTER
Results received from [facility] testing in [facility].  Testing done:  Innatal Prenatal Screen    Action:  LM via  for pt to cb to report NORMAL results.    Gender:**BOY**  Will ask pt if they wish to know the gender.    Please route to provider as FYI once pt is informed.    Yudi MCADAMS RN

## 2021-04-28 ENCOUNTER — APPOINTMENT (OUTPATIENT)
Dept: INTERPRETER SERVICES | Facility: CLINIC | Age: 26
End: 2021-04-28
Payer: COMMERCIAL

## 2021-04-28 LAB
COPATH REPORT: NORMAL
PAP: NORMAL

## 2021-05-21 ENCOUNTER — PRENATAL OFFICE VISIT (OUTPATIENT)
Dept: OBGYN | Facility: CLINIC | Age: 26
End: 2021-05-21
Payer: COMMERCIAL

## 2021-05-21 VITALS — DIASTOLIC BLOOD PRESSURE: 68 MMHG | WEIGHT: 223 LBS | SYSTOLIC BLOOD PRESSURE: 112 MMHG | BODY MASS INDEX: 32.93 KG/M2

## 2021-05-21 DIAGNOSIS — Z34.82 PRENATAL CARE, SUBSEQUENT PREGNANCY IN SECOND TRIMESTER: Primary | ICD-10-CM

## 2021-05-21 PROCEDURE — 99207 PR PRENATAL VISIT: CPT | Performed by: ADVANCED PRACTICE MIDWIFE

## 2021-05-21 NOTE — NURSING NOTE
"Chief Complaint   Patient presents with     Prenatal Care     17 weeks and 6 days       Initial /68 (BP Location: Left arm, Cuff Size: Adult Regular)   Wt 101.2 kg (223 lb)   LMP 2021   Breastfeeding No   BMI 32.93 kg/m   Estimated body mass index is 32.93 kg/m  as calculated from the following:    Height as of 21: 1.753 m (5' 9\").    Weight as of this encounter: 101.2 kg (223 lb).  BP completed using cuff size: regular    Questioned patient about current smoking habits.  Pt. has never smoked.                       "

## 2021-05-21 NOTE — PROGRESS NOTES
S: Feels well. Has not had LOF, vaginal bleeding, or uncomfortable uterine activity.    O:  /68 (BP Location: Left arm, Cuff Size: Adult Regular)   Wt 101.2 kg (223 lb)   LMP 01/16/2021   Breastfeeding No   BMI 32.93 kg/m    Exam:  Constitutional: Healthy, alert and no distress  Respiratory: Respirations even and unlabored  Gastrointestinal: Abdomen soft, non-tender. Fundus measures appropriately for gestational age. Fetal heart tones heard easily.  Psychiatric: Mentation appears normal and affect normal/bright    A/P: (Z34.82) Prenatal care, subsequent pregnancy in second trimester  (primary encounter diagnosis)  Plan: US OB > 14 Weeks  - Reviewed criteria for preeclampsia prevention. She has no high risk factors and no moderate risk factors other than BMI >30  - Reviewed borderline A1c 5.7, indicating prediabetes. Consider early 1hr GCT, to be discussed at next visit.  - TWG 1 lb. Discussed recommended weight gain of 11-20 lbs, expect most gain in the third trimester, encouraged half an hour of physical activity most days a week. Reviewed healthy eating habits. Small, frequent nutrient-dense meals encouraged to maintain blood sugar.  - Patient declines laboratory genetic screening. Plan anatomy scan between 20-22 weeks.  - Return to clinic in 4 weeks.    Erica Braun, KATELYN, APRN, CNM

## 2021-06-07 ENCOUNTER — PATIENT OUTREACH (OUTPATIENT)
Dept: OBGYN | Facility: CLINIC | Age: 26
End: 2021-06-07

## 2021-06-11 ENCOUNTER — PRENATAL OFFICE VISIT (OUTPATIENT)
Dept: OBGYN | Facility: CLINIC | Age: 26
End: 2021-06-11
Attending: ADVANCED PRACTICE MIDWIFE
Payer: COMMERCIAL

## 2021-06-11 ENCOUNTER — ANCILLARY PROCEDURE (OUTPATIENT)
Dept: ULTRASOUND IMAGING | Facility: CLINIC | Age: 26
End: 2021-06-11
Attending: ADVANCED PRACTICE MIDWIFE
Payer: COMMERCIAL

## 2021-06-11 VITALS — WEIGHT: 226 LBS | DIASTOLIC BLOOD PRESSURE: 62 MMHG | BODY MASS INDEX: 33.37 KG/M2 | SYSTOLIC BLOOD PRESSURE: 100 MMHG

## 2021-06-11 DIAGNOSIS — Z34.82 PRENATAL CARE, SUBSEQUENT PREGNANCY IN SECOND TRIMESTER: Primary | ICD-10-CM

## 2021-06-11 DIAGNOSIS — O09.299 H/O MACROSOMIA IN INFANT IN PRIOR PREGNANCY, CURRENTLY PREGNANT: ICD-10-CM

## 2021-06-11 DIAGNOSIS — O09.92 HIGH-RISK PREGNANCY, SECOND TRIMESTER: ICD-10-CM

## 2021-06-11 DIAGNOSIS — Z34.82 PRENATAL CARE, SUBSEQUENT PREGNANCY IN SECOND TRIMESTER: ICD-10-CM

## 2021-06-11 PROCEDURE — 76805 OB US >/= 14 WKS SNGL FETUS: CPT | Performed by: OBSTETRICS & GYNECOLOGY

## 2021-06-11 PROCEDURE — 99207 PR PRENATAL VISIT: CPT | Performed by: ADVANCED PRACTICE MIDWIFE

## 2021-06-11 NOTE — PATIENT INSTRUCTIONS
Patient Education      Qué es la diabetes gestacional?  La diabetes gestacional es un tipo especial de diabetes que ocurre solamente mickie el embarazo. Normalmente, mickie la digestión, los alimentos se convierten en azúcar (glucosa) que luego pasa al torrente sanguíneo. El cuerpo produce edis sustancia denominada insulina que ayuda a las células a usar el azúcar presente en la dylon para generar energía. Los cambios que se producen en el cuerpo mickie el embarazo pueden hacer que el nivel de azúcar en la dylon se eleve demasiado. [place] puede ser peligroso tanto para usted nan para montes bebé. Puede shon medidas para controlar el nivel de azúcar en la dylon y disminuir estos riesgos.   El manejo de la diabetes gestacional     Para manejar la diabetes gestacional, deberá hacer controles del nivel de azúcar en la dylon mickie el embarazo. Montes equipo de atención médica la ayudará a elaborar un plan para lograrlo. Мария plan incluirá lo siguiente:     Alimentarse ann. [place] los alimentos adecuados es la mejor manera de controlar el azúcar en la dylon. Es importante que coma todos los días edis variedad de alimentos de los distintos grupos alimenticios. [place] ayuda para hacer los cambios necesarios en montes alimentación, es probable que consulte a un especialista en nutrición. Es decir, un experto en alimentos y nutrición. El nutricionista puede ayudarla a entender cómo ciertos alimentos afectan montes nivel de azúcar en la dylon. También puede enseñarle las técnicas necesarias para planificar comidas saludables y equilibradas.    Hacer ejercicio. Cuando hace ejercicio, el cuerpo usa más azúcar de la dylon. Montes proveedor de atención médica le puede recomendar el mejor tipo de ejercicio para usted y el mejor momento para hacerlo.    Medirse el nivel de azúcar en la dylon. Lo más probable es que usted se controle el nivel de azúcar en la dylon 2 o más veces al día, en montes casa. Montes equipo de atención médica le enseñará cómo  hacerlo. También le explicará qué valores de azúcar en la dylon debe mantener. Montes nivel de azúcar en la dylon también puede analizarse, aproximadamente, cada semana en el laboratorio. Es posible que necesite shon insulina mickie el embarazo si el nivel de azúcar en dylon sigue siendo alto.  Riesgos para el bebé  Si usted no controla el azúcar en la dylon, es más probable que montes bebé tenga los siguientes problemas:     El bebé podría crecer demasiado.  Si montes nivel de azúcar en la dylon permanece demasiado elevado, montes bebé podría tener un tamaño demasiado erik (macrosomía) nan para afrontar un parto vaginal. La distocia de hombros es edis de las complicaciones que podrían suceder mickie el parto. [place] se produce cuando los hombros del bebé quedan atrapados detrás del hueso púbico de la mamá. Si esto ocurre, los brazos y hombros del bebé podrían sufrir lesiones. [place] puede ocasionar un daño permanente en los brazos. El bebé también puede tener niveles bajos de oxígeno (hipoxia) y acidemia si la distocia no se puede corregir. La hipoxia puede producir parálisis cerebral. Con poca frecuencia, puede llevar a la muerte.    Los órganos del bebé podrían no estar completamente desarrollados antes del nacimiento.  Si usted tiene diabetes, es probable que deba delilah a jacinta a montes bebé antes. [place] puede deberse a problemas con el embarazo. O a posibles riesgos para usted o montes bebé. Si el bebé nace antes de término, es posible que los pulmones no funcionen ann. [place] se llama síndrome de dificultad respiratoria (SDR). También es posible que al bebé no le funcione ann el hígado, en cuyo bebeto jayesh podría tener color amarillento de la piel y los ojos (ictericia) después del nacimiento.    Es posible que el nivel de azúcar en la dylon del bebé sea bajo después del nacimiento.  Si usted tiene un nivel de azúcar demasiado elevado, montes bebé produce un exceso de insulina. El bebé seguirá produciendo insulina en exceso  inmediatamente después de nacer. En adriana bebeto, podría ser necesario tratarlo por un nivel bajo de azúcar en la dylon.    El bebé podría nacer muerto. [place] es muy infrecuente, serina montes bebé podría morir antes del parto si usted tiene el nivel de azúcar elevado por demasiado tiempo.  Riesgos para usted  Si usted no se controla el nivel de azúcar en la dylon, es más probable que tenga los siguientes problemas:     Podría tener presión arterial pallavi.  Tener un nivel elevado de azúcar en la dylon aumenta la probabilidad de presión arterial pallavi mickie el embarazo (preeclampsia). [place] es peligroso para montes evangelina y podría traer nan consecuencia que montes bebé nazca prematuramente.    Podría tener más infecciones. Tener un alto nivel de azúcar en la dylon aumenta la probabilidad de contraer infecciones de la vejiga, los riñones y la vagina.    Dificultades para respirar. Podría sentir molestias o falta de aire. Tener un alto nivel de azúcar en la dylon puede provocar la acumulación de líquido alrededor del bebé (polihidramnios). El abdomen de la madre se agranda y le presiona los pulmones.    Parto prolongado. [place] vez tenga un parto más difícil y tarde más en recuperarse. Si montes nivel de azúcar en la dylon permanece demasiado elevado, es posible que montes bebé crezca excesivamente. Un bebé de gran tamaño puede provocarle lesiones mickie el parto. Es posible que tengan que realizarle edis cesárea. Se trata de edis cirugía en la cual se efectúa un radha (incisión) en el abdomen y el útero. Necesitar edis cesárea es kristen de los riesgos más frecuentes de la diabetes gestacional.  Reduzca montes riesgo de tener diabetes tipo 2 en el futuro   Las mujeres que tienen diabetes gestacional corren un riesgo más elevado de desarrollar diabetes tipo 2. Para reducir el riesgo, baje de peso si tiene sobrepeso. [place]ak tanta actividad física nan pueda. Ingiera más frutas y verduras y menos cantidad de alimentos procesados. Y pídale a montes proveedor de  atención médica que le angela los controles de la diabetes regularmente. También tendrá un mayor riesgo de tener diabetes gestacional con montes próximo embarazo.    Quién puede tener diabetes gestacional?  La diabetes gestacional es más probable en las mujeres que:     Tienen sobrepeso.    Tienen antecedentes familiares de diabetes.    [place] tenido un bebé que pesó más de 9 libras al nacer.    [place] tenido un bebé que murió antes del parto.    [place] tenido diabetes gestacional en janiya embarazos anteriores.    Son latinas, afroamericanas, nativas de [place], del tanya o del jayesh asiático o de las [place].    [id number] [employer]. Todos los derechos reservados. Esta información no pretende sustituir la atención médica profesional. Sólo montes médico puede diagnosticar y tratar un problema de evangelina.    Patient Education     Qué es el trabajo de parto prematuro  El trabajo de parto que comienza antes de la semana 37 de gestación se llama trabajo de parto prematuro. El trabajo de parto prematuro puede delilah lugar a un nacimiento prematuro. [place] consecuencia, esto puede provocar diversos problemas en la evangelina de montes bebé.      Antes del trabajo de parto, el chucho uterino está espeso y cerrado.      Jose el trabajo de parto prematuro, el chucho uterino comienza a perder espesor y a dilatarse (abrirse).   Síntomas del trabajo de parto prematuro   Si richie que está teniendo síntomas de trabajo de parto prematuro, busque ayuda médica de inmediato. Recuerde que las contracciones por sí solas no significan necesariamente que esté teniendo un trabajo de parto prematuro. Lo más significativo son los cambios en el chucho uterino (el extremo inferior del útero). Los síntomas del trabajo de parto prematuro son los siguientes, entre otros:     Cuatro o más contracciones por hora    Contracciones denisa    Cólicos constantes del mismo tipo que los cólicos menstruales    Dolor en la parte baja de la  espalda    Secreción vaginal sanguinolenta o con mucosidad    Sangrado o manchas en el keyon o tercer trimestre de embarazo  Evaluación de los síntomas de parto prematuro   Montes proveedor de atención médica tratará de determinar si realmente tiene trabajo de parto prematuro o si se trata simplemente de contracciones. El profesional la controlará mickie algunas horas. Podrían hacerle las siguientes pruebas:     Examen pélvico para determinar si el chucho uterino ha perdido espesor o se ha dilatado (abierto)    Monitoreo de la actividad uterina para detectar si hay contracciones    Monitoreo del feto para comprobar la evangelina del bebé    Ecografía (ultrasonido) para determinar el tamaño y la posición del bebé    Amniocentesis para determinar el rukhsana de madurez de los pulmones de montes bebé  Cómo cuidarse en montes casa  Si tiene contracciones prematuras, serina el chucho uterino todavía está espeso y cerrado, es posible que montes proveedor de atención médica le pida que angela lo siguiente en montes casa:     Beber abundante agua.    Reducir montes nivel de actividad.    Descansar en la cama, sobre el costado.    Evitar las relaciones sexuales y la estimulación de los pezones.  Cuándo llamar a montes proveedor de atención médica  Llame a montes proveedor de atención médica si nota alguno de estos síntomas:     Cuatro o más contracciones por hora    Rompimiento de ruben    Sangrado o manchas de dylon  Si necesita atención en el hospital   El trabajo de parto prematuro suele requerir atención en el hospital y reposo total en la cama. Es posible que se le administren líquidos a través de edis vía intravenosa (i.v.). [place] vez le administren píldoras o edis inyección para prevenir las contracciones. Por último, es posible que reciba medicamentos (corticoesteroides) para favorecer un desarrollo más rápido de los pulmones del bebé.    Usted corre riesgos?  El trabajo de parto prematuro puede afectar a cualquier abeba. Puede comenzar sin ningún motivo  aparente. No obstante, los siguientes factores de riesgo aumentan las probabilidades de que se produzca:     Tiene antecedentes de trabajo de parto prematuro o nacimientos prematuros    Ha fumado o tomado drogas o alcohol mickie el embarazo    Fetos múltiples (mellizos o más)    Problemas con la forma del útero    Sangrado mickie el embarazo  Los peligros del nacimiento prematuro   Un bebé que nace demasiado pronto puede tener problemas de evangelina. [place] se debe a que no ha tenido tiempo suficiente para desarrollarse. Algunos de los riesgos para el bebé incluyen:     No amamantarse o alimentarse ann    Pulmones inmaduros    Hemorragias cerebrales    Muerte  Cómo llegar al término  Montes objetivo es llegar lo más cerca posible del término del embarazo antes de delilah a jacinta. Cuanto más cerca llegue, habrá más probabilidades de tener un bebé fatou. Trabaje en colaboración con montes proveedor de atención médica. Juntos, podrán shon las medidas necesarias para evitar el nacimiento prematuro del bebé.     [id number] [employer]. Todos los derechos reservados. Esta información no pretende sustituir la atención médica profesional. Sólo montes médico puede diagnosticar y tratar un problema de evangelina.

## 2021-06-11 NOTE — NURSING NOTE
"Chief Complaint   Patient presents with     Prenatal Care     20 weeks and 6 days     Women's Health     Per patient, no previous Pap done prior to 2017       Initial /62 (BP Location: Left arm, Cuff Size: Adult Regular)   Wt 102.5 kg (226 lb)   LMP 2021   BMI 33.37 kg/m   Estimated body mass index is 33.37 kg/m  as calculated from the following:    Height as of 21: 1.753 m (5' 9\").    Weight as of this encounter: 102.5 kg (226 lb).  BP completed using cuff size: regular    Questioned patient about current smoking habits.  Pt. has never smoked.          The following HM Due: NONE    "

## 2021-06-11 NOTE — PROGRESS NOTES
Sister in law present for visit and acting as . Declined professional interpretor.   S: Feels well. Had ultrasound today.   Has started feeling fetal movement. Started about month ago.  Denies uterine cramping, vaginal bleeding or leaking of fluid. Is having some N&V. Denies need for medications.  Ultrasound today. Final report is not back but per tech the heart was not well seen.    Reviewed both L&D delivery records. No dystocia or VAVD. No BP issues or bleeding issues noted.   O: Vitals: /62 (BP Location: Left arm, Cuff Size: Adult Regular)   Wt 102.5 kg (226 lb)   LMP 01/16/2021   BMI 33.37 kg/m    BMI= Body mass index is 33.37 kg/m .  Exam:  Constitutional: healthy, alert and no distress  Respiratory: respirations even and unlabored  Gastrointestinal: Abdomen soft, non-tender. Fundus measures appropriate for gestational age. Fetal heart tones hear without difficulty and within normal limits  : Deferred  Psychiatric: mentation appears normal and affect normal/bright  A: Supervision of high risk pregnancy   P: Discussed 20 week fetal screen. Offered MFM or repeat. Will plan repeat at next visit.   Encouraged patient to call with any questions or concerns.  Cristin Ivy CNM

## 2021-06-14 ENCOUNTER — APPOINTMENT (OUTPATIENT)
Dept: INTERPRETER SERVICES | Facility: CLINIC | Age: 26
End: 2021-06-14
Payer: COMMERCIAL

## 2021-07-09 ENCOUNTER — PRENATAL OFFICE VISIT (OUTPATIENT)
Dept: OBGYN | Facility: CLINIC | Age: 26
End: 2021-07-09
Payer: COMMERCIAL

## 2021-07-09 VITALS
DIASTOLIC BLOOD PRESSURE: 60 MMHG | HEIGHT: 67 IN | SYSTOLIC BLOOD PRESSURE: 98 MMHG | BODY MASS INDEX: 35.47 KG/M2 | WEIGHT: 226 LBS

## 2021-07-09 DIAGNOSIS — O09.91 HIGH-RISK PREGNANCY, FIRST TRIMESTER: ICD-10-CM

## 2021-07-09 PROCEDURE — 99207 PR PRENATAL VISIT: CPT | Performed by: ADVANCED PRACTICE MIDWIFE

## 2021-07-09 ASSESSMENT — MIFFLIN-ST. JEOR: SCORE: 1797.76

## 2021-07-09 NOTE — NURSING NOTE
"Chief Complaint   Patient presents with     Prenatal Care     24w 6d no concerns       Initial BP 98/60 (BP Location: Left arm, Cuff Size: Adult Regular)   Ht 1.702 m (5' 7\")   Wt 102.5 kg (226 lb)   LMP 2021   BMI 35.40 kg/m   Estimated body mass index is 35.4 kg/m  as calculated from the following:    Height as of this encounter: 1.702 m (5' 7\").    Weight as of this encounter: 102.5 kg (226 lb).  BP completed using cuff size: regular    Questioned patient about current smoking habits.  Pt. has never smoked.          The following HM Due: NONE    "

## 2021-07-12 NOTE — PROGRESS NOTES
Marie Matos presents to clinic alone at 24w6d for a routine prenatal appointment. Visit conducted with professional  by iPad. She reports she is feeling well and has no concerns. Normal fetal movement. Denies bleeding, LOF, or cramping.    Reviewed total weight gain of 1.814 kg (4 lb). Slightly below range for expected total weight gain of 5 kg (11 lb)-9 kg (19 lb) with no interval gain-- continue to monitor.    S>D: measuring 3 cm ahead this week. Continue to monitor.    Incomplete views of fetal anatomy: repeat scheduled for 7/30    Elevated A1C: unable to stay for 1 hour glucose today. Accepts at [facility].    COVID-19 Mitigation: Reviewed Marie's risk factors, rise of delta variant in [place]. Strongly encouraged her to consider vaccination. She will consider-- reviewed how to schedule.  Pregnancy checklist updated. Reviewed glucose, CBC, rpr & tdap at next visit-- accepts all. Warning signs reviewed. RTC in 3 weeks, sooner if problems.

## 2021-07-30 ENCOUNTER — ANCILLARY PROCEDURE (OUTPATIENT)
Dept: ULTRASOUND IMAGING | Facility: CLINIC | Age: 26
End: 2021-07-30
Attending: ADVANCED PRACTICE MIDWIFE
Payer: COMMERCIAL

## 2021-07-30 PROCEDURE — 76816 OB US FOLLOW-UP PER FETUS: CPT | Performed by: OBSTETRICS & GYNECOLOGY

## 2021-08-01 DIAGNOSIS — O28.3 ABNORMAL FETAL ULTRASOUND: Primary | ICD-10-CM

## 2021-08-02 ENCOUNTER — APPOINTMENT (OUTPATIENT)
Dept: INTERPRETER SERVICES | Facility: CLINIC | Age: 26
End: 2021-08-02
Payer: COMMERCIAL

## 2021-08-02 ENCOUNTER — TRANSCRIBE ORDERS (OUTPATIENT)
Dept: MATERNAL FETAL MEDICINE | Facility: CLINIC | Age: 26
End: 2021-08-02

## 2021-08-02 DIAGNOSIS — O26.90 PREGNANCY RELATED CONDITION, ANTEPARTUM: Primary | ICD-10-CM

## 2021-08-03 ENCOUNTER — HOSPITAL ENCOUNTER (OUTPATIENT)
Dept: ULTRASOUND IMAGING | Facility: CLINIC | Age: 26
End: 2021-08-03
Attending: ADVANCED PRACTICE MIDWIFE
Payer: COMMERCIAL

## 2021-08-03 ENCOUNTER — PRE VISIT (OUTPATIENT)
Dept: MATERNAL FETAL MEDICINE | Facility: CLINIC | Age: 26
End: 2021-08-03

## 2021-08-03 ENCOUNTER — OFFICE VISIT (OUTPATIENT)
Dept: MATERNAL FETAL MEDICINE | Facility: CLINIC | Age: 26
End: 2021-08-03
Attending: ADVANCED PRACTICE MIDWIFE
Payer: COMMERCIAL

## 2021-08-03 VITALS — DIASTOLIC BLOOD PRESSURE: 60 MMHG | SYSTOLIC BLOOD PRESSURE: 104 MMHG

## 2021-08-03 DIAGNOSIS — O36.63X0 EXCESSIVE FETAL GROWTH AFFECTING MANAGEMENT OF PREGNANCY IN THIRD TRIMESTER, SINGLE OR UNSPECIFIED FETUS: Primary | ICD-10-CM

## 2021-08-03 DIAGNOSIS — O26.90 PREGNANCY RELATED CONDITION, ANTEPARTUM: ICD-10-CM

## 2021-08-03 DIAGNOSIS — O36.63X0 EXCESSIVE FETAL GROWTH AFFECTING MANAGEMENT OF PREGNANCY IN THIRD TRIMESTER, SINGLE OR UNSPECIFIED FETUS: ICD-10-CM

## 2021-08-03 DIAGNOSIS — O40.3XX0 POLYHYDRAMNIOS IN THIRD TRIMESTER COMPLICATION, SINGLE OR UNSPECIFIED FETUS: Primary | ICD-10-CM

## 2021-08-03 PROCEDURE — 76811 OB US DETAILED SNGL FETUS: CPT | Mod: 26 | Performed by: OBSTETRICS & GYNECOLOGY

## 2021-08-03 PROCEDURE — 76811 OB US DETAILED SNGL FETUS: CPT

## 2021-08-03 PROCEDURE — 99212 OFFICE O/P EST SF 10 MIN: CPT | Mod: 25 | Performed by: OBSTETRICS & GYNECOLOGY

## 2021-08-03 NOTE — PROGRESS NOTES
Marie Matos was seen for an ultrasound today at the Maternal-Fetal Medicine center.      For the details of the ultrasound please see the report which can be found under the imaging tab.      Comfort Chaves MD  , OB/GYN  Maternal-Fetal Medicine  [email or web address]  [phone] (Main MFM Office)  [phone] or [phone] (for 24 hour MFM questions)  [phone] (Pager)

## 2021-08-06 PROBLEM — O40.9XX0 POLYHYDRAMNIOS AFFECTING PREGNANCY: Status: ACTIVE | Noted: 2021-08-06

## 2021-08-25 ENCOUNTER — OFFICE VISIT (OUTPATIENT)
Dept: MATERNAL FETAL MEDICINE | Facility: CLINIC | Age: 26
End: 2021-08-25
Attending: OBSTETRICS & GYNECOLOGY
Payer: COMMERCIAL

## 2021-08-25 ENCOUNTER — HOSPITAL ENCOUNTER (OUTPATIENT)
Dept: ULTRASOUND IMAGING | Facility: CLINIC | Age: 26
End: 2021-08-25
Attending: OBSTETRICS & GYNECOLOGY
Payer: COMMERCIAL

## 2021-08-25 DIAGNOSIS — O40.3XX0 POLYHYDRAMNIOS IN THIRD TRIMESTER COMPLICATION, SINGLE OR UNSPECIFIED FETUS: Primary | ICD-10-CM

## 2021-08-25 DIAGNOSIS — O40.3XX0 POLYHYDRAMNIOS IN THIRD TRIMESTER COMPLICATION, SINGLE OR UNSPECIFIED FETUS: ICD-10-CM

## 2021-08-25 PROCEDURE — 76815 OB US LIMITED FETUS(S): CPT

## 2021-08-25 PROCEDURE — 76815 OB US LIMITED FETUS(S): CPT | Mod: 26 | Performed by: OBSTETRICS & GYNECOLOGY

## 2021-08-25 NOTE — PROGRESS NOTES
"Please see \"Imaging\" tab under Chart Review for full details.    Betty Valdez MD  Maternal Fetal Medicine    "

## 2021-09-10 ENCOUNTER — NURSE TRIAGE (OUTPATIENT)
Dept: NURSING | Facility: CLINIC | Age: 26
End: 2021-09-10

## 2021-09-10 NOTE — TELEPHONE ENCOUNTER
Triage Call: Leobardo, spouse, calling, CTC on file. Headache started lastnight, on and off, stronger around noon, 6/10, comes and goes, temp 97.5 tympanic, vomited once this morning- normally gets morning sickness. Has not tried pain medications and wanting to know what she can take. Per protocol guidelines home care advise was given and advised to call back if pain continues to be severe after 2 hours, or the mild/moderate headache persists, or she worsens. Leobardo stated understanding and will follow advise.     COVID 19 Nurse Triage Plan/Patient Instructions    Please be aware that novel coronavirus (COVID-19) may be circulating in the community. If you develop symptoms such as fever, cough, or SOB or if you have concerns about the presence of another infection including coronavirus (COVID-19), please contact your health care provider or visit [email or web address].     Disposition/Instructions    Home care recommended. Follow home care protocol based instructions.    Thank you for taking steps to prevent the spread of this virus.  o Limit your contact with others.  o Wear a simple mask to cover your cough.  o Wash your hands well and often.    Resources    [facility]: About COVID-19: [email or web address]    CDC: What to Do If You're Sick: www.cdc.gov/coronavirus/2019-ncov/about/steps-when-sick.html    CDC: Ending Home Isolation: www.cdc.gov/coronavirus/2019-ncov/hcp/disposition-in-home-patients.html     CDC: Caring for Someone: www.cdc.gov/coronavirus/2019-ncov/if-you-are-sick/care-for-someone.html     [facility]: Interim Guidance for Hospital Discharge to Home: www.health.[facility].[place].us/diseases/coronavirus/hcp/hospdischarge.pdf    [facility] clinical trials (COVID-19 research studies): [email or web address]     Below are the COVID-19 hotlines at the [place] Department of Health ([facility]). Interpreters are available.   o For health questions: Call 865-380-4939 or  "1-230.594.6808 (7 a.m. to 7 p.m.)  o For questions about schools and childcare: Call 759-467-2661 or 1-972.913.7343 (7 a.m. to 7 p.m.)     Rena Mosley RN Nursing Advisor 9/10/2021 7:17 PM     Reason for Disposition    [1] Headache AND [2] has not taken pain medications    Additional Information    Negative: Difficult to awaken or acting confused (e.g., disoriented, slurred speech)    Negative: [1] Weakness of the face, arm or leg on one side of the body AND [2] new onset    Negative: [1] Numbness of the face, arm or leg on one side of the body AND [2] new onset    Negative: [1] Loss of speech or garbled speech AND [2] new onset    Negative: Sounds like a life-threatening emergency to the triager    Negative: Followed a head injury within last 3 days    Negative: Traumatic Brain Injury (TBI) is suspected    Negative: Sinus pain of forehead and yellow or green nasal discharge    Negative: Unable to walk, or can only walk with assistance (e.g., requires support)    Negative: Stiff neck (can't touch chin to chest)    Negative: Severe pain in one eye    Negative: [1] Other family members (or roommates) with headaches AND [2] possibility of carbon monoxide exposure    Negative: [1] SEVERE headache (e.g., excruciating) AND [2] having contractions or other symptoms of labor    Negative: [1] Pregnant > 20 weeks AND [2] Systolic BP >= 140 OR Diastolic BP >= 90    Negative: [1] SEVERE headache (e.g., excruciating) AND [2] \"worst headache\" of life    Negative: [1] SEVERE headache AND [2] sudden-onset (i.e., reaching maximum intensity within seconds)    Negative: [1] SEVERE headache AND [2] fever    Negative: Loss of vision or double vision (Exception: similar to previous migraines)    Negative: [1] Fever > 100.0 F (37.8 C) AND [2] diabetes mellitus or weak immune system (e.g., HIV positive, cancer chemo, splenectomy, organ transplant, chronic steroids)    Negative: Patient sounds very sick or weak to the triager    Negative: " [1] Pregnant > 20 weeks AND [2] SEVERE headache (e.g., excruciating) AND [3] not improved after 2 hours of pain medicine    Negative: [1] Pregnant > 20 weeks AND [2] new hand or face swelling    Negative: [1] Pregnant > 20 weeks AND [2] sudden weight gain (i.e., more than 3 lbs or 1.4 kg in one week)    Negative: [1] Pregnant > 20 weeks AND [2] new blurred vision or vision changes    Negative: [1] Pregnant 23 or more weeks AND [2] baby is moving less today (e.g., kick count < 5 in 1 hour or < 10 in 2 hours)    Negative: [1] SEVERE headache (e.g., excruciating) AND [2] not improved after 2 hours of pain medicine (Exception: similar to previous migraines)    Negative: [1] Vomiting AND [2] 2 or more times (Exception: similar to previous migraines)    Negative: Fever > 104 F (40 C)    Negative: [1] MODERATE headache (e.g., interferes with normal activities) AND [2] present > 24 hours AND [3] unexplained  (Exceptions: analgesics not tried, typical migraine, or headache part of viral illness)    Negative: [1] New headache AND [2] HIV positive    Negative: [1] Sinus pain of forehead AND [2] yellow or green nasal discharge    Negative: Fever present > 3 days (72 hours)    Negative: [1] MILD-MODERATE headache AND [2] present > 72 hours    Negative: Headache is a chronic symptom (recurrent or ongoing AND present > 4 weeks)    Negative: Similar to previously diagnosed migraine headaches    Protocols used: PREGNANCY - HEADACHE-A-AH

## 2021-09-14 ENCOUNTER — OFFICE VISIT (OUTPATIENT)
Dept: MATERNAL FETAL MEDICINE | Facility: CLINIC | Age: 26
End: 2021-09-14
Attending: OBSTETRICS & GYNECOLOGY
Payer: COMMERCIAL

## 2021-09-14 ENCOUNTER — HOSPITAL ENCOUNTER (OUTPATIENT)
Dept: ULTRASOUND IMAGING | Facility: CLINIC | Age: 26
End: 2021-09-14
Attending: OBSTETRICS & GYNECOLOGY
Payer: COMMERCIAL

## 2021-09-14 DIAGNOSIS — O40.3XX0 POLYHYDRAMNIOS IN THIRD TRIMESTER COMPLICATION, SINGLE OR UNSPECIFIED FETUS: ICD-10-CM

## 2021-09-14 DIAGNOSIS — Z03.73 FETAL ANOMALY SUSPECTED BUT NOT FOUND: Primary | ICD-10-CM

## 2021-09-14 PROCEDURE — 76816 OB US FOLLOW-UP PER FETUS: CPT

## 2021-09-14 PROCEDURE — 76816 OB US FOLLOW-UP PER FETUS: CPT | Mod: 26 | Performed by: OBSTETRICS & GYNECOLOGY

## 2021-09-14 NOTE — PROGRESS NOTES
Please see the imaging tab for details of the ultrasound performed today.    Rena Thompson MD  Specialist in Maternal-Fetal Medicine

## 2021-09-17 ENCOUNTER — PRENATAL OFFICE VISIT (OUTPATIENT)
Dept: OBGYN | Facility: CLINIC | Age: 26
End: 2021-09-17
Payer: COMMERCIAL

## 2021-09-17 VITALS
DIASTOLIC BLOOD PRESSURE: 70 MMHG | WEIGHT: 224.9 LBS | SYSTOLIC BLOOD PRESSURE: 100 MMHG | HEIGHT: 67 IN | BODY MASS INDEX: 35.3 KG/M2

## 2021-09-17 DIAGNOSIS — Z30.09 ENCOUNTER FOR COUNSELING REGARDING CONTRACEPTION: ICD-10-CM

## 2021-09-17 DIAGNOSIS — O40.9XX0 POLYHYDRAMNIOS AFFECTING PREGNANCY: ICD-10-CM

## 2021-09-17 DIAGNOSIS — Z23 NEED FOR TDAP VACCINATION: ICD-10-CM

## 2021-09-17 DIAGNOSIS — O09.91 HIGH-RISK PREGNANCY, FIRST TRIMESTER: Primary | ICD-10-CM

## 2021-09-17 DIAGNOSIS — O36.63X0 EXCESSIVE FETAL GROWTH AFFECTING MANAGEMENT OF PREGNANCY IN THIRD TRIMESTER, SINGLE OR UNSPECIFIED FETUS: ICD-10-CM

## 2021-09-17 DIAGNOSIS — Z34.93 PRENATAL CARE IN THIRD TRIMESTER: ICD-10-CM

## 2021-09-17 LAB
ERYTHROCYTE [DISTWIDTH] IN BLOOD BY AUTOMATED COUNT: 14.4 % (ref 10–15)
HBA1C MFR BLD: 5.6 % (ref 0–5.6)
HCT VFR BLD AUTO: 32.8 % (ref 35–47)
HGB BLD-MCNC: 10.6 G/DL (ref 11.7–15.7)
MCH RBC QN AUTO: 26.7 PG (ref 26.5–33)
MCHC RBC AUTO-ENTMCNC: 32.3 G/DL (ref 31.5–36.5)
MCV RBC AUTO: 83 FL (ref 78–100)
PLATELET # BLD AUTO: 265 10E3/UL (ref 150–450)
RBC # BLD AUTO: 3.97 10E6/UL (ref 3.8–5.2)
WBC # BLD AUTO: 8.5 10E3/UL (ref 4–11)

## 2021-09-17 PROCEDURE — 36415 COLL VENOUS BLD VENIPUNCTURE: CPT | Performed by: ADVANCED PRACTICE MIDWIFE

## 2021-09-17 PROCEDURE — 85027 COMPLETE CBC AUTOMATED: CPT | Performed by: ADVANCED PRACTICE MIDWIFE

## 2021-09-17 PROCEDURE — 90471 IMMUNIZATION ADMIN: CPT | Performed by: ADVANCED PRACTICE MIDWIFE

## 2021-09-17 PROCEDURE — 83036 HEMOGLOBIN GLYCOSYLATED A1C: CPT | Performed by: ADVANCED PRACTICE MIDWIFE

## 2021-09-17 PROCEDURE — 82952 GTT-ADDED SAMPLES: CPT | Performed by: ADVANCED PRACTICE MIDWIFE

## 2021-09-17 PROCEDURE — 90715 TDAP VACCINE 7 YRS/> IM: CPT | Performed by: ADVANCED PRACTICE MIDWIFE

## 2021-09-17 PROCEDURE — 99207 PR PRENATAL VISIT: CPT | Performed by: ADVANCED PRACTICE MIDWIFE

## 2021-09-17 PROCEDURE — 86780 TREPONEMA PALLIDUM: CPT | Performed by: ADVANCED PRACTICE MIDWIFE

## 2021-09-17 ASSESSMENT — MIFFLIN-ST. JEOR: SCORE: 1792.77

## 2021-09-17 NOTE — NURSING NOTE
"Chief Complaint   Patient presents with     Prenatal Care       Initial /70 (BP Location: Left arm, Cuff Size: Adult Regular)   Ht 1.702 m (5' 7\")   Wt 102 kg (224 lb 14.4 oz)   LMP 2021   Breastfeeding No   BMI 35.22 kg/m   Estimated body mass index is 35.22 kg/m  as calculated from the following:    Height as of this encounter: 1.702 m (5' 7\").    Weight as of this encounter: 102 kg (224 lb 14.4 oz).  BP completed using cuff size: regular    Questioned patient about current smoking habits.  Pt. has never smoked.               "

## 2021-09-17 NOTE — PROGRESS NOTES
"S: Feels well,  Baby active.  Denies uterine cramping, vaginal bleeding or leaking of fluid    Has not been seen in the clinic since 7/9/21 but has been following with MFM for mild polyhydramnios (NO 24.9) and LGA (EFW 98%) noted on 28wk US with MFM.     MFM US 9/14 showed normal NO and EFW 89%.     She has had numerous no-shows so has not had her 28wk glucose test yet. She did have an A1C in the first trimester which was 5.7.    O: Vitals: /70 (BP Location: Left arm, Cuff Size: Adult Regular)   Ht 1.702 m (5' 7\")   Wt 102 kg (224 lb 14.4 oz)   LMP 01/16/2021   Breastfeeding No   BMI 35.22 kg/m    BMI= Body mass index is 35.22 kg/m .  Exam:  Constitutional: healthy, alert and no distress  Respiratory: respirations even and unlabored  Gastrointestinal: Abdomen soft, non-tender. Fundus measures large for gestational age. Fetal heart tones hear without difficulty and within normal limits  : Deferred  Psychiatric: mentation appears normal and affect normal/bright  A/P:    1. (O09.91) High-risk pregnancy, first trimester  (primary encounter diagnosis)  Plan: Treponema Abs w Reflex to RPR and Titer,         Glucose tolerance gest screen 1 hour, CBC with         platelets, TDAP (ADACEL,BOOSTRIX), Hemoglobin         A1c    2. (Z23) Need for Tdap vaccination  Plan: TDAP (ADACEL,BOOSTRIX)    3. (O36.63X0) Excessive fetal growth affecting management of pregnancy in third trimester, single or unspecified fetus  95% on 27 week sono.   34wk US EFW 89% (2880g) with normal fluid    4. (O40.9XX0) Polyhydramnios affecting pregnancy  NO 24.9 at 27wks  RESOLVED by 32wks  Normal on last Us at 34wks with MFM    5. (Z30.09) Encounter for counseling regarding contraception  - States today that she would like a tubal ligation for birth control. She states she is certain she is done having children. We did discuss LARCs such as Nexplanon and IUDs but she states she would prefer sterilization. Recommended she consult with MD " for next visit.      GCT/repeat RPR today, handout provided.  Tdap given; reviewed CDC recommendations and partner/family vaccination recommended as well.  Need for Rhogam? No.   Discussed patient options for postpartum contraception. Patient is planning tubal ligation  Encouraged patient to call with any questions or concerns.  Return to clinic1 weeks    SADIE Edwards, KHALIDAM

## 2021-09-18 LAB — GLUCOSE 1H P 50 G GLC PO SERPL-MCNC: 117 MG/DL (ref 70–129)

## 2021-09-20 LAB — T PALLIDUM AB SER QL: NONREACTIVE

## 2021-09-24 ENCOUNTER — PRENATAL OFFICE VISIT (OUTPATIENT)
Dept: OBGYN | Facility: CLINIC | Age: 26
End: 2021-09-24
Payer: COMMERCIAL

## 2021-09-24 VITALS — WEIGHT: 220 LBS | DIASTOLIC BLOOD PRESSURE: 68 MMHG | SYSTOLIC BLOOD PRESSURE: 98 MMHG | BODY MASS INDEX: 34.46 KG/M2

## 2021-09-24 DIAGNOSIS — Z34.93 PRENATAL CARE IN THIRD TRIMESTER: Primary | ICD-10-CM

## 2021-09-24 PROCEDURE — 87653 STREP B DNA AMP PROBE: CPT | Performed by: ADVANCED PRACTICE MIDWIFE

## 2021-09-24 PROCEDURE — 99207 PR PRENATAL VISIT: CPT | Performed by: ADVANCED PRACTICE MIDWIFE

## 2021-09-24 NOTE — PROGRESS NOTES
Feeling well.  Baby is active. Denies any leaking of fluid, vaginal bleeding, regular uterine contractions, or headaches or other concerns.  GBS explained and collected.    She has an appt with Dr. Rojas 10/8.  As it is so late in pregnancy, I asked Dr. Rojas if Marie and I could sign the consent ahead time.  Consent signed me with WINSTON with his consent.  I told her she still needs to meet with an OB MD and the consent just gives her the option.  Lubal ligations are often done at a separate visit, not right after birth.  She verbalized understanding.    Reviewed to call for contractions, loss of fluid, vaginal bleeding, decreased fetal movement or any other questions or concerns.    RTC in 1 weeks.  Lashell Limon, KATELYN, APRN, WINSTON

## 2021-09-25 LAB
GP B STREP DNA SPEC QL NAA+PROBE: NEGATIVE
PATIENT PENICILLIN, AMOXICILLIN, CEPHALOSPORINS ALLERGY: NO

## 2021-10-01 ENCOUNTER — PRENATAL OFFICE VISIT (OUTPATIENT)
Dept: OBGYN | Facility: CLINIC | Age: 26
End: 2021-10-01
Payer: COMMERCIAL

## 2021-10-01 VITALS
DIASTOLIC BLOOD PRESSURE: 56 MMHG | BODY MASS INDEX: 35.39 KG/M2 | HEIGHT: 67 IN | SYSTOLIC BLOOD PRESSURE: 94 MMHG | WEIGHT: 225.5 LBS

## 2021-10-01 DIAGNOSIS — O09.299 H/O MACROSOMIA IN INFANT IN PRIOR PREGNANCY, CURRENTLY PREGNANT: ICD-10-CM

## 2021-10-01 DIAGNOSIS — Z34.93 PRENATAL CARE IN THIRD TRIMESTER: Primary | ICD-10-CM

## 2021-10-01 PROCEDURE — 99207 PR PRENATAL VISIT: CPT | Performed by: ADVANCED PRACTICE MIDWIFE

## 2021-10-01 ASSESSMENT — MIFFLIN-ST. JEOR: SCORE: 1795.49

## 2021-10-01 NOTE — PROGRESS NOTES
Feeling well.  Baby is active. Denies any leaking of fluid, vaginal bleeding, regular uterine contractions, or headaches or other concerns.  US ordered to confirm cephalic presentation and to estimate fetal weight.  Difficult to palpate due to maternal habitus.  She has a history of babies 9 lbs 11 ozs and almost 9 lbs - vaginal births.  US discussed and Marie ageed.  We discussed that the weight will be and estimate only.    US was done by [facility] and fetus was 89%tile at 34 weeks.  No additional follow up recommended   Reviewed to call for contractions, loss of fluid, vaginal bleeding, decreased fetal movement or any other questions or concerns.    RTC in 1 weeks - she has an ruby with WINSTON and Dr. Rojas next week to discuss PPTL.  Lashell Limon, KATELYN, APRN, WINSTON

## 2021-10-03 ENCOUNTER — HEALTH MAINTENANCE LETTER (OUTPATIENT)
Age: 26
End: 2021-10-03

## 2021-10-04 ENCOUNTER — APPOINTMENT (OUTPATIENT)
Dept: INTERPRETER SERVICES | Facility: CLINIC | Age: 26
End: 2021-10-04
Payer: COMMERCIAL

## 2021-10-08 ENCOUNTER — PRENATAL OFFICE VISIT (OUTPATIENT)
Dept: OBGYN | Facility: CLINIC | Age: 26
End: 2021-10-08
Payer: COMMERCIAL

## 2021-10-08 VITALS — WEIGHT: 223.5 LBS | BODY MASS INDEX: 35.01 KG/M2 | DIASTOLIC BLOOD PRESSURE: 68 MMHG | SYSTOLIC BLOOD PRESSURE: 104 MMHG

## 2021-10-08 DIAGNOSIS — O09.93 HIGH-RISK PREGNANCY IN THIRD TRIMESTER: ICD-10-CM

## 2021-10-08 DIAGNOSIS — O09.299 H/O MACROSOMIA IN INFANT IN PRIOR PREGNANCY, CURRENTLY PREGNANT: ICD-10-CM

## 2021-10-08 DIAGNOSIS — Z30.09 STERILIZATION CONSULT: Primary | ICD-10-CM

## 2021-10-08 DIAGNOSIS — O99.213 OBESITY AFFECTING PREGNANCY IN THIRD TRIMESTER: ICD-10-CM

## 2021-10-08 PROBLEM — O40.9XX0 POLYHYDRAMNIOS AFFECTING PREGNANCY: Status: RESOLVED | Noted: 2021-08-06 | Resolved: 2021-10-08

## 2021-10-08 PROCEDURE — 99207 PR COMPLICATED OB VISIT: CPT | Performed by: OBSTETRICS & GYNECOLOGY

## 2021-10-08 NOTE — NURSING NOTE
"Chief Complaint   Patient presents with     Prenatal Care     37 weeks 6 days GBS= NEG      Consult     Tubal consult ... consent signed        Initial /68 (BP Location: Right arm, Cuff Size: Adult Large)   Wt 101.4 kg (223 lb 8 oz)   LMP 2021   Breastfeeding No   BMI 35.01 kg/m   Estimated body mass index is 35.01 kg/m  as calculated from the following:    Height as of 10/1/21: 1.702 m (5' 7\").    Weight as of this encounter: 101.4 kg (223 lb 8 oz).  BP completed using cuff size: large    Questioned patient about current smoking habits.  Pt. has never smoked.    37w6d      The following HM Due: NONE    +FM daily   +Tubal consent signed   +Pelvic pressure         Esla Ramires, JEFF on 10/8/2021 at 3:08 PM           "

## 2021-10-08 NOTE — PROGRESS NOTES
I communicated with Pt via  because Pt speaks no/limited English.    No c/o's.  Here to discuss possible sterilization postpartum.  Pt sees CNM service.  Cx-3/50/-3/Vtx.  Signed MA form on 9/24/201.  I reviewed both PP Bilat Salpingectomy and interval Lap Bilat Salpingectomy at 6 weeks PP.  Pt's body habitus (BMI) may not allow PP Bilat Salpingectomy for technical reasons and this will need to be decided by OB on call who would be doing it.  I also explained that I personally do not perform postpartum Bilateral Salpingectomies due to the technical challenges that it often presents. So it will depend on the OB on call MD's judgement whether PP sterilization is able to be done.  If not, then Pt should f/u with OB at 4 weeks Postpartum to do initial PP checkup and scheduling of Lap Bilat Salpingectomy to be done around 6 weeks postpartum.  Also I advised Pt that if she were to labor and deliver before 30 days have elapsed from the MA form signature, she will need to f/u 4 weeks postpartum to arrange interval Lap Bilat Salp w/ MD regardless.  Pt and  also wanted to review alternatives, and I reviewed that he could undergo vasectomy, or she could use OCPs, Depo-Provera, IUDs, etc.  They will consider options.  Fetal movement counts BID, rupture of membranes/labor precautions reviewed.  RTC 1 week(s).    Encounter Diagnoses   Name Primary?     Sterilization consult Yes     Obesity affecting pregnancy in third trimester      H/O macrosomia in infant in prior pregnancy, currently pregnant      High-risk pregnancy in third trimester        Risk factors listed above are stable and being addressed as noted.    Rolando Rojas MD  [facility]

## 2021-10-10 ENCOUNTER — NURSE TRIAGE (OUTPATIENT)
Dept: NURSING | Facility: CLINIC | Age: 26
End: 2021-10-10

## 2021-10-10 ENCOUNTER — HOSPITAL ENCOUNTER (INPATIENT)
Facility: CLINIC | Age: 26
LOS: 1 days | Discharge: HOME | End: 2021-10-11
Attending: ADVANCED PRACTICE MIDWIFE | Admitting: ADVANCED PRACTICE MIDWIFE
Payer: COMMERCIAL

## 2021-10-10 PROBLEM — Z36.89 ENCOUNTER FOR TRIAGE IN PREGNANT PATIENT: Status: ACTIVE | Noted: 2021-10-10

## 2021-10-10 LAB
ABO/RH(D): NORMAL
ANTIBODY SCREEN: NEGATIVE
HGB BLD-MCNC: 10.6 G/DL (ref 11.7–15.7)
SARS-COV-2 RNA RESP QL NAA+PROBE: NEGATIVE
SPECIMEN EXPIRATION DATE: NORMAL
T PALLIDUM AB SER QL: NONREACTIVE

## 2021-10-10 PROCEDURE — 59400 OBSTETRICAL CARE: CPT | Performed by: ADVANCED PRACTICE MIDWIFE

## 2021-10-10 PROCEDURE — 85018 HEMOGLOBIN: CPT | Performed by: ADVANCED PRACTICE MIDWIFE

## 2021-10-10 PROCEDURE — 86780 TREPONEMA PALLIDUM: CPT | Performed by: ADVANCED PRACTICE MIDWIFE

## 2021-10-10 PROCEDURE — 250N000009 HC RX 250: Performed by: ADVANCED PRACTICE MIDWIFE

## 2021-10-10 PROCEDURE — 120N000001 HC R&B MED SURG/OB

## 2021-10-10 PROCEDURE — G0463 HOSPITAL OUTPT CLINIC VISIT: HCPCS

## 2021-10-10 PROCEDURE — 10907ZC DRAINAGE OF AMNIOTIC FLUID, THERAPEUTIC FROM PRODUCTS OF CONCEPTION, VIA NATURAL OR ARTIFICIAL OPENING: ICD-10-PCS | Performed by: ADVANCED PRACTICE MIDWIFE

## 2021-10-10 PROCEDURE — 86900 BLOOD TYPING SEROLOGIC ABO: CPT | Performed by: ADVANCED PRACTICE MIDWIFE

## 2021-10-10 PROCEDURE — 250N000013 HC RX MED GY IP 250 OP 250 PS 637: Performed by: ADVANCED PRACTICE MIDWIFE

## 2021-10-10 PROCEDURE — 722N000001 HC LABOR CARE VAGINAL DELIVERY SINGLE

## 2021-10-10 PROCEDURE — 258N000003 HC RX IP 258 OP 636: Performed by: ADVANCED PRACTICE MIDWIFE

## 2021-10-10 PROCEDURE — 87635 SARS-COV-2 COVID-19 AMP PRB: CPT | Performed by: ADVANCED PRACTICE MIDWIFE

## 2021-10-10 RX ORDER — NALOXONE HYDROCHLORIDE 0.4 MG/ML
0.2 INJECTION, SOLUTION INTRAMUSCULAR; INTRAVENOUS; SUBCUTANEOUS
Status: DISCONTINUED | OUTPATIENT
Start: 2021-10-10 | End: 2021-10-10

## 2021-10-10 RX ORDER — OXYTOCIN/0.9 % SODIUM CHLORIDE 30/500 ML
1-24 PLASTIC BAG, INJECTION (ML) INTRAVENOUS CONTINUOUS
Status: DISCONTINUED | OUTPATIENT
Start: 2021-10-10 | End: 2021-10-10

## 2021-10-10 RX ORDER — ONDANSETRON 2 MG/ML
4 INJECTION INTRAMUSCULAR; INTRAVENOUS EVERY 6 HOURS PRN
Status: DISCONTINUED | OUTPATIENT
Start: 2021-10-10 | End: 2021-10-10

## 2021-10-10 RX ORDER — TRANEXAMIC ACID 10 MG/ML
1 INJECTION, SOLUTION INTRAVENOUS EVERY 30 MIN PRN
Status: DISCONTINUED | OUTPATIENT
Start: 2021-10-10 | End: 2021-10-11 | Stop reason: HOSPADM

## 2021-10-10 RX ORDER — MISOPROSTOL 200 UG/1
800 TABLET ORAL
Status: DISCONTINUED | OUTPATIENT
Start: 2021-10-10 | End: 2021-10-11 | Stop reason: HOSPADM

## 2021-10-10 RX ORDER — ONDANSETRON 4 MG/1
4 TABLET, ORALLY DISINTEGRATING ORAL EVERY 6 HOURS PRN
Status: DISCONTINUED | OUTPATIENT
Start: 2021-10-10 | End: 2021-10-10

## 2021-10-10 RX ORDER — MISOPROSTOL 200 UG/1
800 TABLET ORAL
Status: DISCONTINUED | OUTPATIENT
Start: 2021-10-10 | End: 2021-10-10

## 2021-10-10 RX ORDER — DOCUSATE SODIUM 100 MG/1
100 CAPSULE, LIQUID FILLED ORAL DAILY
Status: DISCONTINUED | OUTPATIENT
Start: 2021-10-10 | End: 2021-10-11 | Stop reason: HOSPADM

## 2021-10-10 RX ORDER — PROCHLORPERAZINE 25 MG
25 SUPPOSITORY, RECTAL RECTAL EVERY 12 HOURS PRN
Status: DISCONTINUED | OUTPATIENT
Start: 2021-10-10 | End: 2021-10-10

## 2021-10-10 RX ORDER — TRANEXAMIC ACID 10 MG/ML
1 INJECTION, SOLUTION INTRAVENOUS EVERY 30 MIN PRN
Status: DISCONTINUED | OUTPATIENT
Start: 2021-10-10 | End: 2021-10-10

## 2021-10-10 RX ORDER — CARBOPROST TROMETHAMINE 250 UG/ML
250 INJECTION, SOLUTION INTRAMUSCULAR
Status: DISCONTINUED | OUTPATIENT
Start: 2021-10-10 | End: 2021-10-11 | Stop reason: HOSPADM

## 2021-10-10 RX ORDER — MODIFIED LANOLIN
OINTMENT (GRAM) TOPICAL
Status: DISCONTINUED | OUTPATIENT
Start: 2021-10-10 | End: 2021-10-11 | Stop reason: HOSPADM

## 2021-10-10 RX ORDER — PROCHLORPERAZINE MALEATE 10 MG
10 TABLET ORAL EVERY 6 HOURS PRN
Status: DISCONTINUED | OUTPATIENT
Start: 2021-10-10 | End: 2021-10-10

## 2021-10-10 RX ORDER — BISACODYL 10 MG
10 SUPPOSITORY, RECTAL RECTAL DAILY PRN
Status: DISCONTINUED | OUTPATIENT
Start: 2021-10-10 | End: 2021-10-11 | Stop reason: HOSPADM

## 2021-10-10 RX ORDER — KETOROLAC TROMETHAMINE 30 MG/ML
30 INJECTION, SOLUTION INTRAMUSCULAR; INTRAVENOUS
Status: DISCONTINUED | OUTPATIENT
Start: 2021-10-10 | End: 2021-10-10

## 2021-10-10 RX ORDER — OXYTOCIN 10 [USP'U]/ML
10 INJECTION, SOLUTION INTRAMUSCULAR; INTRAVENOUS
Status: DISCONTINUED | OUTPATIENT
Start: 2021-10-10 | End: 2021-10-10

## 2021-10-10 RX ORDER — MISOPROSTOL 200 UG/1
400 TABLET ORAL
Status: DISCONTINUED | OUTPATIENT
Start: 2021-10-10 | End: 2021-10-10

## 2021-10-10 RX ORDER — OXYTOCIN 10 [USP'U]/ML
10 INJECTION, SOLUTION INTRAMUSCULAR; INTRAVENOUS
Status: DISCONTINUED | OUTPATIENT
Start: 2021-10-10 | End: 2021-10-11 | Stop reason: HOSPADM

## 2021-10-10 RX ORDER — IBUPROFEN 800 MG/1
800 TABLET, FILM COATED ORAL EVERY 6 HOURS PRN
Status: DISCONTINUED | OUTPATIENT
Start: 2021-10-10 | End: 2021-10-11 | Stop reason: HOSPADM

## 2021-10-10 RX ORDER — LIDOCAINE 40 MG/G
CREAM TOPICAL
Status: DISCONTINUED | OUTPATIENT
Start: 2021-10-10 | End: 2021-10-10

## 2021-10-10 RX ORDER — HYDROCORTISONE 2.5 %
CREAM (GRAM) TOPICAL 3 TIMES DAILY PRN
Status: DISCONTINUED | OUTPATIENT
Start: 2021-10-10 | End: 2021-10-11 | Stop reason: HOSPADM

## 2021-10-10 RX ORDER — IBUPROFEN 600 MG/1
600 TABLET, FILM COATED ORAL
Status: DISCONTINUED | OUTPATIENT
Start: 2021-10-10 | End: 2021-10-10

## 2021-10-10 RX ORDER — METOCLOPRAMIDE HYDROCHLORIDE 5 MG/ML
10 INJECTION INTRAMUSCULAR; INTRAVENOUS EVERY 6 HOURS PRN
Status: DISCONTINUED | OUTPATIENT
Start: 2021-10-10 | End: 2021-10-10

## 2021-10-10 RX ORDER — FENTANYL CITRATE 50 UG/ML
50-100 INJECTION, SOLUTION INTRAMUSCULAR; INTRAVENOUS
Status: DISCONTINUED | OUTPATIENT
Start: 2021-10-10 | End: 2021-10-10

## 2021-10-10 RX ORDER — METHYLERGONOVINE MALEATE 0.2 MG/ML
200 INJECTION INTRAVENOUS
Status: DISCONTINUED | OUTPATIENT
Start: 2021-10-10 | End: 2021-10-11 | Stop reason: HOSPADM

## 2021-10-10 RX ORDER — OXYTOCIN/0.9 % SODIUM CHLORIDE 30/500 ML
340 PLASTIC BAG, INJECTION (ML) INTRAVENOUS CONTINUOUS PRN
Status: COMPLETED | OUTPATIENT
Start: 2021-10-10 | End: 2021-10-10

## 2021-10-10 RX ORDER — MISOPROSTOL 200 UG/1
400 TABLET ORAL
Status: DISCONTINUED | OUTPATIENT
Start: 2021-10-10 | End: 2021-10-11 | Stop reason: HOSPADM

## 2021-10-10 RX ORDER — NALOXONE HYDROCHLORIDE 0.4 MG/ML
0.4 INJECTION, SOLUTION INTRAMUSCULAR; INTRAVENOUS; SUBCUTANEOUS
Status: DISCONTINUED | OUTPATIENT
Start: 2021-10-10 | End: 2021-10-10

## 2021-10-10 RX ORDER — ACETAMINOPHEN 325 MG/1
650 TABLET ORAL EVERY 4 HOURS PRN
Status: DISCONTINUED | OUTPATIENT
Start: 2021-10-10 | End: 2021-10-11 | Stop reason: HOSPADM

## 2021-10-10 RX ORDER — CARBOPROST TROMETHAMINE 250 UG/ML
250 INJECTION, SOLUTION INTRAMUSCULAR
Status: DISCONTINUED | OUTPATIENT
Start: 2021-10-10 | End: 2021-10-10

## 2021-10-10 RX ORDER — METHYLERGONOVINE MALEATE 0.2 MG/ML
200 INJECTION INTRAVENOUS
Status: DISCONTINUED | OUTPATIENT
Start: 2021-10-10 | End: 2021-10-10

## 2021-10-10 RX ORDER — OXYTOCIN/0.9 % SODIUM CHLORIDE 30/500 ML
340 PLASTIC BAG, INJECTION (ML) INTRAVENOUS CONTINUOUS PRN
Status: DISCONTINUED | OUTPATIENT
Start: 2021-10-10 | End: 2021-10-11 | Stop reason: HOSPADM

## 2021-10-10 RX ORDER — METOCLOPRAMIDE 10 MG/1
10 TABLET ORAL EVERY 6 HOURS PRN
Status: DISCONTINUED | OUTPATIENT
Start: 2021-10-10 | End: 2021-10-10

## 2021-10-10 RX ORDER — OXYTOCIN/0.9 % SODIUM CHLORIDE 30/500 ML
100-340 PLASTIC BAG, INJECTION (ML) INTRAVENOUS CONTINUOUS PRN
Status: DISCONTINUED | OUTPATIENT
Start: 2021-10-10 | End: 2021-10-10

## 2021-10-10 RX ORDER — SODIUM CHLORIDE, SODIUM LACTATE, POTASSIUM CHLORIDE, CALCIUM CHLORIDE 600; 310; 30; 20 MG/100ML; MG/100ML; MG/100ML; MG/100ML
INJECTION, SOLUTION INTRAVENOUS CONTINUOUS
Status: DISCONTINUED | OUTPATIENT
Start: 2021-10-10 | End: 2021-10-10

## 2021-10-10 RX ADMIN — Medication 340 ML/HR: at 18:11

## 2021-10-10 RX ADMIN — SODIUM CHLORIDE, POTASSIUM CHLORIDE, SODIUM LACTATE AND CALCIUM CHLORIDE 125 ML/HR: 600; 310; 30; 20 INJECTION, SOLUTION INTRAVENOUS at 13:13

## 2021-10-10 RX ADMIN — IBUPROFEN 800 MG: 800 TABLET, FILM COATED ORAL at 20:00

## 2021-10-10 RX ADMIN — Medication 2 MILLI-UNITS/MIN: at 13:14

## 2021-10-10 NOTE — PROGRESS NOTES
WINSTON PROGRESS NOTE    SUBJECTIVE:  Patient reports that contractions feel like more pressure. Has been up walking and rocking in the room.    OBJECTIVE:  /71   Temp 97.9  F (36.6  C) (Oral)   Resp 16   LMP 2021     Fetal heart tones: Baseline 130   Variability: moderate  Accelerations: present  Decelerations: absent    Contractions: Pt is anshu every 2-5 minutes, lasting 30-45 seconds and palpates moderate    Cervix: 4.5/ 50%/ -2, Vtx  ROM: scant clear fluid    Pitocin- none  Antibiotics- none  Cervical ripening: N/A    ASSESSMENT:  IUP @ 38w1d early labor   GBS- negative     PLAN:     Anticipate   reevaluate in 2 hours/PRN  If no change, will recommend augmentation    GREGORIO BOWER CNM

## 2021-10-10 NOTE — PROVIDER NOTIFICATION
10/10/21 0535   Provider Notification   Provider Name/Title Rebecca Cristobal CNM   Method of Notification Phone   Request Evaluate - Remote   Notification Reason SVE;Status Update   CNM notified of SVE of 4.5/60/-2, cervix mid position and softer.  UC's with last monitoring q5-8 minutes, FHR Cat. I.  Pt's last two deliveries un-medicated and planning the same for this delivery.  WINSTON Fernandez verbalized understanding, TORB for intrapartum orders.

## 2021-10-10 NOTE — PROGRESS NOTES
WINSTON PROGRESS NOTE    SUBJECTIVE:  Starting to feel painful with contractions. Requesting SVE    OBJECTIVE:  /62   Temp 98.2  F (36.8  C) (Oral)   Resp 16   LMP 2021     Fetal heart tones: Baseline 132   Variability: moderate  Accelerations: present  Decelerations: absent    Contractions: Pt is anshu every 2-4 minutes, lasting 45-60 seconds and palpates strong    Cervix: 7/ 50%/ -2, Vtx  ROM: clear fluid    Pitocin- 5 mu/min.  Antibiotics- none  Cervical ripening: N/A    ASSESSMENT:  IUP @ 38w1d active labor   GBS- negative     PLAN:     Anticipate   Labor augmentation with Pitocin  reevaluate in 1-2 hours/PRN    GREGORIO BOWER CNM

## 2021-10-10 NOTE — L&D DELIVERY NOTE
OB Vaginal Delivery Note    Marie Matos MRN# [id number]   Age: 26 year old YOB: 1995       GA: 38w1d  GP:   Labor Complications: None   EBL:   mL  Delivery QBL: 400 mL  Delivery Type: Vaginal, Spontaneous   ROM to Delivery Time: (Delivered) Hours: 7 Minutes: 51   Weight:     1 Minute 5 Minute 10 Minute   Apgar Totals: 8   9        AFRICA THOMSON;CATHERINE VELASCO;GREGORIO BOWER       Delivery Note    IUP at 38 weeks gestation delivered on 10/10/2021.     delivery of a viable  Male infant.  Apgars of 9 at 1 minute and 9 at 5 minutes.  Labor was augmented.    Medications administered  in labor:  Pain Rx none; Antibiotics No; Other Pitocin given IV for active management of third stage of labor  Perineum: Intact, delayed cord clamping Yes  Placenta-mechanism: spontaneous, intact,  with a 3 vessel cord. IV oxytocin was given.  Estimated Blood Loss:  400cc's  Complications of pregnancy, labor and delivery: None    SADIE Ackerman, WINSTON  Delivery Details:  Marie Matos, a 26 year old  female delivered a viable infant with apgars of 8  and 9 . Patient was fully dilated and pushing after   hours   minutes in active labor. Delivery was via vaginal, spontaneous  to a sterile field under   anesthesia. Infant delivered in vertex  right  occiput    position. Anterior and posterior shoulders delivered without difficulty. The cord was clamped, cut twice and 3 vessels  were noted. Cord blood was obtained in routine fashion with the following disposition: lab .      Cord complications: nuchal   Placenta delivered at 10/10/2021  6:11 PM . Placental disposition was Hospital disposal . Fundal massage performed and fundus found to be firm.     Episiotomy: none    Perineum, vagina, cervix were inspected, and the following lacerations were noted:   Perineal lacerations: none                    Excellent hemostasis was noted. Needle count correct. Infant and patient in delivery room in good  and stable condition.        Safia Matos [[id number]]    Labor Event Times    Labor onset date: 10/10/21 Onset time:  1:00 PM      Rupture date/time: 10/10/21 1014   Rupture type: Artificial Rupture of Membranes  Fluid color: Clear  Fluid odor: Normal     Augmentation: Oxytocin  Indications for augmentation: Ineffective Contraction Pattern     Delivery/Placenta Date and Time    Delivery Date: 10/10/21 Delivery Time:  6:05 PM   Placenta Date/Time: 10/10/2021  6:11 PM  Delivering clinician: Ruby Blancas CNM   Other personnel present at delivery:  Provider Role   Maribell Reed RN Erickson, Dina A, RN Johnson, Mary Jo, CNM Certified Nurse Midwife         Vaginal Counts     Initial count performed by 2 team members:  Two Team Members   WINSTON Beltran RN       Needles Suture Needles Sponges (RETIRED) Instruments   Initial counts 2  5    Added to count       Relief counts       Final counts 2  5          Placed during labor Accounted for at the end of labor   FSE No NA   IUPC No NA   Cervadil No NA              Final count performed by 2 team members:  Two Team Members   WINSTON Beltran RN      Final count correct?: Yes     Apgars    Living status: Living   1 Minute 5 Minute 10 Minute 15 Minute 20 Minute   Skin color: 0  1       Heart rate: 2  2       Reflex irritability: 2  2       Muscle tone: 2  2       Respiratory effort: 2  2       Total: 8  9       Apgars assigned by: TIMI OLIVEROS     Cord    Vessels: 3 Vessels    Cord Complications: Nuchal         Nuchal cord description: loose nuchal cord         Cord Blood Disposition: Lab    Gases Sent?: No    Delayed cord clamping?: Yes    Cord Clamping Delay (seconds):  seconds    Stem cell collection?: No       Labor Events and Shoulder Dystocia    Fetal Tracing Prior to Delivery: Category 1  Shoulder dystocia present?: Neg     Delivery (Maternal) (Provider to Complete) ([id number])    Episiotomy: None  Perineal lacerations: None    Repair suture:  None  Genital tract inspection done: Pos     Blood Loss  Mother: Marie Matos #[id number]   Start of Mother's Information    Delivery Blood Loss  10/10/21 1300 - 10/10/21 1819    Delivery QBL (mL) Hospital Encounter 400 mL    Total  400 mL         End of Mother's Information  Mother: Marie Matos #[id number]          Delivery - Provider to Complete ([id number])    Delivering clinician: Gregorio Blancas CNM CNM Care: Exclusive CNM care in labor  Attempted Delivery Types (Choose all that apply): Spontaneous Vaginal Delivery  Delivery Type (Choose the 1 that will go to the Birth History): Vaginal, Spontaneous                   Other personnel:  Provider Role   Maribell Reed RN Erickson, Dina A, RN Johnson, Mary Jo, CNM Certified Nurse Midwife                Placenta    Date/Time: 10/10/2021  6:11 PM  Removal: Spontaneous  Disposition: Hospital disposal           Presentation and Position    Presentation: Vertex    Position: Right Occiput                  GREGORIO BLANCAS CNM

## 2021-10-10 NOTE — H&P
WINSTON Labor Admission History & Physical    Marie Matos is a 26 year old  with an IUP at 38w1d  ; ,   Partner/support Person: Jared   Language Barrier: Luxembourgish - declining professional interpretor and desires Jared to translate   Clinic: [facility]  Provider: SADIE Edwards CNM      Marie Matos is admitted to the Birthplace at [facility] on 10/10/2021 at 6:24 AM       History of present inllness/Chief Complaint:      Marie states contractions started last night at 2140. They began getting stronger and closer together. She presented to triage around 0300 and was initially 3cm dilated. After two hours,cervix changed to 4-5cm so she was admitted for labor.     Here with: spontaneous onset of labor  Patient reports contractions are Regular           Baby active Yes  Membranes are intact.  Bloody show No   Any changes with medical history since last prenatal visit No    Obstetrical history  Estimated Date of Delivery: Oct 23, 2021 determined by 11wk US / LMP  Patient's last menstrual period was 2021.   Dating U/S: 21    Fetal anatomic survey: Normal  Placenta: posterior     PRENATAL COURSE  Prenatal care began at 14 wks gestation for a total of 9 prenatal visits.  Total wt gain 1lb; There is no height or weight on file to calculate BMI.  Prenatal Blood Pressure: WNL  Prenatal course was   complicated by    Patient Active Problem List    Diagnosis Date Noted     Encounter for triage in pregnant patient 10/10/2021     Priority: Medium     Indication for care in labor or delivery 10/10/2021     Priority: Medium     Obesity affecting pregnancy in third trimester 10/08/2021     Priority: Medium     Excessive fetal growth affecting management of pregnancy in third trimester 2021     Priority: Medium     95% on 27 week sono.  [facility] referral sent   34wk US EFW 89% with normal fluid       Abnormal fetal ultrasound 2021     Priority: Medium     At risk for diabetes  mellitus 04/26/2021     Priority: Medium     High BMI, at risk ethnicity       BMI 32.0-32.9,adult 04/26/2021     Priority: Medium     Recommended weight gain 11-20 lbs  Growth ultrasound in 3rd tri: ______       High-risk pregnancy in third trimester 04/26/2021     Priority: Medium       [facility]  Partner Name: Jared  Ultrasound predicts sex: boy  Childrens names/ages: Renny (7yo), Netta (4yo)  Previous labor experiences: Renny macrosomia, 2VC, retained placenta after Netta  Waterbirth (interest, declined, ineligible): n/a  Level of education/occupation:   Pertinent History: LGA 1st delivery   PP contraception: considering tubal ligation, plan to see MD Macario PPD/Depression/Anxiety:   Birth Ed:   Plans for labor pain management: planning unmedicated, did not use pain meds with first babies   Plans for post partum recovery/time off: Jared MACIAS will ask for time off to stay home  Feeding preference: breast  Breast pump: plans to get one  Peds provider:  Can't remember name, will look it up (it's who her older children see)  Car seat: has one  Circumcision: still deciding, will chat with   Discussed 2 week visit:   Tdap:   Flu:   Covid vaccine: Got second dose about 2 weeks ago          H/O macrosomia in infant in prior pregnancy, currently pregnant 03/16/2018     Priority: Medium     First baby 9lbs 11oz - no dystocia   Second baby WNL for weight - no dystocia        Tdap:9/17/21  Rhogam: not indicated     Patient Active Problem List   Diagnosis     H/O macrosomia in infant in prior pregnancy, currently pregnant     At risk for diabetes mellitus     BMI 32.0-32.9,adult     High-risk pregnancy in third trimester     Abnormal fetal ultrasound     Excessive fetal growth affecting management of pregnancy in third trimester     Obesity affecting pregnancy in third trimester     Encounter for triage in pregnant patient     Indication for care in labor or delivery       HISTORY  No Known  Allergies  Past Medical History:   Diagnosis Date     Abnormal Pap smear of cervix      Miscarriage 2012     Two vessel cord 2018     Past Surgical History:   Procedure Laterality Date     DILATION AND CURETTAGE      In [place] - Missed AB     DILATION AND CURETTAGE SUCTION WITH ULTRASOUND GUIDANCE N/A 2018    Procedure: DILATION AND CURETTAGE SUCTION WITH ULTRASOUND GUIDANCE;  Dilation and Curettage Suction with Ultrasound Guidance  ;  Surgeon: Celine Monahan MD;  Location: [facility] OR     Family History   Problem Relation Age of Onset     Family History Negative Mother      Family History Negative Father      Diabetes Paternal Grandfather 50     Cancer Paternal Grandmother      Social History     Tobacco Use     Smoking status: Never Smoker     Smokeless tobacco: Never Used   Substance Use Topics     Alcohol use: No     OB History    Para Term  AB Living   4 2 2 0 1 2   SAB TAB Ectopic Multiple Live Births   1 0 0 0 2      # Outcome Date GA Lbr Humphrey/2nd Weight Sex Delivery Anes PTL Lv   4 Current            3 Term 18 39w4d 02:50 / 00:02 3.93 kg (8 lb 10.6 oz) F Vag-Spont None N STEPHANIE      Name: Netta      Apgar1: 8  Apgar5: 9   2 Term 01/21/15 39w6d 07:35 / 00:27 4.394 kg (9 lb 11 oz) M Vag-Spont None  STEPHANIE      Birth Comments: Breast fed and bottle fed with breast milk      Name: Renny      Apgar1: 9  Apgar5: 9   1 SAB 03/10/12     AB, MISSED          LABS:  Lab Results   Component Value Date    ABO O 2021    RH Pos 2021    AS Neg 2021    HGB 10.6 (L) 2021    HEPBANG Nonreactive 2021    CHPCRT Negative 09/15/2017    GCPCRT Negative 09/15/2017    TREPAB Negative 2018       GBS Status:   Lab Results   Component Value Date    GBS Positive (A) 2018     Rubella: Immune    HIV: Non-Reactive   Platelets:  265  1hr GCT:  117    ROS   Pt is alert and oriented  Pt denies significant constitutional symptoms including fever and/or malaise.    Pt  denies significant respiratory, cardiovacular, GI, or muscular/skeletal complaints.    Neuro: Denies HA and visual changes  Muscoloskeletal: Denies except for discomforts r/t pregnancy     PHYSICAL EXAM:  /55   Temp 98.1  F (36.7  C) (Oral)   Resp 18   LMP 2021   General appearance:  healthy, alert and active   Heart: RRR  Lungs: CTA bilaterally, normal respiratory effort  Abdomen: gravid, single vertex fetus, non-tender, EFW 8-9 lbs.   Legs: reflexes 2+ bilaterally, no clonus, no edema     Contractions: Pt is anshu every 3-8 minutes, lasting 70 seconds and palpates mild    Fetal heart tones: Baseline 125   Variability: moderate   Accelerations: present  Decelerations: absent    NST: reactive    Cervix: 4.5/ 60%/ Mid/ average/ -2, Vtx  Bloody show: no  Membranes:  intact    ASSESSMENT:  26 year old  with ta IUP 38w1d in early labor  NST reactive  GBS negative and membranes intact    PLAN:  Routine CNM care  Labs ordered: Hemoglobin and type and screen  Teaching done r/t comfort measures, pain management options, and labor processes, and pt desires to avoid epidural as she did with her last babies   Admit - see IP orders  MD consultant on call [place]/ available prn  Anticipate     Rebecca Cristobal CNM

## 2021-10-10 NOTE — PLAN OF CARE
Pt requesting to walk, would like break from fetal monitoring at this time.  Pt agreeable to resume monitoring in 30 minutes.  Pt educated to stay on 4th floor and notify RN with any changes in UC's, LOF, or bleeding.  Will continue to monitor.

## 2021-10-10 NOTE — PLAN OF CARE
Data: Patient presented to Birthplace: 10/10/2021  2:49 AM.  Reason for maternal/fetal assessment is uterine contractions. Patient reports contractions starting around 2140 last evening, and getting stronger and closer together.  Patient is a .  Prenatal record reviewed. Pregnancy has been uncomplicated..  Gestational Age 38w1d. VSS. Fetal movement active. Patient denies leaking of vaginal fluid/rupture of membranes, vaginal bleeding, abdominal pain, pelvic pressure, nausea, vomiting, headache, visual disturbances, epigastric or URQ pain, significant edema. Support person is present.   Action: Verbal consent for EFM. Triage assessment completed. Bill of rights reviewed.  Response: Patient verbalized agreement with plan. Will contact WINSTON Cristobal with update and for further orders.    General: Alert and cooperative. No acute stress.  Head: Normocephalic, no mass or lesions.  Eyes: PERRL, clear conjunctiva, EOMI, no ptosis.   Neck: No lymphadenopathy, no JVD.   Respiratory: Mild bibasilar crackles. No wheezes or rhonchi.   Cardiovascular: S1/S2 auscultated. Regular rhythm. No murmurs, rubs or gallop.  Abdomen: Soft, non-tender, nondistended, no guarding or rebound tenderness.  Extremities: No significant deformity or joint abnormality. Peripheral pulses intact. No peripheral edema.  Skin: Intact, no rash.  Neurological: AOAx3. CN2-12 grossly intact.  Psychiatry: Oriented to person, place, and time. Able to demonstrate good judgement and reason, without hallucinations, abnormal affect or abnormal behaviors during the examination. Vital Signs Last 24 Hrs  T(C): 37 (12 Nov 2018 19:39), Max: 37.2 (12 Nov 2018 16:55)  T(F): 98.6 (12 Nov 2018 19:39), Max: 98.9 (12 Nov 2018 16:55)  HR: 106 (12 Nov 2018 19:39) (106 - 120)  BP: 132/82 (12 Nov 2018 19:39) (123/71 - 140/89)  BP(mean): --  RR: 16 (12 Nov 2018 19:39) (16 - 20)  SpO2: 99% (12 Nov 2018 19:39) (95% - 100%)    General: Alert and cooperative. No acute distress.  Head: Atraumatic/Normocephalic, no mass or lesions.  Eyes: PERRL, clear conjunctiva, EOMI, no ptosis.   Neck: No lymphadenopathy, no JVD.   Respiratory: Mild bibasilar crackles. No wheezes or rhonchi. Comfortable on RA   Cardiovascular: S1/S2 auscultated. Regular rhythm. No murmurs, rubs or gallop.  Abdomen: Soft, non-tender, nondistended, no guarding or rebound tenderness.  Extremities: No significant deformity or joint abnormality. Peripheral pulses intact. No peripheral edema. No clubbing or cyanosis.   Skin: Intact, no rash.  Neurological: AOAx3. CN2-12 grossly intact.  Psychiatry: Oriented to person, place, and time. Able to demonstrate good judgement and reason, without hallucinations, abnormal affect or abnormal behaviors during the examination.

## 2021-10-10 NOTE — PROGRESS NOTES
WINSTON PROGRESS NOTE    SUBJECTIVE:  Has been leaking clear fluid. Up walking in the room and in the halls.     OBJECTIVE:  /71   Temp 98.3  F (36.8  C) (Oral)   Resp 16   LMP 2021     Fetal heart tones: Baseline 128   Variability: moderate  Accelerations: present  Decelerations: present, intermittent variable    Contractions: Pt is anshu every 3 minutes, lasting 45-60 seconds and palpates moderate    Cervix: 5/ 75% / -2, Vtx  ROM: clear fluid    Pitocin- Starting now for augmentation  Antibiotics- none  Cervical ripening: N/A    ASSESSMENT:  IUP @ 38w1d early labor   GBS- negative  Slow progress   PLAN:   Discussed options with patient. Minimal cervical change since AROM. Patient agreeable to augmentation  Anticipate   Labor augmentation with Pitocin  reevaluate in 2-4 hours/PRN    GREGORIO BOWER CNM

## 2021-10-10 NOTE — PROGRESS NOTES
WINSTON PROGRESS NOTE    SUBJECTIVE:  States that contractions are not feeling very strong at this time. Pitocin has been running for ~ 2 hours.    OBJECTIVE:  /62   Temp 98.1  F (36.7  C) (Oral)   Resp 16   LMP 2021     Fetal heart tones: Baseline 128   Variability: moderate  Accelerations: present  Decelerations: present, just starting to see some early decelerations    Contractions: Pt is anshu every 1-3 minutes, lasting 45-60 seconds and palpates moderate    Cervix: Deferred  ROM: clear fluid    Pitocin- 5 mu/min.  Antibiotics- none  Cervical ripening: N/A    ASSESSMENT:  IUP @ 38w1d early labor   GBS- negative     PLAN:     Anticipate   Labor augmentation with Pitocin  reevaluate in 2-4 hours/PRN    GREGORIO BOWER CNM

## 2021-10-10 NOTE — TELEPHONE ENCOUNTER
Pt is having contraction every 7-8 minutes for over an hour     3rd pregnancy     On call Midwife Beck @ 12:55am    Midwife Rebecca Cristobal phoned back and advised that when contractions are 5  Minutes apart to head into L&D.    Phoned pt back and contractions are currently 5 min apart and  will be driving pt to [facility] L&D now.    Phoned [facility] L&D - spoke with nurse to give update about pt arriving soon.        Ivelisse Pérez RN  [facility] Nurse Advisor  12:55 AM 10/10/2021     COVID 19 Nurse Triage Plan/Patient Instructions    Please be aware that novel coronavirus (COVID-19) may be circulating in the community. If you develop symptoms such as fever, cough, or SOB or if you have concerns about the presence of another infection including coronavirus (COVID-19), please contact your health care provider or visit [email or web address].     Disposition/Instructions    In-Person Visit with provider recommended. Reference Visit Selection Guide.    Thank you for taking steps to prevent the spread of this virus.  o Limit your contact with others.  o Wear a simple mask to cover your cough.  o Wash your hands well and often.    Resources    [facility]: About COVID-19: [email or web address]    CDC: What to Do If You're Sick: www.cdc.gov/coronavirus/2019-ncov/about/steps-when-sick.html    CDC: Ending Home Isolation: www.cdc.gov/coronavirus/2019-ncov/hcp/disposition-in-home-patients.html     CDC: Caring for Someone: www.cdc.gov/coronavirus/2019-ncov/if-you-are-sick/care-for-someone.html     [facility]: Interim Guidance for Hospital Discharge to Home: www.health.[facility].[place].us/diseases/coronavirus/hcp/hospdischarge.pdf    [facility] clinical trials (COVID-19 research studies): [email or web address]     Below are the COVID-19 hotlines at the [place] Department of Health ([facility]). Interpreters are available.   o For health questions: Call 453-558-1658 or  "1-106.591.1880 (7 a.m. to 7 p.m.)  o For questions about schools and childcare: Call 067-068-4335 or 1-849.678.1031 (7 a.m. to 7 p.m.)                     Reason for Disposition    [1] History of prior delivery (multipara) AND [2] contractions < 10 minutes apart AND [3] present 1 hour    Additional Information    Negative: Passed out (i.e., lost consciousness, collapsed and was not responding)    Negative: Shock suspected (e.g., cold/pale/clammy skin, too weak to stand, low BP, rapid pulse)    Negative: Difficult to awaken or acting confused (e.g., disoriented, slurred speech)    Negative: [1] SEVERE abdominal pain (e.g., excruciating) AND [2] constant AND [3] present > 1 hour    Negative: Severe bleeding (e.g., continuous red blood from vagina, or large blood clots)    Negative: Umbilical cord hanging out of the vagina (shiny, white, curled appearance, \"like telephone cord\")    Negative: Uncontrollable urge to push (i.e., feels like baby is coming out now)    Negative: Can see baby    Negative: Sounds like a life-threatening emergency to the triager    Negative: Pregnant < 37 weeks (i.e., )    Negative: [1] Uncertain delivery date AND [2] possibly pregnant < 37 weeks (i.e., )    Negative: [1] First baby (primipara) AND [2] contractions < 6 minutes apart  AND [3] present 2 hours    Protocols used: PREGNANCY - LABOR-A-AH      "

## 2021-10-10 NOTE — PROGRESS NOTES
WINSOTN PROGRESS NOTE    SUBJECTIVE:  Marie has been up walking the halls and in her room for the past couple of hours. Feels that her contractions are getting a little stronger.    OBJECTIVE:  /55   Temp 98.1  F (36.7  C) (Oral)   Resp 18   LMP 2021     Fetal heart tones: Baseline 130   Variability: moderate  Accelerations: present  Decelerations: absent    Contractions: Pt is anshu every 1-5 minutes, lasting 30-45 seconds and palpates moderate    Cervix: 4.5/ 50%/ -2, Vtx  ROM: not ruptured    Pitocin- none  Antibiotics- none  Cervical ripening: N/A    ASSESSMENT:  IUP @ 38w1d early labor   GBS- negative     PLAN:   No change since previous cervical exam.  Discussed options, including AROM, pitocin augmentation, and expectant management. Patient prefers to consider options and will let us know if she decides on intervention at this time.  Will evaluate again in two hours. If no change, will recommend augmentation or AROM  Anticipate   reevaluate in 2 hours/PRN    GREGORIO BOWER CNM

## 2021-10-10 NOTE — PLAN OF CARE
Discussed POC with pt and sig other.  Options include rechecking SVE now, or walking more and rechecking in 30 minutes.  Pt desires to walk more and then recheck SVE.  Plan to removed monitors and recheck SVE at 0530.  Will continue to monitor.

## 2021-10-10 NOTE — PROGRESS NOTES
WINSTON PROGRESS NOTE    SUBJECTIVE:  Contractions much more uncomfortable    OBJECTIVE:  /62   Temp 98.2  F (36.8  C) (Oral)   Resp 16   LMP 2021     Fetal heart tones: Baseline 130   Variability: moderate  Accelerations: present  Decelerations: present, intermittent early decels    Contractions: Pt is anshu every 2-4 minutes, lasting 45-60 seconds and palpates strong    Cervix: 8.5/ 70% / -1, Vtx  ROM: clear fluid    Pitocin- 5 mu/min.  Antibiotics- none  Cervical ripening: N/A    ASSESSMENT:  IUP @ 38w1d active labor   GBS- negative     PLAN:     Anticipate   Labor augmentation with Pitocin  reevaluate PRN    GREGORIO BOWER CNM

## 2021-10-10 NOTE — PROVIDER NOTIFICATION
10/10/21 0322   Provider Notification   Provider Name/Title Rebecca Cristobal CNM   Method of Notification Phone   Request Evaluate - Remote   Notification Reason Patient Arrived;SVE   CNM Rebecca Cristobal informed of patient arrival and assessment including the following:    Reason for maternal/fetal assessment uterine contractions. Pt reports contractions starting around 2140 last evening and becoming more frequent and stronger. Fetal status with moderate variability, no accelerations, no decelerations. Plan per provider/orders received for pt to walk after reactive NST, recheck SVE in 2 hours.  Will update pt on POC and continue to monitor.

## 2021-10-11 VITALS
TEMPERATURE: 98.3 F | SYSTOLIC BLOOD PRESSURE: 94 MMHG | HEART RATE: 67 BPM | RESPIRATION RATE: 18 BRPM | DIASTOLIC BLOOD PRESSURE: 58 MMHG

## 2021-10-11 PROBLEM — Z36.89 ENCOUNTER FOR TRIAGE IN PREGNANT PATIENT: Status: RESOLVED | Noted: 2021-10-10 | Resolved: 2021-10-11

## 2021-10-11 PROCEDURE — 250N000013 HC RX MED GY IP 250 OP 250 PS 637: Performed by: ADVANCED PRACTICE MIDWIFE

## 2021-10-11 PROCEDURE — 90686 IIV4 VACC NO PRSV 0.5 ML IM: CPT | Performed by: ADVANCED PRACTICE MIDWIFE

## 2021-10-11 PROCEDURE — G0008 ADMIN INFLUENZA VIRUS VAC: HCPCS | Performed by: ADVANCED PRACTICE MIDWIFE

## 2021-10-11 PROCEDURE — 250N000011 HC RX IP 250 OP 636: Performed by: ADVANCED PRACTICE MIDWIFE

## 2021-10-11 RX ORDER — DOCUSATE SODIUM 100 MG/1
100 CAPSULE, LIQUID FILLED ORAL DAILY
COMMUNITY
Start: 2021-10-11 | End: 2023-11-09

## 2021-10-11 RX ORDER — MODIFIED LANOLIN
OINTMENT (GRAM) TOPICAL
COMMUNITY
Start: 2021-10-11 | End: 2023-11-09

## 2021-10-11 RX ORDER — IBUPROFEN 800 MG/1
800 TABLET, FILM COATED ORAL EVERY 6 HOURS PRN
Qty: 90 TABLET | Refills: 0 | Status: SHIPPED | OUTPATIENT
Start: 2021-10-11 | End: 2023-11-09

## 2021-10-11 RX ORDER — ACETAMINOPHEN 325 MG/1
650 TABLET ORAL EVERY 4 HOURS PRN
COMMUNITY
Start: 2021-10-11 | End: 2023-11-09

## 2021-10-11 RX ADMIN — IBUPROFEN 800 MG: 800 TABLET, FILM COATED ORAL at 07:28

## 2021-10-11 RX ADMIN — IBUPROFEN 800 MG: 800 TABLET, FILM COATED ORAL at 14:04

## 2021-10-11 RX ADMIN — ACETAMINOPHEN 650 MG: 325 TABLET, FILM COATED ORAL at 00:25

## 2021-10-11 RX ADMIN — DOCUSATE SODIUM 100 MG: 100 CAPSULE, LIQUID FILLED ORAL at 17:28

## 2021-10-11 RX ADMIN — ACETAMINOPHEN 650 MG: 325 TABLET, FILM COATED ORAL at 17:27

## 2021-10-11 RX ADMIN — INFLUENZA A VIRUS A/VICTORIA/2570/2019 IVR-215 (H1N1) ANTIGEN (FORMALDEHYDE INACTIVATED), INFLUENZA A VIRUS A/TASMANIA/503/2020 IVR-221 (H3N2) ANTIGEN (FORMALDEHYDE INACTIVATED), INFLUENZA B VIRUS B/PHUKET/3073/2013 ANTIGEN (FORMALDEHYDE INACTIVATED), AND INFLUENZA B VIRUS B/WASHINGTON/02/2019 ANTIGEN (FORMALDEHYDE INACTIVATED) 0.5 ML: 15; 15; 15; 15 INJECTION, SUSPENSION INTRAMUSCULAR at 19:32

## 2021-10-11 NOTE — PLAN OF CARE
Data: Vital signs stable, assessments within normal limits.   Discharge outcomes on care plan met.   No apparent pain.  Pt requested Flu shot before discharge and plans to give it to pt this shift.   Response: Mother states understanding and comfort with infant cares and feeding. All questions addressed. Pt discharged with baby at 2040.

## 2021-10-11 NOTE — PLAN OF CARE
Data: Marie Matos transferred to [id number] via wheelchair at 2100. Baby transferred via parent's arms.  Action: Receiving unit notified of transfer: Yes. Patient and family notified of room change. Report given to Flaca at 2100. Belongings sent to receiving unit. Accompanied by Registered Nurse. Oriented patient to surroundings. Call light within reach. ID bands double-checked with receiving RN.  Response: Patient tolerated transfer and is stable.

## 2021-10-11 NOTE — DISCHARGE SUMMARY
[facility] Discharge Summary    Marie Matos MRN# [id number]   Age: 26 year old YOB: 1995     Date of Admission:  10/10/2021  Date of Discharge::  10/11/2021  Admitting Physician:  Rebecca Cristobal CNM  Discharge Physician:  SADIE Healy CNM     Home clinic: [facility]          Admission Diagnoses:   Encounter for triage in pregnant patient [Z36.89]  Indication for care in labor or delivery [O75.9]          Discharge Diagnosis:   Normal spontaneous vaginal delivery  Intrauterine pregnancy at 38 1/7 weeks gestation          Procedures:   Procedure(s): No additional procedures performed       No procedures performed during this admission           Medications Prior to Admission:     Medications Prior to Admission   Medication Sig Dispense Refill Last Dose     Prenatal Vit-Fe Fumarate-FA (PRENATAL MULTIVITAMIN PLUS IRON) 27-0.8 MG TABS per tablet Take 1 tablet by mouth daily 100 tablet 3 10/9/2021 at Unknown time     [DISCONTINUED] ferrous sulfate (FEROSUL) 325 (65 Fe) MG tablet Take 1 tablet (325 mg) by mouth every other day 45 tablet 1 10/9/2021 at Unknown time             Discharge Medications:     Current Discharge Medication List      START taking these medications    Details   acetaminophen (TYLENOL) 325 MG tablet Take 2 tablets (650 mg) by mouth every 4 hours as needed for mild pain or fever (greater than or equal to 38  C /100.4  F (oral) or 38.5  C/ 101.4  F (core).)    Associated Diagnoses:  (normal spontaneous vaginal delivery)      benzocaine (AMERICAINE) 20 % external aerosol Use as directed on package    Associated Diagnoses:  (normal spontaneous vaginal delivery)      docusate sodium (COLACE) 100 MG capsule Take 1 capsule (100 mg) by mouth daily    Associated Diagnoses:  (normal spontaneous vaginal delivery)      ibuprofen (ADVIL/MOTRIN) 800 MG tablet Take 1 tablet (800 mg) by mouth every 6 hours as needed for other (cramping)  Qty: 90  tablet, Refills: 0    Associated Diagnoses:  (normal spontaneous vaginal delivery)      lanolin ointment Apply topically every hour as needed for other (sore nipples)    Associated Diagnoses: Lactating mother         CONTINUE these medications which have NOT CHANGED    Details   Prenatal Vit-Fe Fumarate-FA (PRENATAL MULTIVITAMIN PLUS IRON) 27-0.8 MG TABS per tablet Take 1 tablet by mouth daily  Qty: 100 tablet, Refills: 3    Associated Diagnoses: Encounter for supervision of other normal pregnancy in first trimester         STOP taking these medications       ferrous sulfate (FEROSUL) 325 (65 Fe) MG tablet Comments:   Reason for Stopping:                     Consultations:   No consultations were requested during this admission          Brief History of Labor:   Marie Matos arrived in early labor and eventually utilized pitocin for labor augmentation.  She progressed well.  She delivered a healthy male infant and did not have a perineal laceration or repair.  EBL WNL.             Hospital Course:   The patient's hospital course was unremarkable.  On discharge, her pain was well controlled. Vaginal bleeding is similar to peak menstrual flow.  Voiding without difficulty.  Ambulating well and tolerating a normal diet.  No fever.  Breastfeeding well.  Infant is stable.  No bowel movement yet. She was discharged on post-partum day #1.    Post-partum hemoglobin:   Hemoglobin   Date Value Ref Range Status   10/10/2021 10.6 (L) 11.7 - 15.7 g/dL Final   2021 12.7 11.7 - 15.7 g/dL Final             Discharge Instructions and Follow-Up:   Discharge diet: Regular   Discharge activity: Activity as tolerated   Discharge follow-up: Follow up with primary care provider in 2 and 6  weeks   Wound care: n/a           Discharge Disposition:   Discharged to home      Attestation:  I have reviewed today's vital signs, notes, medications, labs and imaging.    SADIE Healy CNM

## 2021-10-11 NOTE — DISCHARGE INSTRUCTIONS
Follow up with provider in 2 weeks and 6 weeks for post-delivery checks         Postpartum Vaginal Delivery Instructions    Activity       Ask family and friends for help when you need it.    Do not place anything in your vagina for 6 weeks.    You are not restricted on other activities, but take it easy for a few weeks to allow your body to recover from delivery.  You are able to do any activities you feel up to that point.    No driving until you have stopped taking your pain medications (usually two weeks after delivery).     Call your health care provider if you have any of these symptoms:       Increased pain, swelling, redness, or fluid around your stiches from an episiotomy or perineal tear.    A fever above 100.4 F (38 C) with or without chills when placing a thermometer under your tongue.    You soak a sanitary pad with blood within 1 hour, or you see blood clots larger than a golf ball.    Bleeding that lasts more than 6 weeks.    Vaginal discharge that smells bad.    Severe pain, cramping or tenderness in your lower belly area.    A need to urinate more frequently (use the toilet more often), more urgently (use the toilet very quickly), or it burns when you urinate.    Nausea and vomiting.    Redness, swelling or pain around a vein in your leg.    Problems breastfeeding or a red or painful area on your breast.    Chest pain and cough or are gasping for air.    Problems coping with sadness, anxiety, or depression.  If you have any concerns about hurting yourself or the baby, call your provider immediately.     You have questions or concerns after you return home.     Keep your hands clean:  Always wash your hands before touching your perineal area and stitches.  This helps reduce your risk of infection.  If your hands aren't dirty, you may use an alcohol hand-rub to clean your hands. Keep your nails clean and short.    Vaginal Delivery Discharge Instructions: [place]  Actividad:     Pida a los miembros de  montes leah y amigos que la ayuden cuando lo necesite.    No ponga nada en montes vagina hasta que montes médico lo permita.    Tómese las próximas semanas con calma para que montes cuerpo tenga tiempo de recuperarse. En adriana momento puede hacer cualquier actividad que sienta que puede.    No conduzca si está tomando píldoras para el dolor recetadas por montes médico. Puede conducir si está tomando píldoras de venta julia para el dolor.    Llame a montes proveedor de atención médica si tiene alguno de estos síntomas:    Empapa edis toalla femenina con dylon en el correr de 1 hora o ve coágulos más grandes que edis pelota de golf.    Sangrado que dura más de 6 semanas.    Tiene edis secreción vaginal que huele mal.     Fiebre de 100.4  F (38  C) o más (temperatura tomada bajo montes lengua) con o sin escalofríos     Dolor, calambres o sensibilidad graves en la región inferior de montes vientre.    Aumento del dolor, hinchazón, enrojecimiento o líquido alrededor de janiya puntos.    Necesidad más frecuente o urgente de orinar (hacer pis), o ardor al hacerlo.    Enrojecimiento, hinchazón o dolor alrededor de edis vena en montes pierna.    Problemas para amamantar o un área enrojecida o dolorosa en montes pecho.    Dolor que aumenta o no se va de edis episiotomía o desgarro en el perineo.    Náuseas y vómitos    Dolor en el pecho y tos o dificultad para respirar.    Problemas para manejar la tristeza, ansiedad o depresión.     Si le preocupa hacerse daño o hacerle daño al bebé, llame al médico de inmediato.     Tiene preguntas o inquietudes después de regresar a casa.    Mantenga janiya lesa limpias:  Lávese siempre las lesa antes de tocar el área de montes perineo y los puntos.  [place] ayuda a reducir montes riesgo de infección.  Si janiya lesa no están sucias, puede usar un gel de alcohol para limpiarse las lesa. Mantenga janiya uñas cortas y limpias.      Vaginal Delivery Discharge Instructions  Activity:     Ask family and friends for help when you need it.    Do not place  anything in your vagina until your doctor approves.    Take it easy for the next few weeks to allow your body to recover. You may do any activities you feel up to at that point.    Do not drive while taking pain pills prescribed by your doctor. You may drive if taking over-the-counter pain pills.    Call your health care provider if you have any of these symptoms:    You soak a sanitary pad with blood within 1 hour, or you see blood clots larger than a golf ball.    Bleeding that lasts more than 6 weeks.    You have vaginal discharge that smells bad.     A fever of 100.4  F (38  C) or higher (temperature taken under your tongue), with or without chills     Severe, pain, cramping or tenderness in your lower belly area.    Increased pain, swelling, redness or fluid around your stitches.    A more frequent or urgent need to urinate (pee), or it burns when you pee.    Redness, swelling or pain around a vein in your leg.    Problems breastfeeding, or a red or painful area on your breast.    Pain that increases or does not go away from an episiotomy or perineal tear.    Nausea and vomiting.    Chest pain and cough or are gasping for air.    Problems coping with sadness, anxiety, or depression.     If you have any concerns about hurting yourself or the baby, call your doctor right away.     You have questions or concerns after you return home.    Keep your hands clean:  Always wash your hands before touching your perineal area and stitches.  This helps reduce your risk of infection.  If your hands aren t dirty, you may use an alcohol hand-rub to clean your hands. Keep your nails clean and short.

## 2021-10-11 NOTE — PLAN OF CARE
Pt meeting all expected goals. Occ cramping controlled with PRN ibuprofen. Independent with cares. SO at bedside--supportive and involved. Breastfeeding is going well per pt. Potential evening discharge tonight.

## 2021-10-11 NOTE — PROGRESS NOTES
CNM Postpartum Discharge Note    SIGNIFICANT PROBLEMS:  Patient Active Problem List    Diagnosis Date Noted     Obesity affecting pregnancy in third trimester 10/08/2021     Priority: Medium     Excessive fetal growth affecting management of pregnancy in third trimester 08/03/2021     Priority: Medium     95% on 27 week sono.  MFM referral sent   34wk US EFW 89% with normal fluid       Abnormal fetal ultrasound 08/01/2021     Priority: Medium     At risk for diabetes mellitus 04/26/2021     Priority: Medium     High BMI, at risk ethnicity       BMI 32.0-32.9,adult 04/26/2021     Priority: Medium     Recommended weight gain 11-20 lbs  Growth ultrasound in 3rd tri: ______       High-risk pregnancy in third trimester 04/26/2021     Priority: Medium       [facility]  Partner Name: Jared  Ultrasound predicts sex: boy  Childrens names/ages: Renny (5yo), Netta (4yo)  Previous labor experiences: Renny macrosomia, 2VC, retained placenta after Netta  Waterbirth (interest, declined, ineligible): n/a  Level of education/occupation:   Pertinent History: LGA 1st delivery   PP contraception: considering tubal ligation, plan to see MD  Hx PPD/Depression/Anxiety:   Birth Ed:   Plans for labor pain management: planning unmedicated, did not use pain meds with first babies   Plans for post partum recovery/time off: Jared MACIAS will ask for time off to stay home  Feeding preference: breast  Breast pump: plans to get one  Peds provider:  Can't remember name, will look it up (it's who her older children see)  Car seat: has one  Circumcision: still deciding, will chat with   Discussed 2 week visit:   Tdap:   Flu:   Covid vaccine: Got second dose about 2 weeks ago          H/O macrosomia in infant in prior pregnancy, currently pregnant 03/16/2018     Priority: Medium     First baby 9lbs 11oz - no dystocia   Second baby WNL for weight - no dystocia            SUBJECTIVE:  Patient is stable and is tolerating  acitivity well  Baby is rooming in  Complications since 2 hours post delivery: None  Pain is well controlled.  Patient is taking pain medications.  Breastfeeding status:initiated and well established   Elimination:  She is voiding without difficulty.  She has not had a bowel movement  Desired contraception Vasectomy  Denies heavy bleeding and passing large clots.  She would like a breast pump prior to discharge.  RN to help with that.  Denies hx of anxiety, depression or PPD/A  She is a SAHM and Leobardo will have one week off and his mother will stay with them a week as well.      INTERVAL HISTORY:  /58   Pulse 71   Temp 98.1  F (36.7  C) (Oral)   Resp 16   LMP 01/16/2021   Breastfeeding Unknown     Constitutional: healthy, alert, no distress and cooperative    Breasts: Currently breastfeeding    Fundus: Uterine fundus is firm, non-tender and at or below the level of the umbilicus per RN check    Perineum: Not examined.  Feeling well and no repair.    Lochia: Lochia is appropriate for the duration of time since delivery.     Postpartum hemoglobin   Hemoglobin   Date Value Ref Range Status   10/10/2021 10.6 (L) 11.7 - 15.7 g/dL Final   04/06/2021 12.7 11.7 - 15.7 g/dL Final     Blood type   Lab Results   Component Value Date    ABO O 04/23/2021       Lab Results   Component Value Date    RH Pos 04/23/2021     Rubella status No results found for: RUBELLAABIGG  History of depression:  no    ASSESSMENT/PLAN:  Normal postpartum course  Stable Post-partum day #1  Complications:none  Postpartum warning s/s reviewed, including bleeding/clots, fever, mastitis & thromboemboli   Exercise, diet and rest reviewed  PP Danilo/cuong reviewed  Continue prenatal vitamins while breastfeeding  Birthcontrol planned:Vasectomy  Educated on postpartum blues and postpartum depression warnings signs/symptoms  Follow-up in 2 and 6 weeks with CNMs at [facility]  Plan d/c home today    Current Discharge Medication List       CONTINUE these medications which have NOT CHANGED    Details   ferrous sulfate (FEROSUL) 325 (65 Fe) MG tablet Take 1 tablet (325 mg) by mouth every other day  Qty: 45 tablet, Refills: 1    Associated Diagnoses: Anemia      Prenatal Vit-Fe Fumarate-FA (PRENATAL MULTIVITAMIN PLUS IRON) 27-0.8 MG TABS per tablet Take 1 tablet by mouth daily  Qty: 100 tablet, Refills: 3    Associated Diagnoses: Encounter for supervision of other normal pregnancy in first trimester             SADIE Healy CNM

## 2021-10-12 NOTE — LACTATION NOTE
This note was copied from a baby's chart.  Brief lactation visit. Mother is expecting to discharge this evening. Made aware of lactation availability, no questions or concerns at this time. Interpretation of visit done through support person at bedside.

## 2021-11-12 ENCOUNTER — MEDICAL CORRESPONDENCE (OUTPATIENT)
Dept: HEALTH INFORMATION MANAGEMENT | Facility: CLINIC | Age: 26
End: 2021-11-12
Payer: COMMERCIAL

## 2021-11-21 NOTE — PROGRESS NOTES
Midwife Postpartum 6 Week Visit- TELEPHONE VISIT     Telephone visit with  on the line.      Marie Matos is a 26 year presenting via telephone for a postpartum checkup.     Delivery date was 10/10/21. She had a  of a viable boy, weight 8 pounds 13.5 oz., with none complications . No lacerations.     Since delivery, she has been breast feeding. It is going well and baby boy has been doing great, gaining weight well. She has not had any signs of infection, her lochia stopped after 4 weeks.  She has not had other complications.     She is voiding and having bowel movements without difficulty. Not using and stool softeners/      Contraception was discussed. Had a consult and signed a consent for a postpartum tubal but has decided against it. Has used Depo previously but caused many side effects, she did not like how she felt on it and her bleeding was very irregular. Was having a lot of spotting. Considering Nexplanon.     When she is not using birth control her cycle is regular, every 28-30 days, lasting 4 days. Describes her bleeding as light. Does not experience much cramping.     Mood is Stable.   Patient screened for postpartum depression and denies.     ROS:  CONSTITUTIONAL: NEGATIVE for fever, chills, change in weight  INTEGUMENTARU/SKIN: NEGATIVE for worrisome rashes, moles or lesions  EYES: NEGATIVE for vision changes or irritation  ENT: NEGATIVE for ear, mouth and throat problems  RESP: NEGATIVE for significant cough or SOB  BREAST: NEGATIVE for masses, tenderness or discharge  CV: NEGATIVE for chest pain, palpitations or peripheral edema  GI: NEGATIVE for nausea, abdominal pain, heartburn, or change in bowel habits  : NEGATIVE for unusual urinary or vaginal symptoms. Periods are regular.  MUSCULOSKELETAL: NEGATIVE for significant arthralgias or myalgia  NEURO: NEGATIVE for weakness, dizziness or paresthesias  PSYCHIATRIC: NEGATIVE for changes in mood or affect      Current  Outpatient Medications:      Prenatal Vit-Fe Fumarate-FA (PRENATAL MULTIVITAMIN PLUS IRON) 27-0.8 MG TABS per tablet, Take 1 tablet by mouth daily, Disp: 100 tablet, Rfl: 3     acetaminophen (TYLENOL) 325 MG tablet, Take 2 tablets (650 mg) by mouth every 4 hours as needed for mild pain or fever (greater than or equal to 38  C /100.4  F (oral) or 38.5  C/ 101.4  F (core).) (Patient not taking: Reported on 2021), Disp: , Rfl:      benzocaine (AMERICAINE) 20 % external aerosol, Use as directed on package (Patient not taking: Reported on 2021), Disp: , Rfl:      docusate sodium (COLACE) 100 MG capsule, Take 1 capsule (100 mg) by mouth daily (Patient not taking: Reported on 2021), Disp: , Rfl:      ibuprofen (ADVIL/MOTRIN) 800 MG tablet, Take 1 tablet (800 mg) by mouth every 6 hours as needed for other (cramping) (Patient not taking: Reported on 2021), Disp: 90 tablet, Rfl: 0     lanolin ointment, Apply topically every hour as needed for other (sore nipples) (Patient not taking: Reported on 2021), Disp: , Rfl: .   OB History    Para Term  AB Living   4 3 3 0 1 3   SAB IAB Ectopic Multiple Live Births   1 0 0 0 3      # Outcome Date GA Lbr Humphrey/2nd Weight Sex Delivery Anes PTL Lv   4 Term 10/10/21 38w1d 05:00 / 00:05 4.01 kg (8 lb 13.5 oz) M Vag-Spont None  STEPHANIE      Name: KAM,MALE-KIM      Apgar1: 8  Apgar5: 9   3 Term 18 39w4d 02:50 / 00:02 3.93 kg (8 lb 10.6 oz) F Vag-Spont None N STEPHANIE      Name: Netta      Apgar1: 8  Apgar5: 9   2 Term 01/21/15 39w6d 07:35 / 00:27 4.394 kg (9 lb 11 oz) M Vag-Spont None  STEPHANIE      Birth Comments: Breast fed and bottle fed with breast milk      Name: Renny      Apgar1: 9  Apgar5: 9   1 SAB 03/10/12     AB, MISSED        Last pap:    Lab Results   Component Value Date    PAP NIL 2021     Hgb in hospital was 10.6. QBL of 400ml    EXAM:  LMP 2021   BMI: There is no height or weight on file to calculate  "BMI.  Exam:  Psychiatric: affect normal/bright  TELEPHONE VISIT   PELVIC EXAM:  TELEPHONE VISIT       ASSESSMENT:   Normal postpartum exam after .    ICD-10-CM    1. Routine postpartum follow-up  Z39.2    2. Encounter for counseling regarding contraception  Z30.09          PLAN:  No results found for any visits on 21.    Return for scheduled Paraguard insertion with WINSTON Vizcarra on 12/3/21  Teaching: exercise, birth control and mental health  Family Planning: discussed possible bleeding profile on Nexplanon and patient decided against this method. Interested in a hormone free option. Discussed Paraguard and patient interested in this. Reviewed risks versus benefits, review again at insertion visit.   Encourage Kegels and abdominal exercise. Discussed how to do Kegel exercises and encouraged to pair a set of Kegels with each breastfeeding session.   Continue a multivitamin/prenatal supplement, especially if breastfeeding- currently still taking prenatal while breastfeeding.   Pap smear was not obtained today. There was confusion whether or not patient has a history of abnormal pap and therefore what the proper follow-up PAP schedule should be. For that reason her PAP follow-up was changed to one year. Appears \"history of abnormal pap\" was added to patient diagnosis list in error at some time (has since been resolved from problem list). She has had two PAPs in her life, 9/15/2017 and 2021, both NILM. Will change follow-up back to recommended 3 year due date of 2024.   Postpartum Hgb was not done today.    GDM:  Fasting and 2hr GCT needed:  Zahira          Marie Matos is a 26 year old female who is being evaluated via a billable telephone visit.      How would you like to obtain your AVS? Parish      Marie Matos is a 26 year old female who presents for virtual visit today for the following health issue(s):  Patient presents with:  Telephone      Additional information: Telephone postpartum visit as " noted above     This phone encounter lasted a total of 22 min.      SADIE CondeM

## 2021-11-22 ENCOUNTER — VIRTUAL VISIT (OUTPATIENT)
Dept: OBGYN | Facility: CLINIC | Age: 26
End: 2021-11-22
Payer: COMMERCIAL

## 2021-11-22 DIAGNOSIS — Z30.09 ENCOUNTER FOR COUNSELING REGARDING CONTRACEPTION: ICD-10-CM

## 2021-11-22 PROBLEM — O99.213 OBESITY AFFECTING PREGNANCY IN THIRD TRIMESTER: Status: RESOLVED | Noted: 2021-10-08 | Resolved: 2021-11-22

## 2021-11-22 PROBLEM — O09.93 HIGH-RISK PREGNANCY IN THIRD TRIMESTER: Status: RESOLVED | Noted: 2021-04-26 | Resolved: 2021-11-22

## 2021-11-22 PROBLEM — O36.63X0 EXCESSIVE FETAL GROWTH AFFECTING MANAGEMENT OF PREGNANCY IN THIRD TRIMESTER: Status: RESOLVED | Noted: 2021-08-03 | Resolved: 2021-11-22

## 2021-11-22 PROBLEM — O09.299 H/O MACROSOMIA IN INFANT IN PRIOR PREGNANCY, CURRENTLY PREGNANT: Status: RESOLVED | Noted: 2018-03-16 | Resolved: 2021-11-22

## 2021-11-22 PROCEDURE — 99207 PR POST PARTUM EXAM: CPT

## 2021-11-22 NOTE — PATIENT INSTRUCTIONS
Patient Education     Birth Control: IUD (Intrauterine Device)    The IUD (intrauterine device) is small, flexible, and T-shaped. A trained healthcare provider places it in the uterus. The IUD is one of the most effective birth control methods. It's also reversible. This means it can be removed at any time by a trained healthcare provider. New IUDs are safe and don't have the risks of older types of IUDs.   Pregnancy rates  Talk to your healthcare provider about the effectiveness of this birth control method.  Types of IUDs  IUD insertion is done in the healthcare provider s office. Two types of IUDs are available:    The copper IUD releases a small amount of copper into the uterus. The copper makes it harder for sperm to reach the egg. The device works for at least 10 years.    The progestin IUD releases a hormone called progestin. It causes changes in the uterus to help prevent pregnancy. The device works for 3 to 5 years, depending on which device is chosen. It may be recommended for women who have anemia or heavy and painful periods.  IUDs have thin strings that hang from the opening of the uterus into the vagina. This lets you check that the IUD stays in place.   Things to know about IUDs    IUDs can be used by women who have never been pregnant or by women with a history of sexually transmitted infections (STIs) or tubal pregnancy.    It won't move from the uterus to any other part of the body.    There is a slight risk of the device coming out of the vagina (expulsion).    It may not work in women who have an abnormally shaped uterus.    A copper IUD may cause heavier periods and cramping.    Progestin IUD may cause light periods or no periods at all (irregular bleeding or spotting is possible and normal during first 3 to 6 months).    If you get a sexually transmitted infection with an IUD in place, symptoms may be more severe.    What to report to your healthcare provider  Be sure your healthcare provider  knows if you have:    A sexually transmitted infection (STI) or possible STI    Liver problems    Blood clots (for progestin IUD only)    Breast cancer or a history of breast cancer (progestin IUD only)  [employer] last reviewed this educational content on 5/1/2020 2000-2021 [employer]. All rights reserved. This information is not intended as a substitute for professional medical care. Always follow your healthcare professional's instructions.           Patient Education     Control de la natalidad: El dispositivo intrauterino    El dispositivo intrauterino (abreviado DIU) es un aparato pequeño y flexible con forma de T que un proveedor de atención médica capacitado coloca dentro del útero. El DIU se cuenta entre los métodos anticonceptivos más eficaces y además es reversible, lo que significa que puede ser extraído en cualquier momento por un proveedor de atención médica capacitado. Los nuevos DIU son seguros y no acarrean los riesgos asociados a los DIU más antiguos.  Tasa de embarazo  Hable con montes proveedor de atención médica acerca de la eficacia de jayesh método de control de la natalidad.  Tipos de DIU  Hay dos tipos de DIU disponibles:    El DIU de cobre: libera edis pequeña cantidad de cobre en el útero, lo cual dificulta que los espermatozoides lleguen hasta el óvulo. Jayesh dispositivo es efectivo nan mínimo mickie 10 años.    El DIU con progestágeno: libera la hormona progestágeno (progesterona sintética), la cual ocasiona cambios en el útero que ayudan a prevenir el embarazo. Jayesh dispositivo es efectivo nan mínimo mickie 5 años y podría recomendarse a mujeres que tienen anemia o menstruaciones abundantes o dolorosas.  Los DIU tienen unos pequeños hilos que cuelgan de la abertura del útero hacia la vagina, los cuales permiten comprobar que el dispositivo permanezca en montes posición.  Lo que debe saber sobre los DIU    Pueden usarlos mujeres que nunca francis estado embarazadas o que tienen  antecedentes de ITS o de embarazos tubáricos.    No se desplazarán del útero a ninguna otra parte del cuerpo.    Acarrean un ligero riesgo de ser expulsados por la vagina.    Podrían no funcionar en mujeres que tienen un útero de forma anormal.    El DIU de cobre puede aumentar el flujo menstrual y causar cólicos.    El DIU que contiene progestágeno puede reducir el flujo menstrual o hacer que desaparezcan las menstruaciones (es posible y normal que se produzca manchado mickie los primeros 3 meses).  Asegúrese de que montes proveedor de atención médica sepa si usted     Tiene edis ITS o podría tenerla.    Tiene problemas del hígado.    Tiene coágulos de dylon (sólo en el bebeto del DIU que contiene progestágeno).    Tiene cáncer de seno o antecedentes de cáncer de seno (sólo en el bebeto del DIU que contiene progestágeno).     [id number] [employer]. Todos los derechos reservados. Esta información no pretende sustituir la atención médica profesional. Sólo montes médico puede diagnosticar y tratar un problema de evangelina.

## 2021-12-17 ENCOUNTER — MEDICAL CORRESPONDENCE (OUTPATIENT)
Dept: HEALTH INFORMATION MANAGEMENT | Facility: CLINIC | Age: 26
End: 2021-12-17
Payer: COMMERCIAL

## 2022-04-01 ENCOUNTER — PATIENT OUTREACH (OUTPATIENT)
Dept: INTERNAL MEDICINE | Facility: CLINIC | Age: 27
End: 2022-04-01
Payer: COMMERCIAL

## 2022-04-01 DIAGNOSIS — R87.619 ABNORMAL PAP SMEAR OF CERVIX: ICD-10-CM

## 2022-04-26 ENCOUNTER — MEDICAL CORRESPONDENCE (OUTPATIENT)
Dept: HEALTH INFORMATION MANAGEMENT | Facility: CLINIC | Age: 27
End: 2022-04-26
Payer: COMMERCIAL

## 2022-05-03 NOTE — TELEPHONE ENCOUNTER
Patient due for Pap.    Reminder done today via telephone call with aid of Polish speaking  ID #[id number]. Unable to LM as VM is not set up. Will call again another time.

## 2022-05-11 NOTE — TELEPHONE ENCOUNTER
Patient due for Pap.    Reminder done today via telephone call - with aid of Czech speaking  ID#[id number], patient notified and was given phone number for scheduling per her request.

## 2022-06-13 NOTE — TELEPHONE ENCOUNTER
FYI to provider - Patient is lost to pap tracking follow-up. Attempts to contact pt have been made per reminder process and there has been no reply and/or no appt scheduled.       9/2017 NIL pap  4/23/21 NIL pap. Plan per provider: pap in 1 year (if records are obtained sooner, may adjust as needed)  04/1/22 Reminder Mychart  05/03/22 Reminder call with aid of Sri Lankan speaking  ID #[id number]. Unable to LM as VM is not set up. Will call again another time  05/11/22 Reminder call - pt notified  6/13/22 lost to follow up    Hope Moyer RN, BSN

## 2022-09-10 ENCOUNTER — HEALTH MAINTENANCE LETTER (OUTPATIENT)
Age: 27
End: 2022-09-10

## 2023-01-22 ENCOUNTER — HEALTH MAINTENANCE LETTER (OUTPATIENT)
Age: 28
End: 2023-01-22

## 2023-05-04 ENCOUNTER — OFFICE VISIT (OUTPATIENT)
Dept: MIDWIFE SERVICES | Facility: CLINIC | Age: 28
End: 2023-05-04
Payer: COMMERCIAL

## 2023-05-04 ENCOUNTER — OFFICE VISIT (OUTPATIENT)
Dept: OBGYN | Facility: CLINIC | Age: 28
End: 2023-05-04
Payer: COMMERCIAL

## 2023-05-04 VITALS — WEIGHT: 236 LBS | DIASTOLIC BLOOD PRESSURE: 84 MMHG | BODY MASS INDEX: 36.96 KG/M2 | SYSTOLIC BLOOD PRESSURE: 122 MMHG

## 2023-05-04 DIAGNOSIS — F32.1 CURRENT MODERATE EPISODE OF MAJOR DEPRESSIVE DISORDER WITHOUT PRIOR EPISODE (H): Primary | ICD-10-CM

## 2023-05-04 DIAGNOSIS — F32.9 MAJOR DEPRESSIVE DISORDER WITH CURRENT ACTIVE EPISODE, UNSPECIFIED DEPRESSION EPISODE SEVERITY, UNSPECIFIED WHETHER RECURRENT: Primary | ICD-10-CM

## 2023-05-04 LAB
ERYTHROCYTE [DISTWIDTH] IN BLOOD BY AUTOMATED COUNT: 14.4 % (ref 10–15)
HCT VFR BLD AUTO: 40.3 % (ref 35–47)
HGB BLD-MCNC: 13.2 G/DL (ref 11.7–15.7)
MCH RBC QN AUTO: 26 PG (ref 26.5–33)
MCHC RBC AUTO-ENTMCNC: 32.8 G/DL (ref 31.5–36.5)
MCV RBC AUTO: 80 FL (ref 78–100)
PLATELET # BLD AUTO: 336 10E3/UL (ref 150–450)
RBC # BLD AUTO: 5.07 10E6/UL (ref 3.8–5.2)
TSH SERPL DL<=0.005 MIU/L-ACNC: 4.2 UIU/ML (ref 0.3–4.2)
WBC # BLD AUTO: 9.4 10E3/UL (ref 4–11)

## 2023-05-04 PROCEDURE — 36415 COLL VENOUS BLD VENIPUNCTURE: CPT | Performed by: ADVANCED PRACTICE MIDWIFE

## 2023-05-04 PROCEDURE — 85027 COMPLETE CBC AUTOMATED: CPT | Performed by: ADVANCED PRACTICE MIDWIFE

## 2023-05-04 PROCEDURE — 99214 OFFICE O/P EST MOD 30 MIN: CPT | Performed by: ADVANCED PRACTICE MIDWIFE

## 2023-05-04 PROCEDURE — 84443 ASSAY THYROID STIM HORMONE: CPT | Performed by: ADVANCED PRACTICE MIDWIFE

## 2023-05-04 PROCEDURE — 82306 VITAMIN D 25 HYDROXY: CPT | Performed by: ADVANCED PRACTICE MIDWIFE

## 2023-05-04 PROCEDURE — 99207 PR NO BILLABLE SERVICE THIS VISIT: CPT | Performed by: COUNSELOR

## 2023-05-04 PROCEDURE — 84439 ASSAY OF FREE THYROXINE: CPT | Performed by: ADVANCED PRACTICE MIDWIFE

## 2023-05-04 ASSESSMENT — PATIENT HEALTH QUESTIONNAIRE - PHQ9: SUM OF ALL RESPONSES TO PHQ QUESTIONS 1-9: 24

## 2023-05-04 NOTE — NURSING NOTE
"Chief Complaint   Patient presents with     Gyn Exam     Pap due,      Consult     C/o hairloss, insomnia and depression x2mo       Initial /84   Wt 107 kg (236 lb)   LMP 2023   BMI 36.96 kg/m   Estimated body mass index is 36.96 kg/m  as calculated from the following:    Height as of 10/1/21: 1.702 m (5' 7\").    Weight as of this encounter: 107 kg (236 lb).  BP completed using cuff size: regular    Questioned patient about current smoking habits.  Pt. has never smoked.          The following HM Due: pap smear      "

## 2023-05-04 NOTE — PROGRESS NOTES
Patient had appointment with her OBGYN care team. [facility] services were requested. No immediate safety/risk issues were reported or identified. Pt shared thoughts of why is she here but never thinks about ending her life. She knows her children need her and would never do anything to leave them.     Spoke with pt about her symptoms of anxiety, low mood, difficulty sleeping, and body tension that began 4 months ago. Pt shared fears when she is alone when she hears a noise that someone is trying to break into her home. She will often check outside to see if anyone is there. This fear has been happening for longer than 4 months.     Pt shared getting very little support from her . He leaves for work early in the morning and returns after 8pm and doesn't engage much with her or the children.     Explained the role of the [facility]. Pt declined [facility] support at this time as she is hopeful medication will make her feel better. [facility] explained the additional support and skills that may be helpful to pt. Pt shared she will consider making an appointment with [facility] if the medication doesn't take away all symptoms. [facility] provided information on how to make appointment.    Magy Zelaya [facility], Behavioral Health Clinician

## 2023-05-04 NOTE — PROGRESS NOTES
Patient is here today with an .   SUBJECTIVE:                                                   Marie Matos is a 28 year old who presents to clinic today for the following health issue(s):  Patient presents with:  Gyn Exam: Pap due,   Consult: C/o hairloss, insomnia and depression x2mo      HPI:  Marie presents today with significant depression issues. Is home alone with three small children. Does not have any friends or family support.  is at work all day and when he comes home he does not help with the children    Patient's last menstrual period was 04/23/2023.      Depression screen today: 24. Reports having some thoughts of self harm, but no plan.     Last PHQ-9 score on record =     5/16/2018     8:48 AM   PHQ-9 SCORE   PHQ-9 Total Score 1     Last GAD7 score on record =        View : No data to display.                  Problem list and histories reviewed & adjusted, as indicated.  Additional history: as documented.    Patient Active Problem List   Diagnosis     At risk for diabetes mellitus     BMI 32.0-32.9,adult     Abnormal Pap smear of cervix     Abnormal fetal ultrasound     Past Surgical History:   Procedure Laterality Date     DILATION AND CURETTAGE  2012    In [place] - Missed AB     DILATION AND CURETTAGE SUCTION WITH ULTRASOUND GUIDANCE N/A 4/6/2018    Procedure: DILATION AND CURETTAGE SUCTION WITH ULTRASOUND GUIDANCE;  Dilation and Curettage Suction with Ultrasound Guidance  ;  Surgeon: Celine Monahan MD;  Location:  OR      Social History     Tobacco Use     Smoking status: Never     Smokeless tobacco: Never   Vaping Use     Vaping status: Not on file   Substance Use Topics     Alcohol use: No      Problem (# of Occurrences) Relation (Name,Age of Onset)    Cancer (1) Paternal Grandmother    Diabetes (1) Paternal Grandfather (50)    Family History Negative (2) Mother, Father            Current Outpatient Medications   Medication Sig     sertraline (ZOLOFT) 50 MG tablet  Take 1 tablet (50 mg) by mouth daily     acetaminophen (TYLENOL) 325 MG tablet Take 2 tablets (650 mg) by mouth every 4 hours as needed for mild pain or fever (greater than or equal to 38  C /100.4  F (oral) or 38.5  C/ 101.4  F (core).) (Patient not taking: Reported on 11/22/2021)     benzocaine (AMERICAINE) 20 % external aerosol Use as directed on package (Patient not taking: Reported on 11/22/2021)     docusate sodium (COLACE) 100 MG capsule Take 1 capsule (100 mg) by mouth daily (Patient not taking: Reported on 11/22/2021)     ibuprofen (ADVIL/MOTRIN) 800 MG tablet Take 1 tablet (800 mg) by mouth every 6 hours as needed for other (cramping) (Patient not taking: Reported on 11/22/2021)     lanolin ointment Apply topically every hour as needed for other (sore nipples) (Patient not taking: Reported on 11/22/2021)     Prenatal Vit-Fe Fumarate-FA (PRENATAL MULTIVITAMIN PLUS IRON) 27-0.8 MG TABS per tablet Take 1 tablet by mouth daily (Patient not taking: Reported on 5/4/2023)     No current facility-administered medications for this visit.     No Known Allergies    ROS:  CONSTITUTIONAL: NEGATIVE for fever, chills, change in weight  PSYCHIATRIC: POSITIVE foranxiety, depressed mood, fatigue, hopelessness, insomnia  and obsessive thoughts    OBJECTIVE:     /84   Wt 107 kg (236 lb)   LMP 04/23/2023   BMI 36.96 kg/m    Body mass index is 36.96 kg/m .    PHYSICAL EXAM:  Constitutional:  Appearance: Well nourished, well developed alert, in no moderate distress  Chest:  Respiratory Effort:  Breathing unlabored.   Neurologic:  Mental Status:  Oriented X3.  Normal strength and tone, sensory exam grossly normal, mentation intact and speech normal.    Psychiatric:  Mentation appears normal and affect patient tearful     In-Clinic Test Results:  No results found for this or any previous visit (from the past 24 hour(s)).    ASSESSMENT/PLAN:                                                        ICD-10-CM    1. Current  moderate episode of major depressive disorder without prior episode (H)  F32.1 TSH with free T4 reflex     CBC with platelets     Vitamin D Deficiency     sertraline (ZOLOFT) 50 MG tablet          PLAN:    Discussed options for treatment, including counseling, medications, or both. Stressed that combining medication and counseling would prove the most helpful. Facilitated meeting with counselor today, but patient ultimately decided not to schedule appointment for therapy. Labs pending. Started patient on Zoloft, 50mg/day. Will see patient back for follow up in two weeks. Instructed patient to call if symptoms became worse in interim.  Encouraged patient to call with any questions or concerns.     SADIE Ackerman, CNM

## 2023-05-05 LAB — T4 FREE SERPL-MCNC: 1.25 NG/DL (ref 0.9–1.7)

## 2023-05-08 ENCOUNTER — TELEPHONE (OUTPATIENT)
Dept: MIDWIFE SERVICES | Facility: CLINIC | Age: 28
End: 2023-05-08
Payer: COMMERCIAL

## 2023-05-08 DIAGNOSIS — E55.9 VITAMIN D DEFICIENCY: Primary | ICD-10-CM

## 2023-05-08 LAB — DEPRECATED CALCIDIOL+CALCIFEROL SERPL-MC: 16 UG/L (ref 20–75)

## 2023-05-08 RX ORDER — ERGOCALCIFEROL 1.25 MG/1
50000 CAPSULE, LIQUID FILLED ORAL WEEKLY
Qty: 8 CAPSULE | Refills: 0 | Status: SHIPPED | OUTPATIENT
Start: 2023-05-08

## 2023-05-24 ENCOUNTER — TELEPHONE (OUTPATIENT)
Dept: OBGYN | Facility: CLINIC | Age: 28
End: 2023-05-24

## 2023-05-24 NOTE — TELEPHONE ENCOUNTER
[facility] attempted to outreach to pt to discuss missed appointment and overall wellbeing.  utilized. Phone just rang, no voicemail. Attempted x2.    Ruby Chandra CNM informed of this.    JOELLEN Carrasquillo, [facility]  Behavioral Health Clinician  [facility]

## 2023-07-21 ENCOUNTER — ANCILLARY PROCEDURE (OUTPATIENT)
Dept: ULTRASOUND IMAGING | Facility: CLINIC | Age: 28
End: 2023-07-21
Payer: COMMERCIAL

## 2023-07-21 ENCOUNTER — OFFICE VISIT (OUTPATIENT)
Dept: OBGYN | Facility: CLINIC | Age: 28
End: 2023-07-21
Payer: MEDICAID

## 2023-07-21 ENCOUNTER — TELEPHONE (OUTPATIENT)
Dept: NURSING | Facility: CLINIC | Age: 28
End: 2023-07-21

## 2023-07-21 VITALS
SYSTOLIC BLOOD PRESSURE: 119 MMHG | HEART RATE: 107 BPM | BODY MASS INDEX: 37.9 KG/M2 | DIASTOLIC BLOOD PRESSURE: 68 MMHG | WEIGHT: 242 LBS | OXYGEN SATURATION: 100 %

## 2023-07-21 DIAGNOSIS — Z33.2 TERMINATION OF PREGNANCY (FETUS): ICD-10-CM

## 2023-07-21 DIAGNOSIS — Z33.2 TERMINATION OF PREGNANCY (FETUS): Primary | ICD-10-CM

## 2023-07-21 PROCEDURE — S0190 MIFEPRISTONE, ORAL, 200 MG: HCPCS

## 2023-07-21 PROCEDURE — 76817 TRANSVAGINAL US OBSTETRIC: CPT | Performed by: OBSTETRICS & GYNECOLOGY

## 2023-07-21 PROCEDURE — 76801 OB US < 14 WKS SINGLE FETUS: CPT | Performed by: OBSTETRICS & GYNECOLOGY

## 2023-07-21 PROCEDURE — 99213 OFFICE O/P EST LOW 20 MIN: CPT

## 2023-07-21 RX ORDER — LOPERAMIDE HYDROCHLORIDE 2 MG/1
2 TABLET ORAL 4 TIMES DAILY PRN
Qty: 10 TABLET | Refills: 0 | Status: SHIPPED | OUTPATIENT
Start: 2023-07-21 | End: 2023-11-09

## 2023-07-21 RX ORDER — LOPERAMIDE HYDROCHLORIDE 2 MG/1
2 TABLET ORAL 4 TIMES DAILY PRN
Qty: 10 TABLET | Refills: 0 | Status: SHIPPED | OUTPATIENT
Start: 2023-07-21 | End: 2023-07-21

## 2023-07-21 RX ORDER — MISOPROSTOL 200 UG/1
800 TABLET ORAL ONCE
Qty: 4 TABLET | Refills: 1 | Status: SHIPPED | OUTPATIENT
Start: 2023-07-21 | End: 2023-07-21

## 2023-07-21 RX ORDER — MIFEPRISTONE 200 MG/1
200 TABLET ORAL ONCE
Status: DISCONTINUED | OUTPATIENT
Start: 2023-07-21 | End: 2023-07-21

## 2023-07-21 RX ORDER — ONDANSETRON 4 MG/1
4 TABLET, FILM COATED ORAL EVERY 6 HOURS PRN
Qty: 10 TABLET | Refills: 0 | Status: SHIPPED | OUTPATIENT
Start: 2023-07-21 | End: 2023-11-09

## 2023-07-21 RX ORDER — MIFEPRISTONE 200 MG/1
200 TABLET ORAL ONCE
Status: COMPLETED | OUTPATIENT
Start: 2023-07-21 | End: 2023-07-21

## 2023-07-21 RX ORDER — ONDANSETRON 4 MG/1
4 TABLET, FILM COATED ORAL EVERY 6 HOURS PRN
Qty: 10 TABLET | Refills: 0 | Status: SHIPPED | OUTPATIENT
Start: 2023-07-21 | End: 2023-07-21

## 2023-07-21 RX ADMIN — MIFEPRISTONE 200 MG: 200 TABLET ORAL at 15:22

## 2023-07-21 NOTE — PATIENT INSTRUCTIONS
MEDICATION TERMINATION OF PREGNANCY USING MIFEPRISTONE AND MISOPROSTOL    HOW DOES IT WORK?    Mifepristone and misoprostol together are medications that can be taken to end your pregnancy.      HOW WELL DOES THE MEDICATION WORK?    Medication  is highly effective. Medication  is highly effective; it has a success rate of >95% using the standard protocol. Medication  is safe. Serious complications requiring hospitalization for infection treatment or transfusion occur in <0.4% of patients under the standard protocol?. Failed or incomplete medication  may require a suction procedure to complete.    WHAT DO I DO?    1. You took one tablet of 200 mg mifepristone today during your visit or will take it at home as directed by your provider.   2. After taking the mifepristone, use the misoprostol.  There are two ways to take misoprostol: vaginal or buccal (between cheek and gum in your mouth).   a. Vaginal Use of Misoprostol (may be inserted immediately or up to 48 hrs after mifepristone)   i. Wash your hands and lie down. Place the 4 misoprostol tablets one at a time up into the vagina as far as you can using your finger. It doesn t matter where in the vagina you put the pills. Each misoprostol pill contains 200 micrograms.    ii. If your provider has recommended a second dose of misoprostol, repeat the process 4 hours later with an additional 4 tablets of misoprostol. If you have started to bleed, you can put the pills in your cheeks (between your cheek and your gums), instead of your vagina, for 30 minutes. See  Buccal Use of Misoprostol  below.    b. Buccal Use of Misoprostol (should be taken between 24-48 hrs after mifepristone)   i. Place 2 tablets of misoprostol in each cheek (between your cheek and your gums), four tablets total.    ii. Leave them in your cheeks for 30 minutes to dissolve.    iii. After 30 minutes swallow the pills with a large glass of water.   iv. If your  provider has recommended a second dose of misoprostol, repeat steps i. through iii. above after 4 hours.    3. You may take ibuprofen (800 mg total) and/or acetaminophen (650mg) by mouth one hour before using the misoprostol. This may help with cramping. See further information on pain management below.       WHAT HAPPENS NEXT?   ? Vaginal bleeding with clots is an expected part of this process. The cramps and bleeding may be stronger than your normal period. The cramps and heavy bleeding usually start 2 to 4 hours after you use the misoprostol pills. This heavy bleeding means the pills are working. After the pregnancy tissue passes, the bleeding will slow down and get lighter and lighter. It is normal to pass small clots and sometimes the bleeding may seem to increase when you get up suddenly or go to the toilet. Bleeding may continue on and off for up to 4 weeks.     ? Lower abdominal cramping pain, especially in the middle of your lower abdomen can occur any time after you take the misoprostol.? Cramping may be similar or sometimes much greater than with a menstrual period and can last for a couple of days. If you continue to have pain and cramps after taking the ibuprofen (as directed above), then you can use additional pain medicine such as acetaminophen (Tylenol).?   ? Nausea, diarrhea: These symptoms should get better a few hours after using the pills and should not last longer than 24 hours.    ? Fever and chills: You may have fever and chills the day you take the misoprostol. It is NOT normal to have a fever after that. Call us right away if you do. It could be a sign that you are getting an infection.   ? You will receive a phone call from a clinic nurse within a week of your visit.    ? You can expect a period in 4 to 8 weeks after using mifepristone and misoprostol but this varies quite a bit depending upon a person s normal menstrual cycle.      WHAT CAN I TAKE FOR PAIN, NAUSEA, DIARRHEA?    ? Pain:     ? Take ibuprofen (Motrin or Advil) 800 mg every 8 hours as needed for pain. Take this with food to avoid an upset stomach. You may also alternate this with acetaminophen (Tylenol) 650mg every 6 hours for added pain control.   ? Comfort: A hot pack may help with cramps. You can also drink some hot tea. Rest in a soothing place.    ? Nausea   ? Take ondansetron or promethazine as needed for nausea and vomiting as needed for nausea and vomiting if prescribed.   ? Diarrhea   ? Take Loperamide as needed for diarrhea. Take 2 capsules after the first loose bowel movement. Can take 1 capsule after each additional loose stool. Do not take more than 8 capsules in a day.     WHEN SHOULD I CALL MY HEALTHCARE PROVIDER?    ? Your bleeding soaks through more than 2 maxi pads per hour for 2 hours in a row   ? You do not bleed within 24 hours after taking the misoprostol   ? You continue to feel sick more than 24 hours after taking the misoprostol:   ? Fever on an oral thermometer of 100.4 degrees Fahrenheit, severe stomach area (abdominal) pain, weakness, nausea, vomiting, or diarrhea     The clinic phone is answered 24 hours per day. If it is after clinic hours, leave a message with the answering service and a health care provider will call you back. Please call if you have any questions, think something is going wrong, or think you have an emergency. If you do not receive a call back within an hour, go to the nearest emergency room.              FEELINGS AFTER ENDING PREGNANCY    People have many different feelings after using the medications to end a pregnancy. The most common emotion people report is relief, but people can also feel many other emotions. If you think your emotions are not what they should be, please talk to us.        References:   1. American College of Obstetricians and Gynecologists  Committee on Practice Bulletins--Gynecology, Society of Family Planning. Medication  Up to 70 Days of Gestation: ACOG  Practice Bulletin, Number 225. Obstet Gynecol. 2020;136(4):e31-e47. doi: 10.1097/AOG.[id number].    2. Reproductive Health Access Project. Medication  Aftercare Instructions (vaginal miso). May 2022. Available at: https://www.reproductiveaccess.org/wp-content/uploads/[email or web address]-final.[email or web address]   3. Reproductive Health Access Project. Medication  Aftercare Instructions (buccal miso). May 2022. Available at: https://www.reproductiveaccess.org/wp-content/uploads/-english-buccal-med-ab-aftercare_final.pdf

## 2023-07-21 NOTE — TELEPHONE ENCOUNTER
Pt has US scheduled because last period was only 1 day long when she normally has 4 days long.  Pt scheduled for Follow-up with provider.

## 2023-07-21 NOTE — PROGRESS NOTES
2023     RN intake completed.  Pre medication ultrasound completed 23    Subjective:  Marie Matos is a 28 year old  at Unknown LMP here today for medication .  Patient has been counseled on pregnancy options and desires medical termination of pregnancy.       Objective:  Dating: early ultrasound on 23 dates pregnancy 5-6 weeks. Reviewed ultrasound with Dr. Vasquez. Ultrasound revelas early gestational sac with yolk sac confirming IUP, final ultrasound report pending.  No LMP recorded. LMP unknown      OB History    Para Term  AB Living   4 3 3 0 1 3   SAB IAB Ectopic Multiple Live Births   1 0 0 0 3      # Outcome Date GA Lbr Humphrey/2nd Weight Sex Delivery Anes PTL Lv   4 Term 10/10/21 38w1d 05:00 / 00:05 4.01 kg (8 lb 13.5 oz) M Vag-Spont None  STEPHANIE      Name: KAMMALE-MARIE      Apgar1: 8  Apgar5: 9   3 Term 18 39w4d 02:50 / 00:02 3.93 kg (8 lb 10.6 oz) F Vag-Spont None N STEPHANIE      Name: Netta      Apgar1: 8  Apgar5: 9   2 Term 01/21/15 39w6d 07:35 / 00:27 4.394 kg (9 lb 11 oz) M Vag-Spont None  STEPHANIE      Birth Comments: Breast fed and bottle fed with breast milk      Name: Renny      Apgar1: 9  Apgar5: 9   1 SAB 03/10/12     AB, MISSED           Past Medical History:   Diagnosis Date     Abnormal Pap smear of cervix      Miscarriage      Two vessel cord 2018       Past Surgical History:   Procedure Laterality Date     DILATION AND CURETTAGE      In [place] - Missed AB     DILATION AND CURETTAGE SUCTION WITH ULTRASOUND GUIDANCE N/A 2018    Procedure: DILATION AND CURETTAGE SUCTION WITH ULTRASOUND GUIDANCE;  Dilation and Curettage Suction with Ultrasound Guidance  ;  Surgeon: Celine Monahan MD;  Location:  OR       Current Outpatient Medications   Medication     loperamide (IMODIUM A-D) 2 MG tablet     misoprostol (CYTOTEC) 200 MCG tablet     ondansetron (ZOFRAN) 4 MG tablet     sertraline (ZOLOFT) 50 MG tablet     vitamin D2  "(ERGOCALCIFEROL) 77071 units (1250 mcg) capsule     acetaminophen (TYLENOL) 325 MG tablet     benzocaine (AMERICAINE) 20 % external aerosol     docusate sodium (COLACE) 100 MG capsule     ibuprofen (ADVIL/MOTRIN) 800 MG tablet     lanolin ointment     Prenatal Vit-Fe Fumarate-FA (PRENATAL MULTIVITAMIN PLUS IRON) 27-0.8 MG TABS per tablet     Current Facility-Administered Medications   Medication     miFEPRIStone (MIFEPREX) tablet 200 mg       No Known Allergies    Social History     Tobacco Use     Smoking status: Never     Smokeless tobacco: Never   Substance Use Topics     Alcohol use: No     Drug use: No        Vitals:    23 1435   BP: 119/68   Pulse: 107   SpO2: 100%   Weight: 109.8 kg (242 lb)       Estimated body mass index is 37.9 kg/m  as calculated from the following:    Height as of 10/1/21: 1.702 m (5' 7\").    Weight as of this encounter: 109.8 kg (242 lb).     Gen: well-appearing, cooperative, well-groomed      Assessment and Plan:  Marie Matos is a 28 year old  at 5.5 wks as dated by Previous US who desires termination of pregnancy with medication .     Based the pre-medication  ultrasound assessment, ultrasound prior to  IS indicated.     The patient is appropriate for medication  with:  mifepristone and misoprostol.     Prior to the administration/prescribing of mifepristone, the patient received the medication guide and signed the patient agreement.      Mifepristone administered in clinic by this prescriber 23 1522. Patient plans to take misoprostol by buccal route.     .    Counseled patient on   - expected bleeding: Bleeding typically starts 2-4 hours after misoprostol and can be heavy for up to 2 hours. Once pregnancy tissue passes, bleeding should slow down to menstrual type flow but can last up to 2 weeks. Patient counseled to contact our clinic or present to Emergency Department in the case of heavy bleeding (soaking more than two maxi pads " per hour for 2 consecutive hours).  - pain: Counseled patient that the most severe pain occurs approximately 2-4 hours after misoprostol use and lasts 1-2 hours. Advised patient to take ibuprofen 800 mg with misoprostol and repeat as needed every 8 hours. Patient may also alternate with acetaminophen for added pain control (acetaminophen 650 mg every 6 hours).   - potential side effects of misoprostol: nausea, vomiting, diarrhea, headache, dizziness, and thermoregulatory effects such as fever, warmth, hot flushes, or chills  - follow up plan options:     Home Pregnancy Test - Take a urine pregnancy test four weeks after taking  medication(s).    Blood Test - Get a blood test on the day of your appointment or the day you take your first dose of medication to measure the amount of pregnancy hormone (HCG) in your blood.  Get another HCG blood test 1-2 weeks after taking  medication(s).     Ultrasound - Get a vaginal ultrasound 1-2 weeks after taking  medication(s).  Patient plans a home pregnancy test  for follow up.  Also offered to discuss any contraceptive needs/plans with the patient today. Patient plans unsure, will follow up with what pt desires for contraception.    Chart routed to RN team who will follow up with patient via telephone within 1 week after clinic visit to assess patient.    Review of the result(s) of each unique test - ultrasound  Independent interpretation of a test performed by another physician/other qualified health care professional (not separately reported) - review of U/S with Dr. Pedro Robles, SADIE CNP

## 2023-07-21 NOTE — TELEPHONE ENCOUNTER
Indication of US needed before TOP can be done.    Pre-Medication  Ultrasound Assessement  Marie Matos     When was your positive pregnancy test?  Date _____23_______ if >52 days ago needs US      Were you using hormonal birth control, an IUD or emergency contraception when you got pregnant? No If YES needs US      Have you ever been diagnosed with an ectopic pregnancy? NoIf YES needs US      Have you ever had a tubal ligation or sterilization procedure? NoIf YES needs US      Have you ever been diagnosed with pelvic inflammatory disease? No If YES needs US      Are you having one sided pelvic pain? No If YES needs US      How long are your typical menstural cycles? (how many days from the start of one period to the start of the next one)  During the past 3 months, has the type between periods varied from your typical cycles by more than a few days? If cycle irregular needs US      When was the first day of your last period? No LMP recorded. if LMP unknown needs US. If LMP>70d (>10w) needs US      Are you sure of that date? NO If NO needs US      Did your last period start earlier or later than you would have expected? No If YES needs US      Was your last period shorter than usual? YESIf YES needs US      Was the amount of bleeding in your last period normal for you? NO If NO needs US      Was the amount of cramping during your last period normal for you? NO if NO needs US      Have you had any other recent bleeding that was not normal for you? No If YES needs US      Kat Saini, RN

## 2023-07-24 ENCOUNTER — TELEPHONE (OUTPATIENT)
Dept: NURSING | Facility: CLINIC | Age: 28
End: 2023-07-24

## 2023-07-24 NOTE — TELEPHONE ENCOUNTER
----- Message from SADIE Whitmore CNP sent at 7/21/2023  4:00 PM CDT -----  Regarding: f/u for TOP  Hi Triage,  Please follow up with this patient she was seen in clinic on 7/21 for TOP. She prefers an at home upt at 4 weeks, and has not decided on any birth control, so if you could ask her if she wants something ordered we can definitely do that for her as well.  Thank you,  Paty

## 2023-07-24 NOTE — TELEPHONE ENCOUNTER
Medication     1-2 days Post-Misoprostol phone assessment by RN      Marieelaine Matos is 28 year old who had mifepristone administered in clinic on  by SADIE Avilez CNP.    Marie Matos took Misoprostol on  at 6AM/PM: AM.    AMOUNT  Describe the bleeding that you are having  bleeding with a  lot of clots    [SPOTTING]: spotting, or pinkish/brownish mucous discharge; does not fill panty-liner or pad    [MILD]: less than 1 pad/hour    [MODERATE]: 1-2 pads/hour, small-medium blood clots (e.g. pea, grape, small coin)    [SEVERE] soaking 2 or more pads/hour for 2 or more hours; bleeding not contained by pads or continuous red blood from vagina; large blood clots (e.g. golf ball, large coin) Send to ED    FEVER: No. NONE   ANY SIGNS of INFECTION:   discharge      HOW ARE YOU MANAGING EMOTIONALLY?     DO HAVE SUPPORT AT HOME? Yes. If NO, reinforce resource given at previous appointment.    THOUGHTS OF SELF-HARM/HARMING OTHERS: No. If YES, send to ED    RESOURCES FOR COPING/GRIEF REINFORCED    Marie Matos TOLD THAT THIS ASSESSMENT WILL BE SENT TO PHYSICIAN TO FINALIZE FOLLOW-UP PLAN.    SENT TO Dr. Paty Rader - Pt scheduled for lab 23 FOR FOLLOW-UP PLAN    TOLD Marie Deutsch WE WOULD BE CALLING BACK ONCE WE HEAR BACK    Marie Matos ENCOURAGED TO CALL BACK TO TRIAGE FOR ANY NEEDS

## 2023-07-31 DIAGNOSIS — Z33.2 TERMINATION OF PREGNANCY (FETUS): Primary | ICD-10-CM

## 2023-11-09 ENCOUNTER — OFFICE VISIT (OUTPATIENT)
Dept: FAMILY MEDICINE | Facility: CLINIC | Age: 28
End: 2023-11-09
Payer: COMMERCIAL

## 2023-11-09 VITALS
WEIGHT: 246 LBS | BODY MASS INDEX: 39.53 KG/M2 | TEMPERATURE: 98.7 F | OXYGEN SATURATION: 98 % | SYSTOLIC BLOOD PRESSURE: 132 MMHG | DIASTOLIC BLOOD PRESSURE: 80 MMHG | HEART RATE: 67 BPM | HEIGHT: 66 IN | RESPIRATION RATE: 16 BRPM

## 2023-11-09 DIAGNOSIS — R11.2 NAUSEA AND VOMITING, UNSPECIFIED VOMITING TYPE: Primary | ICD-10-CM

## 2023-11-09 DIAGNOSIS — R79.89 ABNORMAL TSH: ICD-10-CM

## 2023-11-09 DIAGNOSIS — R79.89 LOW VITAMIN D LEVEL: ICD-10-CM

## 2023-11-09 LAB
HBA1C MFR BLD: 5.6 % (ref 0–5.6)
T4 FREE SERPL-MCNC: 1.1 NG/DL (ref 0.9–1.7)
TSH SERPL DL<=0.005 MIU/L-ACNC: 4.6 UIU/ML (ref 0.3–4.2)
VIT D+METAB SERPL-MCNC: 17 NG/ML (ref 20–50)

## 2023-11-09 PROCEDURE — 99203 OFFICE O/P NEW LOW 30 MIN: CPT | Performed by: PHYSICIAN ASSISTANT

## 2023-11-09 PROCEDURE — 84439 ASSAY OF FREE THYROXINE: CPT | Performed by: PHYSICIAN ASSISTANT

## 2023-11-09 PROCEDURE — 83036 HEMOGLOBIN GLYCOSYLATED A1C: CPT | Performed by: PHYSICIAN ASSISTANT

## 2023-11-09 PROCEDURE — 36415 COLL VENOUS BLD VENIPUNCTURE: CPT | Performed by: PHYSICIAN ASSISTANT

## 2023-11-09 PROCEDURE — 82306 VITAMIN D 25 HYDROXY: CPT | Performed by: PHYSICIAN ASSISTANT

## 2023-11-09 PROCEDURE — 84443 ASSAY THYROID STIM HORMONE: CPT | Performed by: PHYSICIAN ASSISTANT

## 2023-11-09 ASSESSMENT — ANXIETY QUESTIONNAIRES
2. NOT BEING ABLE TO STOP OR CONTROL WORRYING: SEVERAL DAYS
GAD7 TOTAL SCORE: 10
GAD7 TOTAL SCORE: 10
7. FEELING AFRAID AS IF SOMETHING AWFUL MIGHT HAPPEN: MORE THAN HALF THE DAYS
5. BEING SO RESTLESS THAT IT IS HARD TO SIT STILL: NOT AT ALL
6. BECOMING EASILY ANNOYED OR IRRITABLE: MORE THAN HALF THE DAYS
1. FEELING NERVOUS, ANXIOUS, OR ON EDGE: SEVERAL DAYS
IF YOU CHECKED OFF ANY PROBLEMS ON THIS QUESTIONNAIRE, HOW DIFFICULT HAVE THESE PROBLEMS MADE IT FOR YOU TO DO YOUR WORK, TAKE CARE OF THINGS AT HOME, OR GET ALONG WITH OTHER PEOPLE: SOMEWHAT DIFFICULT
4. TROUBLE RELAXING: MORE THAN HALF THE DAYS
3. WORRYING TOO MUCH ABOUT DIFFERENT THINGS: MORE THAN HALF THE DAYS

## 2023-11-09 ASSESSMENT — PATIENT HEALTH QUESTIONNAIRE - PHQ9
SUM OF ALL RESPONSES TO PHQ QUESTIONS 1-9: 12
10. IF YOU CHECKED OFF ANY PROBLEMS, HOW DIFFICULT HAVE THESE PROBLEMS MADE IT FOR YOU TO DO YOUR WORK, TAKE CARE OF THINGS AT HOME, OR GET ALONG WITH OTHER PEOPLE: NOT DIFFICULT AT ALL
SUM OF ALL RESPONSES TO PHQ QUESTIONS 1-9: 12

## 2023-11-09 NOTE — PROGRESS NOTES
"  Assessment & Plan     Nausea and vomiting, unspecified vomiting type  Will check A1c due to significant family history.  Advised omeprazole 20 mg in am 30 minutes before eating.  Follow-up in 1-2 months if not resolving   - Hemoglobin A1c  - omeprazole (PRILOSEC) 20 MG DR capsule; Take 1 capsule (20 mg) by mouth daily    Low vitamin D level  Will recheck.  Took derrick D for 1 month this spring .  Recommended 2000 units of D3 daily   - Vitamin D Deficiency    Abnormal TSH  Will recheck   - TSH with free T4 reflex    [id number]}     BMI:   Estimated body mass index is 39.71 kg/m  as calculated from the following:    Height as of this encounter: 1.676 m (5' 6\").    Weight as of this encounter: 111.6 kg (246 lb).   Weight management plan: Discussed healthy diet and exercise guidelines    Depression Screening Follow Up        11/9/2023    11:19 AM   PHQ   PHQ-9 Total Score 12   Q9: Thoughts of better off dead/self-harm past 2 weeks Not at all         Follow Up Actions Taken  Crisis resource information provided in After Visit Summary         Ramona Ann Aaseby-Aguilera, PA-C [facility] JACKIE Salazar is a 28 year old, presenting for the following health issues:  Abdominal Pain (LLQ abdominal pain after food intake x1 month)      11/9/2023    11:26 AM   Additional Questions   Roomed by Cindy Brody   Accompanied by spouse and children     1)Headaches daily:  has glasses but doesn't wear them. headaches low grade and takes nsaids and helps.    2) Abdominal pain and nausea in am and after eating.     Has a family history of diabetes and vitamin D was very low when checked this spring . Also thyroid  tsh was slightly elevated but T4 was within normal limits      History of Present Illness       Headaches:   Since the patient's last clinic visit, headaches are: no change  The patient is getting headaches:  Every day  She is not able to do normal daily activities when she has a migraine.  The " "patient is taking the following rescue/relief medications:  Ibuprofen (Advil, Motrin)   Patient states \"I get only a small amount of relief\" from the rescue/relief medications.   The patient is taking the following medications to prevent migraines:  No medications to prevent migraines  In the past 4 weeks, the patient has gone to an Urgent Care or Emergency Room 0 times times due to headaches.    Reason for visit:  Stomach hurts after meals and naussea  Symptom onset:  3-4 weeks ago  Symptom intensity:  Moderate  Symptom progression:  Staying the same  Had these symptoms before:  No  What makes it worse:  No  What makes it better:  No    She eats 0-1 servings of fruits and vegetables daily.She consumes 2 sweetened beverage(s) daily.She exercises with enough effort to increase her heart rate 9 or less minutes per day.  She exercises with enough effort to increase her heart rate 3 or less days per week.   She is taking medications regularly.             Review of Systems   Constitutional, HEENT, cardiovascular, pulmonary, gi and gu systems are negative, except as otherwise noted.      Objective    /80 (BP Location: Right arm, Patient Position: Chair, Cuff Size: Adult Large)   Pulse 67   Temp 98.7  F (37.1  C) (Oral)   Resp 16   Ht 1.676 m (5' 6\")   Wt 111.6 kg (246 lb)   LMP 10/13/2023   SpO2 98%   Breastfeeding No   BMI 39.71 kg/m    Body mass index is 39.71 kg/m .  Physical Exam   GENERAL: healthy, alert and no distress  EYES: Eyes grossly normal to inspection, PERRL and conjunctivae and sclerae normal  HENT: ear canals and TM's normal, nose and mouth without ulcers or lesions  NECK: no adenopathy, no asymmetry, masses, or scars and thyroid normal to palpation  RESP: lungs clear to auscultation - no rales, rhonchi or wheezes  CV: regular rate and rhythm, normal S1 S2, no S3 or S4, no murmur, click or rub, no peripheral edema and peripheral pulses strong  ABDOMEN: soft, nontender, no " hepatosplenomegaly, no masses and bowel sounds normal

## 2024-01-24 ENCOUNTER — OFFICE VISIT (OUTPATIENT)
Dept: OBGYN | Facility: CLINIC | Age: 29
End: 2024-01-24
Payer: COMMERCIAL

## 2024-01-24 VITALS — BODY MASS INDEX: 39.54 KG/M2 | DIASTOLIC BLOOD PRESSURE: 68 MMHG | SYSTOLIC BLOOD PRESSURE: 106 MMHG | WEIGHT: 245 LBS

## 2024-01-24 DIAGNOSIS — Z12.4 SCREENING FOR MALIGNANT NEOPLASM OF CERVIX: Primary | ICD-10-CM

## 2024-01-24 PROCEDURE — 87624 HPV HI-RISK TYP POOLED RSLT: CPT | Performed by: OBSTETRICS & GYNECOLOGY

## 2024-01-24 PROCEDURE — 99213 OFFICE O/P EST LOW 20 MIN: CPT | Performed by: OBSTETRICS & GYNECOLOGY

## 2024-01-24 PROCEDURE — G0145 SCR C/V CYTO,THINLAYER,RESCR: HCPCS | Performed by: OBSTETRICS & GYNECOLOGY

## 2024-01-24 NOTE — NURSING NOTE
"Chief Complaint   Patient presents with    Gyn Exam   Due for pap  Here with spouse interpreting    initial /68   Wt 111.1 kg (245 lb)   LMP 01/06/2024 (Exact Date)   BMI 39.54 kg/m   Estimated body mass index is 39.54 kg/m  as calculated from the following:    Height as of 11/9/23: 1.676 m (5' 6\").    Weight as of this encounter: 111.1 kg (245 lb).  BP completed using cuff size large.  Radha Ribeiro CMA    "

## 2024-01-24 NOTE — PROGRESS NOTES
"Chief Complaint   Patient presents with    Gyn Exam       Subjective:  28-year-old para 3  female presents for a pelvic exam and Pap smear.  Her  is present and is interpreting.  She voices no gynecologic concerns or complaints.  Her last Pap was in April 2021 and was within normal limits.  She has not had HPV testing in conjunction with her prior Paps but will today and this was explained.    She and her  are using condoms for contraception and declines other forms.  Of note she had an elective termination of pregnancy with medication in July 2023.    She endorses predictable monthly menstrual cycles with occasional cramps.    She has noticed some \"white spots\" on her breasts.  She denies any lumps or unusual breast discharge    REVIEW OF SYSTEMS:  General: negative    Health Maintenance   Topic Date Due    ADVANCE CARE PLANNING  Never done    DEPRESSION ACTION PLAN  Never done    HEPATITIS B IMMUNIZATION (1 of 3 - 3-dose series) Never done    HEPATITIS C SCREENING  Never done    YEARLY PREVENTIVE VISIT  11/02/2016    PAP FOLLOW-UP  04/23/2022    INFLUENZA VACCINE (1) 09/01/2023    COVID-19 Vaccine (3 - 2023-24 season) 09/01/2023    PHQ-9  05/09/2024    DTAP/TDAP/TD IMMUNIZATION (4 - Td or Tdap) 09/17/2031    HIV SCREENING  Completed    Pneumococcal Vaccine: Pediatrics (0 to 5 Years) and At-Risk Patients (6 to 64 Years)  Aged Out    IPV IMMUNIZATION  Aged Out    HPV IMMUNIZATION  Aged Out    MENINGITIS IMMUNIZATION  Aged Out    RSV MONOCLONAL ANTIBODY  Aged Out    CHLAMYDIA SCREENING  Discontinued    PAP  Discontinued       No Known Allergies    Objective:  Vitals: /68   Wt 111.1 kg (245 lb)   LMP 01/06/2024 (Exact Date)   BMI 39.54 kg/m    BMI= Body mass index is 39.54 kg/m .    The breasts are pendulous, large and symmetric.  The white spots that she referred to her areas of decreased pigmentation on the right breast and the areola.  These areas are consistent with alopecia.  There " is no mass or discharge from the breasts    External genitalia without lesions.  The vagina is negative throughout its course.  The cervix has a multiparous appearance without lesions or active bleeding.  A Pap smear is obtained.  On bimanual examination the uterus is midline mobile and not appreciably enlarged although the exam is somewhat limited by the patient's body habitus.  No adnexal masses or tenderness are appreciated    Assessment/Plan:  1. by the [facility] normal annual gynecologic examination and breast exam.  Pap obtained.  Results will be conveyed to the patient by the [facility].    Return to clinic 1 year or as needed for contraception or any other gynecologic issues    Cristino Monzon MD   16

## 2024-01-29 LAB
BKR LAB AP GYN ADEQUACY: NORMAL
BKR LAB AP GYN INTERPRETATION: NORMAL
BKR LAB AP HPV REFLEX: NORMAL
BKR LAB AP LMP: NORMAL
BKR LAB AP PREVIOUS ABNORMAL: NORMAL
PATH REPORT.COMMENTS IMP SPEC: NORMAL
PATH REPORT.COMMENTS IMP SPEC: NORMAL
PATH REPORT.RELEVANT HX SPEC: NORMAL

## 2024-02-02 LAB
HUMAN PAPILLOMA VIRUS 16 DNA: NEGATIVE
HUMAN PAPILLOMA VIRUS 18 DNA: NEGATIVE
HUMAN PAPILLOMA VIRUS FINAL DIAGNOSIS: NORMAL
HUMAN PAPILLOMA VIRUS OTHER HR: NEGATIVE

## 2024-02-18 ENCOUNTER — HEALTH MAINTENANCE LETTER (OUTPATIENT)
Age: 29
End: 2024-02-18

## 2024-02-26 ENCOUNTER — TELEPHONE (OUTPATIENT)
Dept: FAMILY MEDICINE | Facility: CLINIC | Age: 29
End: 2024-02-26
Payer: COMMERCIAL

## 2024-02-26 NOTE — TELEPHONE ENCOUNTER
Writer was asked by GUILHERME Zamorano to reach out to patient for further triage, offer virtual or Urgent Care. No answer and no VM.  Will send My chart message to patient.

## 2024-07-26 ENCOUNTER — ALLIED HEALTH/NURSE VISIT (OUTPATIENT)
Dept: FAMILY MEDICINE | Facility: CLINIC | Age: 29
End: 2024-07-26
Payer: COMMERCIAL

## 2024-07-26 VITALS
RESPIRATION RATE: 18 BRPM | DIASTOLIC BLOOD PRESSURE: 68 MMHG | SYSTOLIC BLOOD PRESSURE: 108 MMHG | HEART RATE: 88 BPM | OXYGEN SATURATION: 93 %

## 2024-07-26 DIAGNOSIS — Z53.9 DIAGNOSIS FOR ++++ WALK IN CLINIC ++++: Primary | ICD-10-CM

## 2024-07-26 PROCEDURE — 99207 PR NO CHARGE NURSE ONLY: CPT | Performed by: FAMILY MEDICINE

## 2024-07-26 NOTE — PROGRESS NOTES
S-(situation): patient comes in with  and her aunt. Her father  in [place] yesterday. Patient is upset crying, but not hysterical. They are wanting her vital signs checked, as she is going to be flying to [place] for the .    B-(background): Father  yesterday. Family has given her Valium they had,  showed me a box, unknown amount.     A-(assessment): /68 (BP Location: Right arm, Patient Position: Chair, Cuff Size: Adult Large)   Pulse 88   Resp 18   SpO2 93%   Patient appears to be stable, is grieving appropriately in this writers opinion.   Patient and family are informed that they are welcome to wait for Urgent Care to open to be assessed by a doctor.      R-(recommendations): Wait for Urgent Care to open to be assessed by a Provider.  Patient and family declined. They are going to bring the patient home and will call if they need anything else.

## 2024-09-27 NOTE — PROGRESS NOTES
NPN nurse visit done over the phone. Pt will be given NPN folder and book at her upcoming appt.   Discussed optional screening available to assess chromosomal anomalies. Questions answered. Pt advised to call the clinic if she has any questions or concerns related to her pregnancy. Prenatal labs will be obtained at her upcoming appt. New prenatal visit scheduled on 4/23/21 with Comfort Phillip CNM.    [id number]    Lab Results   Component Value Date    PAP NIL 09/15/2017           Patient supplied answers from flow sheet for:  Prenatal OB Questionnaire.                                   negative normal/regular rate and rhythm/S1 S2 present/no gallops/no rub/no murmur normal/regular rate and rhythm

## 2025-03-09 ENCOUNTER — HEALTH MAINTENANCE LETTER (OUTPATIENT)
Age: 30
End: 2025-03-09

## (undated) DEVICE — PAD CHUX UNDERPAD 30X36" P3036C

## (undated) DEVICE — SUCTION CANNULA UTERINE 12MM CVD  21555

## (undated) DEVICE — SOL NACL 0.9% IRRIG 1000ML BOTTLE 07138-09

## (undated) DEVICE — GLOVE PROTEXIS MICRO 6.5  2D73PM65

## (undated) DEVICE — TUBING VACUUM COLLECTION 6FT 23116

## (undated) DEVICE — CATH INTERMITTENT CLEAN-CATH FEMALE 14FR 6" VINYL LF 420614

## (undated) DEVICE — LINEN FULL SHEET 5511

## (undated) DEVICE — BAG CLEAR TRASH 1.3M 39X33" P4040C

## (undated) DEVICE — PACK MINOR LITHOTOMY RIDGES

## (undated) DEVICE — LINEN HALF SHEET 5512

## (undated) DEVICE — GLOVE PROTEXIS BLUE W/NEU-THERA 7.0  2D73EB70

## (undated) DEVICE — FILTER BERKLEY SAFETOUCH

## (undated) RX ORDER — ONDANSETRON 2 MG/ML
INJECTION INTRAMUSCULAR; INTRAVENOUS
Status: DISPENSED
Start: 2018-04-06

## (undated) RX ORDER — ACETAMINOPHEN 325 MG/1
TABLET ORAL
Status: DISPENSED
Start: 2018-04-06

## (undated) RX ORDER — LIDOCAINE HYDROCHLORIDE 10 MG/ML
INJECTION, SOLUTION EPIDURAL; INFILTRATION; INTRACAUDAL; PERINEURAL
Status: DISPENSED
Start: 2018-04-06

## (undated) RX ORDER — CEFAZOLIN SODIUM 2 G/100ML
INJECTION, SOLUTION INTRAVENOUS
Status: DISPENSED
Start: 2018-04-06

## (undated) RX ORDER — DEXAMETHASONE SODIUM PHOSPHATE 4 MG/ML
INJECTION, SOLUTION INTRA-ARTICULAR; INTRALESIONAL; INTRAMUSCULAR; INTRAVENOUS; SOFT TISSUE
Status: DISPENSED
Start: 2018-04-06

## (undated) RX ORDER — FENTANYL CITRATE 50 UG/ML
INJECTION, SOLUTION INTRAMUSCULAR; INTRAVENOUS
Status: DISPENSED
Start: 2018-04-06

## (undated) RX ORDER — PROPOFOL 10 MG/ML
INJECTION, EMULSION INTRAVENOUS
Status: DISPENSED
Start: 2018-04-06